# Patient Record
Sex: FEMALE | Race: WHITE | NOT HISPANIC OR LATINO
[De-identification: names, ages, dates, MRNs, and addresses within clinical notes are randomized per-mention and may not be internally consistent; named-entity substitution may affect disease eponyms.]

---

## 2017-03-12 ENCOUNTER — RX RENEWAL (OUTPATIENT)
Age: 72
End: 2017-03-12

## 2017-03-25 ENCOUNTER — RX RENEWAL (OUTPATIENT)
Age: 72
End: 2017-03-25

## 2017-03-28 ENCOUNTER — MEDICATION RENEWAL (OUTPATIENT)
Age: 72
End: 2017-03-28

## 2017-04-04 ENCOUNTER — NON-APPOINTMENT (OUTPATIENT)
Age: 72
End: 2017-04-04

## 2017-04-04 ENCOUNTER — APPOINTMENT (OUTPATIENT)
Dept: CARDIOLOGY | Facility: CLINIC | Age: 72
End: 2017-04-04

## 2017-04-04 VITALS
HEIGHT: 64 IN | SYSTOLIC BLOOD PRESSURE: 130 MMHG | DIASTOLIC BLOOD PRESSURE: 74 MMHG | OXYGEN SATURATION: 96 % | HEART RATE: 76 BPM

## 2017-04-05 ENCOUNTER — RESULT REVIEW (OUTPATIENT)
Age: 72
End: 2017-04-05

## 2017-04-05 LAB
ALBUMIN SERPL ELPH-MCNC: 4.7 G/DL
ALP BLD-CCNC: 91 U/L
ALT SERPL-CCNC: 28 U/L
ANION GAP SERPL CALC-SCNC: 22 MMOL/L
AST SERPL-CCNC: 23 U/L
BASOPHILS # BLD AUTO: 0.03 K/UL
BASOPHILS NFR BLD AUTO: 0.5 %
BILIRUB SERPL-MCNC: 0.3 MG/DL
BUN SERPL-MCNC: 19 MG/DL
CALCIUM SERPL-MCNC: 10.1 MG/DL
CHLORIDE SERPL-SCNC: 106 MMOL/L
CHOLEST SERPL-MCNC: 256 MG/DL
CHOLEST/HDLC SERPL: 5.2 RATIO
CO2 SERPL-SCNC: 20 MMOL/L
CREAT SERPL-MCNC: 0.99 MG/DL
EOSINOPHIL # BLD AUTO: 0.28 K/UL
EOSINOPHIL NFR BLD AUTO: 4.6 %
GLUCOSE SERPL-MCNC: 130 MG/DL
HBA1C MFR BLD HPLC: 6.6 %
HCT VFR BLD CALC: 41.1 %
HDLC SERPL-MCNC: 49 MG/DL
HGB BLD-MCNC: 13.3 G/DL
IMM GRANULOCYTES NFR BLD AUTO: 0.2 %
LDLC SERPL CALC-MCNC: 161 MG/DL
LYMPHOCYTES # BLD AUTO: 1.78 K/UL
LYMPHOCYTES NFR BLD AUTO: 28.9 %
MAN DIFF?: NORMAL
MCHC RBC-ENTMCNC: 30 PG
MCHC RBC-ENTMCNC: 32.4 GM/DL
MCV RBC AUTO: 92.8 FL
MONOCYTES # BLD AUTO: 0.28 K/UL
MONOCYTES NFR BLD AUTO: 4.6 %
NEUTROPHILS # BLD AUTO: 3.77 K/UL
NEUTROPHILS NFR BLD AUTO: 61.2 %
PLATELET # BLD AUTO: 307 K/UL
POTASSIUM SERPL-SCNC: 5.2 MMOL/L
PROT SERPL-MCNC: 7.4 G/DL
RBC # BLD: 4.43 M/UL
RBC # FLD: 13.2 %
SODIUM SERPL-SCNC: 148 MMOL/L
T4 SERPL-MCNC: 10.4 UG/DL
TRIGL SERPL-MCNC: 231 MG/DL
TSH SERPL-ACNC: 2.07 UIU/ML
WBC # FLD AUTO: 6.15 K/UL

## 2017-07-27 LAB
ALBUMIN SERPL ELPH-MCNC: 4.5 G/DL
ALP BLD-CCNC: 86 U/L
ALT SERPL-CCNC: 19 U/L
ANION GAP SERPL CALC-SCNC: 18 MMOL/L
AST SERPL-CCNC: 17 U/L
BASOPHILS # BLD AUTO: 0.04 K/UL
BASOPHILS NFR BLD AUTO: 0.6 %
BILIRUB SERPL-MCNC: 0.2 MG/DL
BUN SERPL-MCNC: 21 MG/DL
CALCIUM SERPL-MCNC: 9.7 MG/DL
CHLORIDE SERPL-SCNC: 107 MMOL/L
CHOLEST SERPL-MCNC: 226 MG/DL
CHOLEST/HDLC SERPL: 5 RATIO
CO2 SERPL-SCNC: 22 MMOL/L
CREAT SERPL-MCNC: 1.04 MG/DL
EOSINOPHIL # BLD AUTO: 0.28 K/UL
EOSINOPHIL NFR BLD AUTO: 4.4 %
GLUCOSE SERPL-MCNC: 127 MG/DL
HBA1C MFR BLD HPLC: 6.6 %
HCT VFR BLD CALC: 41.5 %
HDLC SERPL-MCNC: 45 MG/DL
HGB BLD-MCNC: 13.8 G/DL
IMM GRANULOCYTES NFR BLD AUTO: 0 %
LDLC SERPL CALC-MCNC: 141 MG/DL
LYMPHOCYTES # BLD AUTO: 2.58 K/UL
LYMPHOCYTES NFR BLD AUTO: 40.8 %
MAN DIFF?: NORMAL
MCHC RBC-ENTMCNC: 30.1 PG
MCHC RBC-ENTMCNC: 33.3 GM/DL
MCV RBC AUTO: 90.6 FL
MONOCYTES # BLD AUTO: 0.36 K/UL
MONOCYTES NFR BLD AUTO: 5.7 %
NEUTROPHILS # BLD AUTO: 3.06 K/UL
NEUTROPHILS NFR BLD AUTO: 48.5 %
PLATELET # BLD AUTO: 264 K/UL
POTASSIUM SERPL-SCNC: 4.8 MMOL/L
PROT SERPL-MCNC: 7.1 G/DL
RBC # BLD: 4.58 M/UL
RBC # FLD: 13.2 %
SODIUM SERPL-SCNC: 147 MMOL/L
T4 SERPL-MCNC: 8.4 UG/DL
TRIGL SERPL-MCNC: 198 MG/DL
TSH SERPL-ACNC: 4.18 UIU/ML
WBC # FLD AUTO: 6.32 K/UL

## 2017-08-02 ENCOUNTER — APPOINTMENT (OUTPATIENT)
Dept: CARDIOLOGY | Facility: CLINIC | Age: 72
End: 2017-08-02
Payer: MEDICARE

## 2017-08-02 ENCOUNTER — NON-APPOINTMENT (OUTPATIENT)
Age: 72
End: 2017-08-02

## 2017-08-02 VITALS
SYSTOLIC BLOOD PRESSURE: 147 MMHG | OXYGEN SATURATION: 98 % | DIASTOLIC BLOOD PRESSURE: 85 MMHG | HEART RATE: 74 BPM | HEIGHT: 64 IN

## 2017-08-02 PROCEDURE — 93000 ELECTROCARDIOGRAM COMPLETE: CPT

## 2017-08-02 PROCEDURE — 99214 OFFICE O/P EST MOD 30 MIN: CPT

## 2017-11-29 ENCOUNTER — LABORATORY RESULT (OUTPATIENT)
Age: 72
End: 2017-11-29

## 2017-11-30 LAB
25(OH)D3 SERPL-MCNC: 25.8 NG/ML
ALBUMIN SERPL ELPH-MCNC: 4.8 G/DL
ALP BLD-CCNC: 86 U/L
ALT SERPL-CCNC: 22 U/L
ANION GAP SERPL CALC-SCNC: 16 MMOL/L
AST SERPL-CCNC: 21 U/L
BASOPHILS # BLD AUTO: 0.05 K/UL
BASOPHILS NFR BLD AUTO: 0.7 %
BILIRUB SERPL-MCNC: 0.3 MG/DL
BUN SERPL-MCNC: 24 MG/DL
CALCIUM SERPL-MCNC: 10.2 MG/DL
CHLORIDE SERPL-SCNC: 103 MMOL/L
CHOLEST SERPL-MCNC: 264 MG/DL
CHOLEST/HDLC SERPL: 4.8 RATIO
CO2 SERPL-SCNC: 23 MMOL/L
CREAT SERPL-MCNC: 1.04 MG/DL
EOSINOPHIL # BLD AUTO: 0.2 K/UL
EOSINOPHIL NFR BLD AUTO: 2.8 %
GLUCOSE SERPL-MCNC: 134 MG/DL
HBA1C MFR BLD HPLC: 6.9 %
HCT VFR BLD CALC: 43.4 %
HDLC SERPL-MCNC: 55 MG/DL
HGB BLD-MCNC: 14.2 G/DL
IMM GRANULOCYTES NFR BLD AUTO: 0.1 %
LDLC SERPL CALC-MCNC: 180 MG/DL
LYMPHOCYTES # BLD AUTO: 2.04 K/UL
LYMPHOCYTES NFR BLD AUTO: 28.5 %
MAN DIFF?: NORMAL
MCHC RBC-ENTMCNC: 31.1 PG
MCHC RBC-ENTMCNC: 32.7 GM/DL
MCV RBC AUTO: 95.2 FL
MONOCYTES # BLD AUTO: 0.34 K/UL
MONOCYTES NFR BLD AUTO: 4.7 %
NEUTROPHILS # BLD AUTO: 4.52 K/UL
NEUTROPHILS NFR BLD AUTO: 63.2 %
PLATELET # BLD AUTO: 277 K/UL
POTASSIUM SERPL-SCNC: 5.1 MMOL/L
PROT SERPL-MCNC: 7.5 G/DL
RBC # BLD: 4.56 M/UL
RBC # FLD: 13.7 %
SODIUM SERPL-SCNC: 142 MMOL/L
T3RU NFR SERPL: 1.04 INDEX
TRIGL SERPL-MCNC: 145 MG/DL
WBC # FLD AUTO: 7.16 K/UL

## 2017-12-06 ENCOUNTER — NON-APPOINTMENT (OUTPATIENT)
Age: 72
End: 2017-12-06

## 2017-12-06 ENCOUNTER — APPOINTMENT (OUTPATIENT)
Dept: CARDIOLOGY | Facility: CLINIC | Age: 72
End: 2017-12-06
Payer: MEDICARE

## 2017-12-06 VITALS
SYSTOLIC BLOOD PRESSURE: 159 MMHG | HEART RATE: 78 BPM | DIASTOLIC BLOOD PRESSURE: 88 MMHG | OXYGEN SATURATION: 98 % | HEIGHT: 64 IN

## 2017-12-06 VITALS — DIASTOLIC BLOOD PRESSURE: 76 MMHG | SYSTOLIC BLOOD PRESSURE: 160 MMHG

## 2017-12-06 DIAGNOSIS — R73.09 OTHER ABNORMAL GLUCOSE: ICD-10-CM

## 2017-12-06 PROCEDURE — 99214 OFFICE O/P EST MOD 30 MIN: CPT

## 2017-12-06 PROCEDURE — 93000 ELECTROCARDIOGRAM COMPLETE: CPT

## 2017-12-22 ENCOUNTER — RX RENEWAL (OUTPATIENT)
Age: 72
End: 2017-12-22

## 2018-01-25 ENCOUNTER — RX RENEWAL (OUTPATIENT)
Age: 73
End: 2018-01-25

## 2018-04-04 ENCOUNTER — NON-APPOINTMENT (OUTPATIENT)
Age: 73
End: 2018-04-04

## 2018-04-04 ENCOUNTER — LABORATORY RESULT (OUTPATIENT)
Age: 73
End: 2018-04-04

## 2018-04-04 ENCOUNTER — APPOINTMENT (OUTPATIENT)
Dept: CARDIOLOGY | Facility: CLINIC | Age: 73
End: 2018-04-04
Payer: MEDICARE

## 2018-04-04 VITALS
OXYGEN SATURATION: 99 % | HEART RATE: 87 BPM | WEIGHT: 184 LBS | DIASTOLIC BLOOD PRESSURE: 75 MMHG | TEMPERATURE: 98.3 F | SYSTOLIC BLOOD PRESSURE: 137 MMHG | BODY MASS INDEX: 31.41 KG/M2 | HEIGHT: 64 IN

## 2018-04-04 PROCEDURE — 99214 OFFICE O/P EST MOD 30 MIN: CPT

## 2018-04-04 PROCEDURE — 93000 ELECTROCARDIOGRAM COMPLETE: CPT

## 2018-04-05 LAB
ALBUMIN SERPL ELPH-MCNC: 4.4 G/DL
ALP BLD-CCNC: 80 U/L
ALT SERPL-CCNC: 22 U/L
ANION GAP SERPL CALC-SCNC: 14 MMOL/L
AST SERPL-CCNC: 17 U/L
BASOPHILS # BLD AUTO: 0.04 K/UL
BASOPHILS NFR BLD AUTO: 0.6 %
BILIRUB SERPL-MCNC: 0.3 MG/DL
BUN SERPL-MCNC: 17 MG/DL
CALCIUM SERPL-MCNC: 9.8 MG/DL
CHLORIDE SERPL-SCNC: 103 MMOL/L
CHOLEST SERPL-MCNC: 223 MG/DL
CHOLEST/HDLC SERPL: 5.9 RATIO
CO2 SERPL-SCNC: 24 MMOL/L
CREAT SERPL-MCNC: 1.09 MG/DL
EOSINOPHIL # BLD AUTO: 0.29 K/UL
EOSINOPHIL NFR BLD AUTO: 4.4 %
GLUCOSE SERPL-MCNC: 142 MG/DL
HBA1C MFR BLD HPLC: 7.5 %
HCT VFR BLD CALC: 39.6 %
HDLC SERPL-MCNC: 38 MG/DL
HGB BLD-MCNC: 13.6 G/DL
IMM GRANULOCYTES NFR BLD AUTO: 0 %
LDLC SERPL CALC-MCNC: 131 MG/DL
LYMPHOCYTES # BLD AUTO: 2.59 K/UL
LYMPHOCYTES NFR BLD AUTO: 39 %
MAN DIFF?: NORMAL
MCHC RBC-ENTMCNC: 31.5 PG
MCHC RBC-ENTMCNC: 34.3 GM/DL
MCV RBC AUTO: 91.7 FL
MONOCYTES # BLD AUTO: 0.49 K/UL
MONOCYTES NFR BLD AUTO: 7.4 %
NEUTROPHILS # BLD AUTO: 3.22 K/UL
NEUTROPHILS NFR BLD AUTO: 48.4 %
PLATELET # BLD AUTO: 283 K/UL
POTASSIUM SERPL-SCNC: 4.5 MMOL/L
PROT SERPL-MCNC: 7 G/DL
RBC # BLD: 4.32 M/UL
RBC # FLD: 13 %
SODIUM SERPL-SCNC: 141 MMOL/L
T3 SERPL-MCNC: 102 NG/DL
T3RU NFR SERPL: 1.05 INDEX
T4 SERPL-MCNC: 9.7 UG/DL
TRIGL SERPL-MCNC: 269 MG/DL
TSH SERPL-ACNC: 4.47 UIU/ML
WBC # FLD AUTO: 6.64 K/UL

## 2018-05-31 ENCOUNTER — MEDICATION RENEWAL (OUTPATIENT)
Age: 73
End: 2018-05-31

## 2018-05-31 ENCOUNTER — APPOINTMENT (OUTPATIENT)
Dept: CARDIOLOGY | Facility: CLINIC | Age: 73
End: 2018-05-31

## 2018-05-31 RX ORDER — PANTOPRAZOLE 40 MG/1
40 TABLET, DELAYED RELEASE ORAL
Qty: 90 | Refills: 0 | Status: ACTIVE | COMMUNITY
Start: 2017-09-25

## 2018-06-13 ENCOUNTER — APPOINTMENT (OUTPATIENT)
Dept: CARDIOLOGY | Facility: CLINIC | Age: 73
End: 2018-06-13
Payer: MEDICARE

## 2018-06-13 VITALS
OXYGEN SATURATION: 97 % | SYSTOLIC BLOOD PRESSURE: 140 MMHG | HEIGHT: 64 IN | HEART RATE: 78 BPM | BODY MASS INDEX: 30.73 KG/M2 | DIASTOLIC BLOOD PRESSURE: 80 MMHG | WEIGHT: 180 LBS

## 2018-06-13 DIAGNOSIS — M48.062 SPINAL STENOSIS, LUMBAR REGION WITH NEUROGENIC CLAUDICATION: ICD-10-CM

## 2018-06-13 PROCEDURE — 99214 OFFICE O/P EST MOD 30 MIN: CPT

## 2018-06-24 ENCOUNTER — RX RENEWAL (OUTPATIENT)
Age: 73
End: 2018-06-24

## 2018-08-01 ENCOUNTER — NON-APPOINTMENT (OUTPATIENT)
Age: 73
End: 2018-08-01

## 2018-08-01 ENCOUNTER — APPOINTMENT (OUTPATIENT)
Dept: CARDIOLOGY | Facility: CLINIC | Age: 73
End: 2018-08-01
Payer: MEDICARE

## 2018-08-01 VITALS — SYSTOLIC BLOOD PRESSURE: 148 MMHG | DIASTOLIC BLOOD PRESSURE: 76 MMHG

## 2018-08-01 VITALS
WEIGHT: 178 LBS | OXYGEN SATURATION: 98 % | SYSTOLIC BLOOD PRESSURE: 157 MMHG | HEART RATE: 91 BPM | HEIGHT: 64 IN | BODY MASS INDEX: 30.39 KG/M2 | DIASTOLIC BLOOD PRESSURE: 74 MMHG

## 2018-08-01 DIAGNOSIS — M51.36 OTHER INTERVERTEBRAL DISC DEGENERATION, LUMBAR REGION: ICD-10-CM

## 2018-08-01 LAB
ALBUMIN SERPL ELPH-MCNC: 4.3 G/DL
ALP BLD-CCNC: 67 U/L
ALT SERPL-CCNC: 19 U/L
ANION GAP SERPL CALC-SCNC: 15 MMOL/L
AST SERPL-CCNC: 22 U/L
BASOPHILS # BLD AUTO: 0.02 K/UL
BASOPHILS NFR BLD AUTO: 0.4 %
BILIRUB SERPL-MCNC: 0.2 MG/DL
BUN SERPL-MCNC: 15 MG/DL
CALCIUM SERPL-MCNC: 9.3 MG/DL
CHLORIDE SERPL-SCNC: 105 MMOL/L
CHOLEST SERPL-MCNC: 215 MG/DL
CHOLEST/HDLC SERPL: 6.1 RATIO
CO2 SERPL-SCNC: 23 MMOL/L
CREAT SERPL-MCNC: 0.87 MG/DL
EOSINOPHIL # BLD AUTO: 0.2 K/UL
EOSINOPHIL NFR BLD AUTO: 4.1 %
ESTIMATED AVERAGE GLUCOSE: 148 MG/DL
GLUCOSE SERPL-MCNC: 144 MG/DL
HBA1C MFR BLD HPLC: 6.8 %
HCT VFR BLD CALC: 40.7 %
HDLC SERPL-MCNC: 35 MG/DL
HGB BLD-MCNC: 13.2 G/DL
IMM GRANULOCYTES NFR BLD AUTO: 0.2 %
LDLC SERPL CALC-MCNC: 150 MG/DL
LYMPHOCYTES # BLD AUTO: 1.7 K/UL
LYMPHOCYTES NFR BLD AUTO: 34.5 %
MAN DIFF?: NORMAL
MCHC RBC-ENTMCNC: 30.3 PG
MCHC RBC-ENTMCNC: 32.4 GM/DL
MCV RBC AUTO: 93.3 FL
MONOCYTES # BLD AUTO: 0.38 K/UL
MONOCYTES NFR BLD AUTO: 7.7 %
NEUTROPHILS # BLD AUTO: 2.62 K/UL
NEUTROPHILS NFR BLD AUTO: 53.1 %
PLATELET # BLD AUTO: 249 K/UL
POTASSIUM SERPL-SCNC: 4.3 MMOL/L
PROT SERPL-MCNC: 6.9 G/DL
RBC # BLD: 4.36 M/UL
RBC # FLD: 13.2 %
SODIUM SERPL-SCNC: 143 MMOL/L
T4 SERPL-MCNC: 8.9 UG/DL
TRIGL SERPL-MCNC: 150 MG/DL
TSH SERPL-ACNC: 2.8 UIU/ML
WBC # FLD AUTO: 4.93 K/UL

## 2018-08-01 PROCEDURE — 99214 OFFICE O/P EST MOD 30 MIN: CPT

## 2018-08-01 PROCEDURE — 93000 ELECTROCARDIOGRAM COMPLETE: CPT

## 2018-11-09 ENCOUNTER — MEDICATION RENEWAL (OUTPATIENT)
Age: 73
End: 2018-11-09

## 2018-11-22 LAB
ALBUMIN SERPL ELPH-MCNC: 4.9 G/DL
ALP BLD-CCNC: 83 U/L
ALT SERPL-CCNC: 29 U/L
ANION GAP SERPL CALC-SCNC: 12 MMOL/L
AST SERPL-CCNC: 21 U/L
BASOPHILS # BLD AUTO: 0.04 K/UL
BASOPHILS NFR BLD AUTO: 0.7 %
BILIRUB SERPL-MCNC: 0.4 MG/DL
BUN SERPL-MCNC: 14 MG/DL
CALCIUM SERPL-MCNC: 9.9 MG/DL
CHLORIDE SERPL-SCNC: 102 MMOL/L
CHOLEST SERPL-MCNC: 212 MG/DL
CHOLEST/HDLC SERPL: 5 RATIO
CO2 SERPL-SCNC: 26 MMOL/L
CREAT SERPL-MCNC: 1.03 MG/DL
EOSINOPHIL # BLD AUTO: 0.29 K/UL
EOSINOPHIL NFR BLD AUTO: 4.9 %
GLUCOSE SERPL-MCNC: 169 MG/DL
HBA1C MFR BLD HPLC: 7.2 %
HCT VFR BLD CALC: 43 %
HDLC SERPL-MCNC: 42 MG/DL
HGB BLD-MCNC: 13.9 G/DL
IMM GRANULOCYTES NFR BLD AUTO: 0.2 %
LDLC SERPL CALC-MCNC: 129 MG/DL
LYMPHOCYTES # BLD AUTO: 1.74 K/UL
LYMPHOCYTES NFR BLD AUTO: 29.4 %
MAN DIFF?: NORMAL
MCHC RBC-ENTMCNC: 30.1 PG
MCHC RBC-ENTMCNC: 32.3 GM/DL
MCV RBC AUTO: 93.1 FL
MONOCYTES # BLD AUTO: 0.43 K/UL
MONOCYTES NFR BLD AUTO: 7.3 %
NEUTROPHILS # BLD AUTO: 3.41 K/UL
NEUTROPHILS NFR BLD AUTO: 57.5 %
PLATELET # BLD AUTO: 301 K/UL
POTASSIUM SERPL-SCNC: 4.2 MMOL/L
PROT SERPL-MCNC: 7.3 G/DL
RBC # BLD: 4.62 M/UL
RBC # FLD: 13 %
SODIUM SERPL-SCNC: 140 MMOL/L
T3 SERPL-MCNC: 115 NG/DL
T4 FREE SERPL-MCNC: 1.6 NG/DL
T4 SERPL-MCNC: 10.3 UG/DL
TRIGL SERPL-MCNC: 206 MG/DL
TSH SERPL-ACNC: 2.66 UIU/ML
WBC # FLD AUTO: 5.92 K/UL

## 2018-11-26 ENCOUNTER — NON-APPOINTMENT (OUTPATIENT)
Age: 73
End: 2018-11-26

## 2018-11-26 ENCOUNTER — APPOINTMENT (OUTPATIENT)
Dept: CARDIOLOGY | Facility: CLINIC | Age: 73
End: 2018-11-26
Payer: MEDICARE

## 2018-11-26 VITALS
DIASTOLIC BLOOD PRESSURE: 62 MMHG | BODY MASS INDEX: 30.22 KG/M2 | SYSTOLIC BLOOD PRESSURE: 143 MMHG | HEART RATE: 82 BPM | HEIGHT: 64 IN | OXYGEN SATURATION: 99 % | WEIGHT: 177 LBS

## 2018-11-26 PROCEDURE — 99214 OFFICE O/P EST MOD 30 MIN: CPT

## 2018-11-26 PROCEDURE — 93000 ELECTROCARDIOGRAM COMPLETE: CPT

## 2018-11-26 RX ORDER — ACETAMINOPHEN AND CODEINE 300; 30 MG/1; MG/1
300-30 TABLET ORAL
Qty: 8 | Refills: 0 | Status: DISCONTINUED | COMMUNITY
Start: 2018-05-15 | End: 2018-11-26

## 2018-11-26 NOTE — REASON FOR VISIT
[FreeTextEntry1] : The patient comes in for followup of her hypertension, hyperlipidemia, hypothyroidism, and prediabetes. She denies any chest pains, shortness of breath, or palpitations. She is complaining of right-sided lower back pain and some right sided flank pain that seems to wrap around from the back. It is relieved by Advil which she takes sparingly.She went to a neurologist who did an MRI and found out that most of her pains are from sciatica. She has some pains in the foot which may be the sciatica or maybe because she had surgery done on that foot.

## 2018-11-26 NOTE — DISCUSSION/SUMMARY
[FreeTextEntry1] : The patient was examined. Her blood pressure was 143/62, and her pulse was 82..Her lungs were clear to auscultation. Cardiac exam was negative for murmurs rubs or gallops.  There was no cyanosis,clubbing or edema.  Her EKG showed normal sinus rhythm, poor R-wave progression, and nonspecific ST and T wave changes. No acute changes were seen.She was still continue to take Advil as needed for the pain. She will continue her other medication. She will return in 4 months, or earlier if needed.

## 2018-11-26 NOTE — PHYSICAL EXAM
[General Appearance - Well Developed] : well developed [Normal Appearance] : normal appearance [Well Groomed] : well groomed [General Appearance - Well Nourished] : well nourished [No Deformities] : no deformities [General Appearance - In No Acute Distress] : no acute distress [Normal Conjunctiva] : the conjunctiva exhibited no abnormalities [Eyelids - No Xanthelasma] : the eyelids demonstrated no xanthelasmas [Normal Oral Mucosa] : normal oral mucosa [No Oral Pallor] : no oral pallor [No Oral Cyanosis] : no oral cyanosis [Normal Jugular Venous A Waves Present] : normal jugular venous A waves present [Normal Jugular Venous V Waves Present] : normal jugular venous V waves present [No Jugular Venous Cabrera A Waves] : no jugular venous cabrera A waves [Respiration, Rhythm And Depth] : normal respiratory rhythm and effort [Exaggerated Use Of Accessory Muscles For Inspiration] : no accessory muscle use [Auscultation Breath Sounds / Voice Sounds] : lungs were clear to auscultation bilaterally [Heart Rate And Rhythm] : heart rate and rhythm were normal [Heart Sounds] : normal S1 and S2 [Murmurs] : no murmurs present [Abdomen Soft] : soft [Abdomen Tenderness] : non-tender [Abdomen Mass (___ Cm)] : no abdominal mass palpated [Abnormal Walk] : normal gait [Gait - Sufficient For Exercise Testing] : the gait was sufficient for exercise testing [Nail Clubbing] : no clubbing of the fingernails [Cyanosis, Localized] : no localized cyanosis [Petechial Hemorrhages (___cm)] : no petechial hemorrhages [Skin Color & Pigmentation] : normal skin color and pigmentation [] : no rash [No Venous Stasis] : no venous stasis [Skin Lesions] : no skin lesions [No Skin Ulcers] : no skin ulcer [No Xanthoma] : no  xanthoma was observed [Oriented To Time, Place, And Person] : oriented to person, place, and time [Affect] : the affect was normal [Mood] : the mood was normal [No Anxiety] : not feeling anxious

## 2018-11-26 NOTE — REVIEW OF SYSTEMS
[see HPI] : see HPI [Negative] : Heme/Lymph [Feeling Fatigued] : not feeling fatigued [Blurry Vision] : no blurred vision [Shortness Of Breath] : no shortness of breath [Chest Pain] : no chest pain [Palpitations] : no palpitations [Cough] : no cough

## 2019-01-01 ENCOUNTER — APPOINTMENT (OUTPATIENT)
Dept: CARDIOLOGY | Facility: CLINIC | Age: 74
End: 2019-01-01
Payer: MEDICARE

## 2019-01-01 ENCOUNTER — OTHER (OUTPATIENT)
Age: 74
End: 2019-01-01

## 2019-01-01 ENCOUNTER — NON-APPOINTMENT (OUTPATIENT)
Age: 74
End: 2019-01-01

## 2019-01-01 ENCOUNTER — RX RENEWAL (OUTPATIENT)
Age: 74
End: 2019-01-01

## 2019-01-01 VITALS
OXYGEN SATURATION: 99 % | HEART RATE: 72 BPM | DIASTOLIC BLOOD PRESSURE: 84 MMHG | SYSTOLIC BLOOD PRESSURE: 132 MMHG | HEIGHT: 64 IN

## 2019-01-01 VITALS
SYSTOLIC BLOOD PRESSURE: 150 MMHG | WEIGHT: 181 LBS | HEART RATE: 76 BPM | DIASTOLIC BLOOD PRESSURE: 80 MMHG | BODY MASS INDEX: 30.9 KG/M2 | OXYGEN SATURATION: 97 % | HEIGHT: 64 IN

## 2019-01-01 DIAGNOSIS — M54.5 LOW BACK PAIN: ICD-10-CM

## 2019-01-01 DIAGNOSIS — R21 RASH AND OTHER NONSPECIFIC SKIN ERUPTION: ICD-10-CM

## 2019-01-01 DIAGNOSIS — Z86.69 PERSONAL HISTORY OF OTHER DISEASES OF THE NERVOUS SYSTEM AND SENSE ORGANS: ICD-10-CM

## 2019-01-01 LAB
ALBUMIN SERPL ELPH-MCNC: 4.6 G/DL
ALBUMIN SERPL ELPH-MCNC: 4.6 G/DL
ALP BLD-CCNC: 80 U/L
ALP BLD-CCNC: 95 U/L
ALT SERPL-CCNC: 20 U/L
ALT SERPL-CCNC: 24 U/L
ANION GAP SERPL CALC-SCNC: 12 MMOL/L
ANION GAP SERPL CALC-SCNC: 13 MMOL/L
AST SERPL-CCNC: 13 U/L
AST SERPL-CCNC: 17 U/L
BASOPHILS # BLD AUTO: 0.04 K/UL
BASOPHILS # BLD AUTO: 0.06 K/UL
BASOPHILS NFR BLD AUTO: 0.8 %
BASOPHILS NFR BLD AUTO: 1.1 %
BILIRUB SERPL-MCNC: 0.2 MG/DL
BILIRUB SERPL-MCNC: 0.3 MG/DL
BUN SERPL-MCNC: 15 MG/DL
BUN SERPL-MCNC: 16 MG/DL
CALCIUM SERPL-MCNC: 9.6 MG/DL
CALCIUM SERPL-MCNC: 9.9 MG/DL
CHLORIDE SERPL-SCNC: 102 MMOL/L
CHLORIDE SERPL-SCNC: 105 MMOL/L
CHOLEST SERPL-MCNC: 225 MG/DL
CHOLEST SERPL-MCNC: 305 MG/DL
CHOLEST/HDLC SERPL: 5.8 RATIO
CHOLEST/HDLC SERPL: 8.2 RATIO
CO2 SERPL-SCNC: 25 MMOL/L
CO2 SERPL-SCNC: 27 MMOL/L
CREAT SERPL-MCNC: 0.86 MG/DL
CREAT SERPL-MCNC: 0.89 MG/DL
EOSINOPHIL # BLD AUTO: 0.21 K/UL
EOSINOPHIL # BLD AUTO: 0.25 K/UL
EOSINOPHIL NFR BLD AUTO: 4 %
EOSINOPHIL NFR BLD AUTO: 4.4 %
ESTIMATED AVERAGE GLUCOSE: 163 MG/DL
ESTIMATED AVERAGE GLUCOSE: 214 MG/DL
GLUCOSE SERPL-MCNC: 152 MG/DL
GLUCOSE SERPL-MCNC: 219 MG/DL
HBA1C MFR BLD HPLC: 7.3 %
HBA1C MFR BLD HPLC: 9.1 %
HCT VFR BLD CALC: 42 %
HCT VFR BLD CALC: 42.1 %
HDLC SERPL-MCNC: 37 MG/DL
HDLC SERPL-MCNC: 39 MG/DL
HGB BLD-MCNC: 13.4 G/DL
HGB BLD-MCNC: 13.6 G/DL
IMM GRANULOCYTES NFR BLD AUTO: 0.2 %
IMM GRANULOCYTES NFR BLD AUTO: 0.2 %
LDLC SERPL CALC-MCNC: 141 MG/DL
LDLC SERPL CALC-MCNC: 213 MG/DL
LYMPHOCYTES # BLD AUTO: 1.62 K/UL
LYMPHOCYTES # BLD AUTO: 1.65 K/UL
LYMPHOCYTES NFR BLD AUTO: 28.7 %
LYMPHOCYTES NFR BLD AUTO: 31.6 %
MAN DIFF?: NORMAL
MAN DIFF?: NORMAL
MCHC RBC-ENTMCNC: 30 PG
MCHC RBC-ENTMCNC: 30.2 PG
MCHC RBC-ENTMCNC: 31.8 GM/DL
MCHC RBC-ENTMCNC: 32.4 GM/DL
MCV RBC AUTO: 93.1 FL
MCV RBC AUTO: 94.2 FL
MONOCYTES # BLD AUTO: 0.31 K/UL
MONOCYTES # BLD AUTO: 0.43 K/UL
MONOCYTES NFR BLD AUTO: 5.9 %
MONOCYTES NFR BLD AUTO: 7.6 %
NEUTROPHILS # BLD AUTO: 3 K/UL
NEUTROPHILS # BLD AUTO: 3.27 K/UL
NEUTROPHILS NFR BLD AUTO: 57.5 %
NEUTROPHILS NFR BLD AUTO: 58 %
PLATELET # BLD AUTO: 251 K/UL
PLATELET # BLD AUTO: 254 K/UL
POTASSIUM SERPL-SCNC: 4.6 MMOL/L
POTASSIUM SERPL-SCNC: 5.1 MMOL/L
PROT SERPL-MCNC: 7 G/DL
PROT SERPL-MCNC: 7.1 G/DL
RBC # BLD: 4.47 M/UL
RBC # BLD: 4.51 M/UL
RBC # FLD: 12.7 %
RBC # FLD: 12.9 %
SODIUM SERPL-SCNC: 141 MMOL/L
SODIUM SERPL-SCNC: 143 MMOL/L
T4 SERPL-MCNC: 11.3 UG/DL
T4 SERPL-MCNC: 9.8 UG/DL
TRIGL SERPL-MCNC: 227 MG/DL
TRIGL SERPL-MCNC: 273 MG/DL
TSH SERPL-ACNC: 2.34 UIU/ML
TSH SERPL-ACNC: 3.97 UIU/ML
WBC # FLD AUTO: 5.22 K/UL
WBC # FLD AUTO: 5.64 K/UL

## 2019-01-01 PROCEDURE — 93000 ELECTROCARDIOGRAM COMPLETE: CPT

## 2019-01-01 PROCEDURE — 99214 OFFICE O/P EST MOD 30 MIN: CPT

## 2019-01-01 RX ORDER — AMOXICILLIN 250 MG/1
250 CAPSULE ORAL
Qty: 21 | Refills: 0 | Status: DISCONTINUED | COMMUNITY
Start: 2018-05-15 | End: 2019-01-01

## 2019-01-01 RX ORDER — METFORMIN HYDROCHLORIDE 500 MG/1
500 TABLET, COATED ORAL
Qty: 180 | Refills: 3 | Status: ACTIVE | COMMUNITY
Start: 2019-01-01 | End: 1900-01-01

## 2019-01-16 LAB — ABO + RH PNL BLD: NORMAL

## 2019-01-25 ENCOUNTER — RX RENEWAL (OUTPATIENT)
Age: 74
End: 2019-01-25

## 2019-02-13 ENCOUNTER — RX RENEWAL (OUTPATIENT)
Age: 74
End: 2019-02-13

## 2019-03-13 ENCOUNTER — OTHER (OUTPATIENT)
Age: 74
End: 2019-03-13

## 2019-03-22 LAB
ALBUMIN SERPL ELPH-MCNC: 4.8 G/DL
ALP BLD-CCNC: 89 U/L
ALT SERPL-CCNC: 24 U/L
ANION GAP SERPL CALC-SCNC: 14 MMOL/L
AST SERPL-CCNC: 17 U/L
BASOPHILS # BLD AUTO: 0.05 K/UL
BASOPHILS NFR BLD AUTO: 0.8 %
BILIRUB SERPL-MCNC: 0.3 MG/DL
BUN SERPL-MCNC: 15 MG/DL
CALCIUM SERPL-MCNC: 10 MG/DL
CHLORIDE SERPL-SCNC: 101 MMOL/L
CHOLEST SERPL-MCNC: 298 MG/DL
CHOLEST/HDLC SERPL: 7.8 RATIO
CO2 SERPL-SCNC: 27 MMOL/L
CREAT SERPL-MCNC: 0.95 MG/DL
EOSINOPHIL # BLD AUTO: 0.25 K/UL
EOSINOPHIL NFR BLD AUTO: 4.2 %
ESTIMATED AVERAGE GLUCOSE: 171 MG/DL
GLUCOSE SERPL-MCNC: 159 MG/DL
HBA1C MFR BLD HPLC: 7.6 %
HCT VFR BLD CALC: 43.7 %
HDLC SERPL-MCNC: 38 MG/DL
HGB BLD-MCNC: 14.2 G/DL
IMM GRANULOCYTES NFR BLD AUTO: 0.3 %
LDLC SERPL CALC-MCNC: 196 MG/DL
LYMPHOCYTES # BLD AUTO: 1.44 K/UL
LYMPHOCYTES NFR BLD AUTO: 24 %
MAN DIFF?: NORMAL
MCHC RBC-ENTMCNC: 30.7 PG
MCHC RBC-ENTMCNC: 32.5 GM/DL
MCV RBC AUTO: 94.6 FL
MONOCYTES # BLD AUTO: 0.39 K/UL
MONOCYTES NFR BLD AUTO: 6.5 %
NEUTROPHILS # BLD AUTO: 3.84 K/UL
NEUTROPHILS NFR BLD AUTO: 64.2 %
PLATELET # BLD AUTO: 298 K/UL
POTASSIUM SERPL-SCNC: 4.5 MMOL/L
PROT SERPL-MCNC: 7.4 G/DL
RBC # BLD: 4.62 M/UL
RBC # FLD: 13 %
SODIUM SERPL-SCNC: 142 MMOL/L
T4 SERPL-MCNC: 10.9 UG/DL
TRIGL SERPL-MCNC: 319 MG/DL
TSH SERPL-ACNC: 3.65 UIU/ML
WBC # FLD AUTO: 5.99 K/UL

## 2019-03-26 ENCOUNTER — NON-APPOINTMENT (OUTPATIENT)
Age: 74
End: 2019-03-26

## 2019-03-26 ENCOUNTER — APPOINTMENT (OUTPATIENT)
Dept: CARDIOLOGY | Facility: CLINIC | Age: 74
End: 2019-03-26
Payer: MEDICARE

## 2019-03-26 VITALS
HEIGHT: 64 IN | SYSTOLIC BLOOD PRESSURE: 155 MMHG | OXYGEN SATURATION: 97 % | TEMPERATURE: 97.8 F | HEART RATE: 77 BPM | BODY MASS INDEX: 30.39 KG/M2 | DIASTOLIC BLOOD PRESSURE: 79 MMHG | WEIGHT: 178 LBS

## 2019-03-26 PROCEDURE — 99214 OFFICE O/P EST MOD 30 MIN: CPT

## 2019-03-26 PROCEDURE — 93000 ELECTROCARDIOGRAM COMPLETE: CPT

## 2019-03-26 NOTE — DISCUSSION/SUMMARY
[FreeTextEntry1] : The patient was examined. Her blood pressure was 155/79, and her pulse was 77..Her lungs were clear to auscultation. Cardiac exam was negative for murmurs rubs or gallops.  There was no cyanosis,clubbing or edema.  Her EKG showed normal sinus rhythm, and Low voltage in the limb leads.. No acute changes were seen. She will continue her  medication. She will return in 4 months, or earlier if needed.

## 2019-03-26 NOTE — REASON FOR VISIT
[FreeTextEntry1] : The patient comes in for followup of her hypertension, hyperlipidemia, hypothyroidism, and prediabetes. She denies any chest pains, shortness of breath, or palpitations. She is complaining of right-sided lower back pain and some right sided flank pain that seems to wrap around from the back. It is relieved by Advil which she takes sparingly.She went to a neurologist who did an MRI and found out that most of her pains are from sciatica. She has some pains in the foot which may be the sciatica or maybe because she had surgery done on that foot.\par March 26, 2019: The patient is exposed to strep with her grandchildren and she is on 10 so and 5 mg twice a day. She went to a dermatologist because of a redness of her lower extremities. He has tried cortisone cream. She denies any chest pains, shortness breath, or palpitations.\par She did have some pretzels which had sulfa.

## 2019-07-22 NOTE — DISCUSSION/SUMMARY
[FreeTextEntry1] : The patient was examined. Her blood pressure was 132/84, and her pulse was 72..Her lungs were clear to auscultation. Cardiac exam was negative for murmurs rubs or gallops.  There was no cyanosis,clubbing or edema.  Her EKG showed normal sinus rhythm, and Low voltage in the limb leads.. No acute changes were seen. She will continue her  medication. She will return in 4 months, or earlier if needed.Because her lipids are so high, we will try her again on generic . Crestor but this time only the 5 mg, and we will give her coenzyme Q10 with it.

## 2019-07-22 NOTE — PHYSICAL EXAM
[General Appearance - Well Developed] : well developed [Normal Appearance] : normal appearance [Well Groomed] : well groomed [General Appearance - Well Nourished] : well nourished [No Deformities] : no deformities [General Appearance - In No Acute Distress] : no acute distress [Normal Conjunctiva] : the conjunctiva exhibited no abnormalities [Eyelids - No Xanthelasma] : the eyelids demonstrated no xanthelasmas [Normal Oral Mucosa] : normal oral mucosa [No Oral Pallor] : no oral pallor [No Oral Cyanosis] : no oral cyanosis [Normal Jugular Venous A Waves Present] : normal jugular venous A waves present [Normal Jugular Venous V Waves Present] : normal jugular venous V waves present [No Jugular Venous Cabrera A Waves] : no jugular venous cabrera A waves [Respiration, Rhythm And Depth] : normal respiratory rhythm and effort [Exaggerated Use Of Accessory Muscles For Inspiration] : no accessory muscle use [Auscultation Breath Sounds / Voice Sounds] : lungs were clear to auscultation bilaterally [Heart Rate And Rhythm] : heart rate and rhythm were normal [Heart Sounds] : normal S1 and S2 [Murmurs] : no murmurs present [Abdomen Soft] : soft [Abdomen Tenderness] : non-tender [Abdomen Mass (___ Cm)] : no abdominal mass palpated [Abnormal Walk] : normal gait [Gait - Sufficient For Exercise Testing] : the gait was sufficient for exercise testing [Nail Clubbing] : no clubbing of the fingernails [Cyanosis, Localized] : no localized cyanosis [Petechial Hemorrhages (___cm)] : no petechial hemorrhages [Skin Color & Pigmentation] : normal skin color and pigmentation [No Venous Stasis] : no venous stasis [] : no rash [No Skin Ulcers] : no skin ulcer [Skin Lesions] : no skin lesions [No Xanthoma] : no  xanthoma was observed [Affect] : the affect was normal [Oriented To Time, Place, And Person] : oriented to person, place, and time [Mood] : the mood was normal [No Anxiety] : not feeling anxious

## 2019-07-22 NOTE — REASON FOR VISIT
[FreeTextEntry1] : The patient comes in for followup of her hypertension, hyperlipidemia, hypothyroidism, and prediabetes. She denies any chest pains, shortness of breath, or palpitations. She is complaining of right-sided lower back pain and some right sided flank pain that seems to wrap around from the back. It is relieved by Advil which she takes sparingly.She went to a neurologist who did an MRI and found out that most of her pains are from sciatica. She has some pains in the foot which may be the sciatica or maybe because she had surgery done on that foot.\par March 26, 2019: The patient is exposed to strep with her grandchildren and she is on 10 so and 5 mg twice a day. She went to a dermatologist because of a redness of her lower extremities. He has tried cortisone cream. She denies any chest pains, shortness breath, or palpitations.\par She did have some pretzels which had salt.\par July 22, 2019: The patient has no new complaints. She denies any chest pains, shortness of breath, or palpitations. She is not taking a statin because it caused some leg cramps and pains. She is taking coenzyme Q 10. On her last blood test her total cholesterol was 305. Her triglycerides were also quite high. She had a complaint of a petechial-type of rash on her lower extremities. She did show to her dermatologist was not concerned..\par

## 2019-11-12 PROBLEM — R21 RASH: Status: ACTIVE | Noted: 2019-01-01

## 2019-11-12 NOTE — PHYSICAL EXAM
[General Appearance - Well Developed] : well developed [Normal Appearance] : normal appearance [Well Groomed] : well groomed [General Appearance - Well Nourished] : well nourished [No Deformities] : no deformities [General Appearance - In No Acute Distress] : no acute distress [Normal Conjunctiva] : the conjunctiva exhibited no abnormalities [Normal Oral Mucosa] : normal oral mucosa [Eyelids - No Xanthelasma] : the eyelids demonstrated no xanthelasmas [No Oral Cyanosis] : no oral cyanosis [No Oral Pallor] : no oral pallor [Normal Jugular Venous A Waves Present] : normal jugular venous A waves present [Normal Jugular Venous V Waves Present] : normal jugular venous V waves present [Respiration, Rhythm And Depth] : normal respiratory rhythm and effort [No Jugular Venous Cabrera A Waves] : no jugular venous cabrera A waves [Exaggerated Use Of Accessory Muscles For Inspiration] : no accessory muscle use [Heart Rate And Rhythm] : heart rate and rhythm were normal [Auscultation Breath Sounds / Voice Sounds] : lungs were clear to auscultation bilaterally [Heart Sounds] : normal S1 and S2 [Abdomen Soft] : soft [Murmurs] : no murmurs present [Abdomen Tenderness] : non-tender [Abdomen Mass (___ Cm)] : no abdominal mass palpated [Abnormal Walk] : normal gait [Gait - Sufficient For Exercise Testing] : the gait was sufficient for exercise testing [Petechial Hemorrhages (___cm)] : no petechial hemorrhages [Cyanosis, Localized] : no localized cyanosis [Nail Clubbing] : no clubbing of the fingernails [Skin Color & Pigmentation] : normal skin color and pigmentation [] : no ischemic changes [No Venous Stasis] : no venous stasis [Skin Lesions] : no skin lesions [No Xanthoma] : no  xanthoma was observed [No Skin Ulcers] : no skin ulcer [Affect] : the affect was normal [Mood] : the mood was normal [Oriented To Time, Place, And Person] : oriented to person, place, and time [No Anxiety] : not feeling anxious [FreeTextEntry1] : Red raised rash on both lower extremities

## 2019-11-12 NOTE — REVIEW OF SYSTEMS
[Negative] : Heme/Lymph [Skin: A Rash] : rash: [see HPI] : see HPI [Feeling Fatigued] : not feeling fatigued [Shortness Of Breath] : no shortness of breath [Blurry Vision] : no blurred vision [Chest Pain] : no chest pain [Palpitations] : no palpitations [Cough] : no cough

## 2019-11-12 NOTE — REASON FOR VISIT
[FreeTextEntry1] : The patient comes in for followup of her hypertension, hyperlipidemia, hypothyroidism, and prediabetes. She denies any chest pains, shortness of breath, or palpitations. She is complaining of right-sided lower back pain and some right sided flank pain that seems to wrap around from the back. It is relieved by Advil which she takes sparingly.She went to a neurologist who did an MRI and found out that most of her pains are from sciatica. She has some pains in the foot which may be the sciatica or maybe because she had surgery done on that foot.\par March 26, 2019: The patient is exposed to strep with her grandchildren and she is on 10 so and 5 mg twice a day. She went to a dermatologist because of a redness of her lower extremities. He has tried cortisone cream. She denies any chest pains, shortness breath, or palpitations.\par She did have some pretzels which had salt.\par  November 12, 2019: The patient's diabetes is out of control. We will start her on metformin 500 mg twice a day. We'll send her to an endocrinologist. She still has a rash. He was told to go back to the dermatologist. She denies any chest pains, shortness of breath, or palpitations.\par July 22, 2019: The patient has no new complaints. She denies any chest pains, shortness of breath, or palpitations. She is not taking a statin because it caused some leg cramps and pains. She is taking coenzyme Q 10. On her last blood test her total cholesterol was 305. Her triglycerides were also quite high. She had a complaint of a petechial-type of rash on her lower extremities. She did show to her dermatologist was not concerned..\par

## 2019-11-12 NOTE — DISCUSSION/SUMMARY
[FreeTextEntry1] : The patient was examined. Her blood pressure was 150/80, and her pulse was 76..Her lungs were clear to auscultation. Cardiac exam was negative for murmurs rubs or gallops.  There was no cyanosis,clubbing  , But there was trace edema both lower extremities with a red raised rash.Her EKG showed normal sinus rhythm, and Low voltage in the limb leads.. No acute changes were seen. She will continue her  medication. She will return in 4 months, or earlier if needed.Because her Diabetes not well controlled we will start her on metformin 500 mg twice a day and referred her to an endocrinologist. She was told to go back to her dermatologist about the rash. She will continue her on the medication.

## 2020-01-01 ENCOUNTER — LABORATORY RESULT (OUTPATIENT)
Age: 75
End: 2020-01-01

## 2020-01-01 ENCOUNTER — APPOINTMENT (OUTPATIENT)
Dept: CARDIOLOGY | Facility: CLINIC | Age: 75
End: 2020-01-01
Payer: MEDICARE

## 2020-01-01 ENCOUNTER — RX RENEWAL (OUTPATIENT)
Age: 75
End: 2020-01-01

## 2020-01-01 ENCOUNTER — INPATIENT (INPATIENT)
Facility: HOSPITAL | Age: 75
LOS: 8 days | DRG: 870 | End: 2020-04-05
Attending: TRANSPLANT SURGERY | Admitting: HOSPITALIST
Payer: MEDICARE

## 2020-01-01 ENCOUNTER — NON-APPOINTMENT (OUTPATIENT)
Age: 75
End: 2020-01-01

## 2020-01-01 VITALS
HEIGHT: 64 IN | DIASTOLIC BLOOD PRESSURE: 84 MMHG | WEIGHT: 173 LBS | SYSTOLIC BLOOD PRESSURE: 147 MMHG | OXYGEN SATURATION: 95 % | HEART RATE: 71 BPM | BODY MASS INDEX: 29.53 KG/M2

## 2020-01-01 VITALS
SYSTOLIC BLOOD PRESSURE: 123 MMHG | RESPIRATION RATE: 20 BRPM | HEART RATE: 79 BPM | OXYGEN SATURATION: 100 % | TEMPERATURE: 99 F | DIASTOLIC BLOOD PRESSURE: 70 MMHG

## 2020-01-01 DIAGNOSIS — E87.2 ACIDOSIS: ICD-10-CM

## 2020-01-01 DIAGNOSIS — N17.0 ACUTE KIDNEY FAILURE WITH TUBULAR NECROSIS: ICD-10-CM

## 2020-01-01 DIAGNOSIS — E11.9 TYPE 2 DIABETES MELLITUS W/OUT COMPLICATIONS: ICD-10-CM

## 2020-01-01 DIAGNOSIS — R74.0 NONSPECIFIC ELEVATION OF LEVELS OF TRANSAMINASE AND LACTIC ACID DEHYDROGENASE [LDH]: ICD-10-CM

## 2020-01-01 DIAGNOSIS — E03.9 HYPOTHYROIDISM, UNSPECIFIED: ICD-10-CM

## 2020-01-01 DIAGNOSIS — I10 ESSENTIAL (PRIMARY) HYPERTENSION: ICD-10-CM

## 2020-01-01 DIAGNOSIS — U07.1 COVID-19: ICD-10-CM

## 2020-01-01 DIAGNOSIS — E78.5 HYPERLIPIDEMIA, UNSPECIFIED: ICD-10-CM

## 2020-01-01 DIAGNOSIS — E87.5 HYPERKALEMIA: ICD-10-CM

## 2020-01-01 DIAGNOSIS — Z98.890 OTHER SPECIFIED POSTPROCEDURAL STATES: Chronic | ICD-10-CM

## 2020-01-01 DIAGNOSIS — E87.70 FLUID OVERLOAD, UNSPECIFIED: ICD-10-CM

## 2020-01-01 DIAGNOSIS — E11.9 TYPE 2 DIABETES MELLITUS WITHOUT COMPLICATIONS: ICD-10-CM

## 2020-01-01 DIAGNOSIS — A41.89 OTHER SPECIFIED SEPSIS: ICD-10-CM

## 2020-01-01 DIAGNOSIS — B34.2 CORONAVIRUS INFECTION, UNSPECIFIED: ICD-10-CM

## 2020-01-01 DIAGNOSIS — J12.9 VIRAL PNEUMONIA, UNSPECIFIED: ICD-10-CM

## 2020-01-01 DIAGNOSIS — Z66 DO NOT RESUSCITATE: ICD-10-CM

## 2020-01-01 DIAGNOSIS — Z00.00 ENCOUNTER FOR GENERAL ADULT MEDICAL EXAMINATION W/OUT ABNORMAL FINDINGS: ICD-10-CM

## 2020-01-01 DIAGNOSIS — Z98.49 CATARACT EXTRACTION STATUS, UNSPECIFIED EYE: Chronic | ICD-10-CM

## 2020-01-01 DIAGNOSIS — R65.21 SEVERE SEPSIS WITH SEPTIC SHOCK: ICD-10-CM

## 2020-01-01 DIAGNOSIS — N17.9 ACUTE KIDNEY FAILURE, UNSPECIFIED: ICD-10-CM

## 2020-01-01 DIAGNOSIS — Z02.9 ENCOUNTER FOR ADMINISTRATIVE EXAMINATIONS, UNSPECIFIED: ICD-10-CM

## 2020-01-01 DIAGNOSIS — J12.89 OTHER VIRAL PNEUMONIA: ICD-10-CM

## 2020-01-01 DIAGNOSIS — J80 ACUTE RESPIRATORY DISTRESS SYNDROME: ICD-10-CM

## 2020-01-01 DIAGNOSIS — J96.01 ACUTE RESPIRATORY FAILURE WITH HYPOXIA: ICD-10-CM

## 2020-01-01 DIAGNOSIS — R00.1 BRADYCARDIA, UNSPECIFIED: ICD-10-CM

## 2020-01-01 DIAGNOSIS — R09.02 HYPOXEMIA: ICD-10-CM

## 2020-01-01 DIAGNOSIS — Z79.84 LONG TERM (CURRENT) USE OF ORAL HYPOGLYCEMIC DRUGS: ICD-10-CM

## 2020-01-01 DIAGNOSIS — R57.8 OTHER SHOCK: ICD-10-CM

## 2020-01-01 DIAGNOSIS — Z51.5 ENCOUNTER FOR PALLIATIVE CARE: ICD-10-CM

## 2020-01-01 DIAGNOSIS — Z71.89 OTHER SPECIFIED COUNSELING: ICD-10-CM

## 2020-01-01 LAB
4/8 RATIO: 2.08 RATIO — SIGNIFICANT CHANGE UP (ref 0.86–4.14)
4/8 RATIO: 2.89 RATIO — SIGNIFICANT CHANGE UP (ref 0.86–4.14)
4/8 RATIO: 3.01 RATIO — SIGNIFICANT CHANGE UP (ref 0.86–4.14)
4/8 RATIO: 3.12 RATIO — SIGNIFICANT CHANGE UP (ref 0.86–4.14)
ABS CD8: 108 /UL — SIGNIFICANT CHANGE UP (ref 90–775)
ABS CD8: 115 /UL — SIGNIFICANT CHANGE UP (ref 90–775)
ABS CD8: 56 /UL — LOW (ref 90–775)
ABS CD8: 95 /UL — SIGNIFICANT CHANGE UP (ref 90–775)
ALBUMIN SERPL ELPH-MCNC: 1.9 G/DL — LOW (ref 3.3–5)
ALBUMIN SERPL ELPH-MCNC: 2 G/DL — LOW (ref 3.3–5)
ALBUMIN SERPL ELPH-MCNC: 2 G/DL — LOW (ref 3.3–5)
ALBUMIN SERPL ELPH-MCNC: 2.1 G/DL — LOW (ref 3.3–5)
ALBUMIN SERPL ELPH-MCNC: 2.2 G/DL — LOW (ref 3.3–5)
ALBUMIN SERPL ELPH-MCNC: 2.3 G/DL — LOW (ref 3.3–5)
ALBUMIN SERPL ELPH-MCNC: 2.4 G/DL — LOW (ref 3.3–5)
ALBUMIN SERPL ELPH-MCNC: 2.6 G/DL — LOW (ref 3.3–5)
ALBUMIN SERPL ELPH-MCNC: 2.6 G/DL — LOW (ref 3.3–5)
ALBUMIN SERPL ELPH-MCNC: 2.7 G/DL — LOW (ref 3.3–5)
ALBUMIN SERPL ELPH-MCNC: 3.1 G/DL — LOW (ref 3.3–5)
ALBUMIN SERPL ELPH-MCNC: 3.2 G/DL — LOW (ref 3.3–5)
ALBUMIN SERPL ELPH-MCNC: 3.6 G/DL — SIGNIFICANT CHANGE UP (ref 3.3–5)
ALBUMIN SERPL ELPH-MCNC: 3.7 G/DL — SIGNIFICANT CHANGE UP (ref 3.3–5)
ALBUMIN SERPL ELPH-MCNC: 4.2 G/DL — SIGNIFICANT CHANGE UP (ref 3.3–5)
ALBUMIN SERPL ELPH-MCNC: 4.7 G/DL
ALP BLD-CCNC: 81 U/L
ALP SERPL-CCNC: 107 U/L — SIGNIFICANT CHANGE UP (ref 40–120)
ALP SERPL-CCNC: 130 U/L — HIGH (ref 40–120)
ALP SERPL-CCNC: 201 U/L — HIGH (ref 40–120)
ALP SERPL-CCNC: 206 U/L — HIGH (ref 40–120)
ALP SERPL-CCNC: 206 U/L — HIGH (ref 40–120)
ALP SERPL-CCNC: 213 U/L — HIGH (ref 40–120)
ALP SERPL-CCNC: 215 U/L — HIGH (ref 40–120)
ALP SERPL-CCNC: 234 U/L — HIGH (ref 40–120)
ALP SERPL-CCNC: 246 U/L — HIGH (ref 40–120)
ALP SERPL-CCNC: 254 U/L — HIGH (ref 40–120)
ALP SERPL-CCNC: 49 U/L — SIGNIFICANT CHANGE UP (ref 40–120)
ALP SERPL-CCNC: 52 U/L — SIGNIFICANT CHANGE UP (ref 40–120)
ALP SERPL-CCNC: 55 U/L — SIGNIFICANT CHANGE UP (ref 40–120)
ALP SERPL-CCNC: 57 U/L — SIGNIFICANT CHANGE UP (ref 40–120)
ALP SERPL-CCNC: 61 U/L — SIGNIFICANT CHANGE UP (ref 40–120)
ALP SERPL-CCNC: 75 U/L — SIGNIFICANT CHANGE UP (ref 40–120)
ALP SERPL-CCNC: 87 U/L — SIGNIFICANT CHANGE UP (ref 40–120)
ALP SERPL-CCNC: 94 U/L — SIGNIFICANT CHANGE UP (ref 40–120)
ALT FLD-CCNC: 22 U/L — SIGNIFICANT CHANGE UP (ref 10–45)
ALT FLD-CCNC: 22 U/L — SIGNIFICANT CHANGE UP (ref 10–45)
ALT FLD-CCNC: 25 U/L — SIGNIFICANT CHANGE UP (ref 10–45)
ALT FLD-CCNC: 26 U/L — SIGNIFICANT CHANGE UP (ref 10–45)
ALT FLD-CCNC: 27 U/L — SIGNIFICANT CHANGE UP (ref 10–45)
ALT FLD-CCNC: 28 U/L — SIGNIFICANT CHANGE UP (ref 10–45)
ALT FLD-CCNC: 30 U/L — SIGNIFICANT CHANGE UP (ref 10–45)
ALT FLD-CCNC: 30 U/L — SIGNIFICANT CHANGE UP (ref 10–45)
ALT FLD-CCNC: 31 U/L — SIGNIFICANT CHANGE UP (ref 10–45)
ALT FLD-CCNC: 33 U/L — SIGNIFICANT CHANGE UP (ref 10–45)
ALT FLD-CCNC: 35 U/L — SIGNIFICANT CHANGE UP (ref 10–45)
ALT FLD-CCNC: 35 U/L — SIGNIFICANT CHANGE UP (ref 10–45)
ALT FLD-CCNC: 46 U/L — HIGH (ref 10–45)
ALT FLD-CCNC: 46 U/L — HIGH (ref 10–45)
ALT FLD-CCNC: 48 U/L — HIGH (ref 10–45)
ALT FLD-CCNC: 53 U/L — HIGH (ref 10–45)
ALT FLD-CCNC: 57 U/L — HIGH (ref 10–45)
ALT FLD-CCNC: 66 U/L — HIGH (ref 10–45)
ALT SERPL-CCNC: 14 U/L
ANION GAP SERPL CALC-SCNC: 12 MMOL/L — SIGNIFICANT CHANGE UP (ref 5–17)
ANION GAP SERPL CALC-SCNC: 14 MMOL/L — SIGNIFICANT CHANGE UP (ref 5–17)
ANION GAP SERPL CALC-SCNC: 15 MMOL/L
ANION GAP SERPL CALC-SCNC: 15 MMOL/L — SIGNIFICANT CHANGE UP (ref 5–17)
ANION GAP SERPL CALC-SCNC: 16 MMOL/L — SIGNIFICANT CHANGE UP (ref 5–17)
ANION GAP SERPL CALC-SCNC: 17 MMOL/L — SIGNIFICANT CHANGE UP (ref 5–17)
ANION GAP SERPL CALC-SCNC: 18 MMOL/L — HIGH (ref 5–17)
ANION GAP SERPL CALC-SCNC: 19 MMOL/L — HIGH (ref 5–17)
ANION GAP SERPL CALC-SCNC: 19 MMOL/L — HIGH (ref 5–17)
ANION GAP SERPL CALC-SCNC: 20 MMOL/L — HIGH (ref 5–17)
ANION GAP SERPL CALC-SCNC: 22 MMOL/L — HIGH (ref 5–17)
APPEARANCE UR: ABNORMAL
APTT BLD: 30.2 SEC — SIGNIFICANT CHANGE UP (ref 27.5–36.3)
APTT BLD: 32.6 SEC — SIGNIFICANT CHANGE UP (ref 27.5–36.3)
APTT BLD: 34.3 SEC — SIGNIFICANT CHANGE UP (ref 27.5–36.3)
APTT BLD: 35.3 SEC — SIGNIFICANT CHANGE UP (ref 27.5–36.3)
APTT BLD: 37 SEC — HIGH (ref 27.5–36.3)
AST SERPL-CCNC: 109 U/L — HIGH (ref 10–40)
AST SERPL-CCNC: 121 U/L — HIGH (ref 10–40)
AST SERPL-CCNC: 159 U/L — HIGH (ref 10–40)
AST SERPL-CCNC: 16 U/L
AST SERPL-CCNC: 31 U/L — SIGNIFICANT CHANGE UP (ref 10–40)
AST SERPL-CCNC: 36 U/L — SIGNIFICANT CHANGE UP (ref 10–40)
AST SERPL-CCNC: 38 U/L — SIGNIFICANT CHANGE UP (ref 10–40)
AST SERPL-CCNC: 40 U/L — SIGNIFICANT CHANGE UP (ref 10–40)
AST SERPL-CCNC: 41 U/L — HIGH (ref 10–40)
AST SERPL-CCNC: 43 U/L — HIGH (ref 10–40)
AST SERPL-CCNC: 50 U/L — HIGH (ref 10–40)
AST SERPL-CCNC: 58 U/L — HIGH (ref 10–40)
AST SERPL-CCNC: 59 U/L — HIGH (ref 10–40)
AST SERPL-CCNC: 64 U/L — HIGH (ref 10–40)
AST SERPL-CCNC: 68 U/L — HIGH (ref 10–40)
AST SERPL-CCNC: 68 U/L — HIGH (ref 10–40)
AST SERPL-CCNC: 92 U/L — HIGH (ref 10–40)
AST SERPL-CCNC: 95 U/L — HIGH (ref 10–40)
AST SERPL-CCNC: 97 U/L — HIGH (ref 10–40)
BACTERIA # UR AUTO: ABNORMAL
BASE EXCESS BLDA CALC-SCNC: -1.5 MMOL/L — SIGNIFICANT CHANGE UP (ref -2–2)
BASE EXCESS BLDA CALC-SCNC: -10.2 MMOL/L — LOW (ref -2–2)
BASE EXCESS BLDA CALC-SCNC: -12.9 MMOL/L — LOW (ref -2–2)
BASOPHILS # BLD AUTO: 0 K/UL — SIGNIFICANT CHANGE UP (ref 0–0.2)
BASOPHILS # BLD AUTO: 0 K/UL — SIGNIFICANT CHANGE UP (ref 0–0.2)
BASOPHILS # BLD AUTO: 0.02 K/UL — SIGNIFICANT CHANGE UP (ref 0–0.2)
BASOPHILS # BLD AUTO: 0.02 K/UL — SIGNIFICANT CHANGE UP (ref 0–0.2)
BASOPHILS # BLD AUTO: 0.05 K/UL
BASOPHILS NFR BLD AUTO: 0 % — SIGNIFICANT CHANGE UP (ref 0–2)
BASOPHILS NFR BLD AUTO: 0 % — SIGNIFICANT CHANGE UP (ref 0–2)
BASOPHILS NFR BLD AUTO: 0.2 % — SIGNIFICANT CHANGE UP (ref 0–2)
BASOPHILS NFR BLD AUTO: 0.5 % — SIGNIFICANT CHANGE UP (ref 0–2)
BASOPHILS NFR BLD AUTO: 0.9 %
BILIRUB DIRECT SERPL-MCNC: 0.3 MG/DL — HIGH (ref 0–0.2)
BILIRUB INDIRECT FLD-MCNC: 0.2 MG/DL — SIGNIFICANT CHANGE UP (ref 0.2–1)
BILIRUB SERPL-MCNC: 0.2 MG/DL
BILIRUB SERPL-MCNC: 0.3 MG/DL — SIGNIFICANT CHANGE UP (ref 0.2–1.2)
BILIRUB SERPL-MCNC: 0.4 MG/DL — SIGNIFICANT CHANGE UP (ref 0.2–1.2)
BILIRUB SERPL-MCNC: 0.5 MG/DL — SIGNIFICANT CHANGE UP (ref 0.2–1.2)
BILIRUB SERPL-MCNC: 0.5 MG/DL — SIGNIFICANT CHANGE UP (ref 0.2–1.2)
BILIRUB SERPL-MCNC: 0.6 MG/DL — SIGNIFICANT CHANGE UP (ref 0.2–1.2)
BILIRUB SERPL-MCNC: 0.8 MG/DL — SIGNIFICANT CHANGE UP (ref 0.2–1.2)
BILIRUB SERPL-MCNC: 0.8 MG/DL — SIGNIFICANT CHANGE UP (ref 0.2–1.2)
BILIRUB UR-MCNC: NEGATIVE — SIGNIFICANT CHANGE UP
BUN SERPL-MCNC: 14 MG/DL — SIGNIFICANT CHANGE UP (ref 7–23)
BUN SERPL-MCNC: 16 MG/DL — SIGNIFICANT CHANGE UP (ref 7–23)
BUN SERPL-MCNC: 18 MG/DL — SIGNIFICANT CHANGE UP (ref 7–23)
BUN SERPL-MCNC: 20 MG/DL — SIGNIFICANT CHANGE UP (ref 7–23)
BUN SERPL-MCNC: 21 MG/DL — SIGNIFICANT CHANGE UP (ref 7–23)
BUN SERPL-MCNC: 22 MG/DL — SIGNIFICANT CHANGE UP (ref 7–23)
BUN SERPL-MCNC: 23 MG/DL — SIGNIFICANT CHANGE UP (ref 7–23)
BUN SERPL-MCNC: 24 MG/DL — HIGH (ref 7–23)
BUN SERPL-MCNC: 29 MG/DL — HIGH (ref 7–23)
BUN SERPL-MCNC: 29 MG/DL — HIGH (ref 7–23)
BUN SERPL-MCNC: 32 MG/DL
BUN SERPL-MCNC: 37 MG/DL — HIGH (ref 7–23)
BUN SERPL-MCNC: 37 MG/DL — HIGH (ref 7–23)
BUN SERPL-MCNC: 44 MG/DL — HIGH (ref 7–23)
BUN SERPL-MCNC: 56 MG/DL — HIGH (ref 7–23)
BUN SERPL-MCNC: 56 MG/DL — HIGH (ref 7–23)
BUN SERPL-MCNC: 58 MG/DL — HIGH (ref 7–23)
BUN SERPL-MCNC: 64 MG/DL — HIGH (ref 7–23)
CALCIUM SERPL-MCNC: 10.1 MG/DL — SIGNIFICANT CHANGE UP (ref 8.4–10.5)
CALCIUM SERPL-MCNC: 8 MG/DL — LOW (ref 8.4–10.5)
CALCIUM SERPL-MCNC: 8.1 MG/DL — LOW (ref 8.4–10.5)
CALCIUM SERPL-MCNC: 8.1 MG/DL — LOW (ref 8.4–10.5)
CALCIUM SERPL-MCNC: 8.4 MG/DL — SIGNIFICANT CHANGE UP (ref 8.4–10.5)
CALCIUM SERPL-MCNC: 8.5 MG/DL — SIGNIFICANT CHANGE UP (ref 8.4–10.5)
CALCIUM SERPL-MCNC: 8.5 MG/DL — SIGNIFICANT CHANGE UP (ref 8.4–10.5)
CALCIUM SERPL-MCNC: 8.6 MG/DL — SIGNIFICANT CHANGE UP (ref 8.4–10.5)
CALCIUM SERPL-MCNC: 8.9 MG/DL — SIGNIFICANT CHANGE UP (ref 8.4–10.5)
CALCIUM SERPL-MCNC: 8.9 MG/DL — SIGNIFICANT CHANGE UP (ref 8.4–10.5)
CALCIUM SERPL-MCNC: 9.2 MG/DL — SIGNIFICANT CHANGE UP (ref 8.4–10.5)
CALCIUM SERPL-MCNC: 9.5 MG/DL
CALCIUM SERPL-MCNC: 9.6 MG/DL — SIGNIFICANT CHANGE UP (ref 8.4–10.5)
CALCIUM SERPL-MCNC: 9.8 MG/DL — SIGNIFICANT CHANGE UP (ref 8.4–10.5)
CD3 BLASTS SPEC-ACNC: 184 /UL — LOW (ref 396–2024)
CD3 BLASTS SPEC-ACNC: 35 % — LOW (ref 58–84)
CD3 BLASTS SPEC-ACNC: 399 /UL — SIGNIFICANT CHANGE UP (ref 396–2024)
CD3 BLASTS SPEC-ACNC: 440 /UL — SIGNIFICANT CHANGE UP (ref 396–2024)
CD3 BLASTS SPEC-ACNC: 463 /UL — SIGNIFICANT CHANGE UP (ref 396–2024)
CD3 BLASTS SPEC-ACNC: 54 % — LOW (ref 58–84)
CD3 BLASTS SPEC-ACNC: 67 % — SIGNIFICANT CHANGE UP (ref 58–84)
CD3 BLASTS SPEC-ACNC: 68 % — SIGNIFICANT CHANGE UP (ref 58–84)
CD4 %: 23 % — LOW (ref 30–56)
CD4 %: 40 % — SIGNIFICANT CHANGE UP (ref 30–56)
CD4 %: 48 % — SIGNIFICANT CHANGE UP (ref 30–56)
CD4 %: 50 % — SIGNIFICANT CHANGE UP (ref 30–56)
CD8 %: 11 % — SIGNIFICANT CHANGE UP (ref 11–43)
CD8 %: 13 % — SIGNIFICANT CHANGE UP (ref 11–43)
CD8 %: 17 % — SIGNIFICANT CHANGE UP (ref 11–43)
CD8 %: 17 % — SIGNIFICANT CHANGE UP (ref 11–43)
CHLORIDE SERPL-SCNC: 100 MMOL/L — SIGNIFICANT CHANGE UP (ref 96–108)
CHLORIDE SERPL-SCNC: 100 MMOL/L — SIGNIFICANT CHANGE UP (ref 96–108)
CHLORIDE SERPL-SCNC: 101 MMOL/L — SIGNIFICANT CHANGE UP (ref 96–108)
CHLORIDE SERPL-SCNC: 102 MMOL/L — SIGNIFICANT CHANGE UP (ref 96–108)
CHLORIDE SERPL-SCNC: 103 MMOL/L — SIGNIFICANT CHANGE UP (ref 96–108)
CHLORIDE SERPL-SCNC: 104 MMOL/L
CHLORIDE SERPL-SCNC: 104 MMOL/L — SIGNIFICANT CHANGE UP (ref 96–108)
CHLORIDE SERPL-SCNC: 105 MMOL/L — SIGNIFICANT CHANGE UP (ref 96–108)
CHLORIDE SERPL-SCNC: 106 MMOL/L — SIGNIFICANT CHANGE UP (ref 96–108)
CHLORIDE SERPL-SCNC: 94 MMOL/L — LOW (ref 96–108)
CHLORIDE SERPL-SCNC: 97 MMOL/L — SIGNIFICANT CHANGE UP (ref 96–108)
CHLORIDE SERPL-SCNC: 98 MMOL/L — SIGNIFICANT CHANGE UP (ref 96–108)
CHLORIDE SERPL-SCNC: 99 MMOL/L — SIGNIFICANT CHANGE UP (ref 96–108)
CHOLEST SERPL-MCNC: 169 MG/DL
CHOLEST/HDLC SERPL: 3.8 RATIO
CK MB BLD-MCNC: 9 % — HIGH (ref 0–3.5)
CK MB CFR SERPL CALC: 7.1 NG/ML — HIGH (ref 0–3.8)
CK SERPL-CCNC: 130 U/L — SIGNIFICANT CHANGE UP (ref 25–170)
CK SERPL-CCNC: 391 U/L — HIGH (ref 25–170)
CK SERPL-CCNC: 402 U/L — HIGH (ref 25–170)
CK SERPL-CCNC: 442 U/L — HIGH (ref 25–170)
CK SERPL-CCNC: 79 U/L — SIGNIFICANT CHANGE UP (ref 25–170)
CK SERPL-CCNC: 94 U/L — SIGNIFICANT CHANGE UP (ref 25–170)
CO2 BLDA-SCNC: 20 MMOL/L — LOW (ref 22–30)
CO2 BLDA-SCNC: 21 MMOL/L — LOW (ref 22–30)
CO2 BLDA-SCNC: 23 MMOL/L — SIGNIFICANT CHANGE UP (ref 22–30)
CO2 SERPL-SCNC: 15 MMOL/L — LOW (ref 22–31)
CO2 SERPL-SCNC: 16 MMOL/L — LOW (ref 22–31)
CO2 SERPL-SCNC: 16 MMOL/L — LOW (ref 22–31)
CO2 SERPL-SCNC: 17 MMOL/L — LOW (ref 22–31)
CO2 SERPL-SCNC: 18 MMOL/L — LOW (ref 22–31)
CO2 SERPL-SCNC: 19 MMOL/L — LOW (ref 22–31)
CO2 SERPL-SCNC: 20 MMOL/L — LOW (ref 22–31)
CO2 SERPL-SCNC: 21 MMOL/L — LOW (ref 22–31)
CO2 SERPL-SCNC: 22 MMOL/L — SIGNIFICANT CHANGE UP (ref 22–31)
CO2 SERPL-SCNC: 24 MMOL/L
COLOR SPEC: YELLOW — SIGNIFICANT CHANGE UP
CREAT SERPL-MCNC: 0.7 MG/DL — SIGNIFICANT CHANGE UP (ref 0.5–1.3)
CREAT SERPL-MCNC: 0.79 MG/DL — SIGNIFICANT CHANGE UP (ref 0.5–1.3)
CREAT SERPL-MCNC: 0.81 MG/DL — SIGNIFICANT CHANGE UP (ref 0.5–1.3)
CREAT SERPL-MCNC: 0.89 MG/DL — SIGNIFICANT CHANGE UP (ref 0.5–1.3)
CREAT SERPL-MCNC: 0.97 MG/DL — SIGNIFICANT CHANGE UP (ref 0.5–1.3)
CREAT SERPL-MCNC: 0.99 MG/DL — SIGNIFICANT CHANGE UP (ref 0.5–1.3)
CREAT SERPL-MCNC: 1.01 MG/DL — SIGNIFICANT CHANGE UP (ref 0.5–1.3)
CREAT SERPL-MCNC: 1.08 MG/DL
CREAT SERPL-MCNC: 1.13 MG/DL — SIGNIFICANT CHANGE UP (ref 0.5–1.3)
CREAT SERPL-MCNC: 1.24 MG/DL — SIGNIFICANT CHANGE UP (ref 0.5–1.3)
CREAT SERPL-MCNC: 1.27 MG/DL — SIGNIFICANT CHANGE UP (ref 0.5–1.3)
CREAT SERPL-MCNC: 1.5 MG/DL — HIGH (ref 0.5–1.3)
CREAT SERPL-MCNC: 1.6 MG/DL — HIGH (ref 0.5–1.3)
CREAT SERPL-MCNC: 1.97 MG/DL — HIGH (ref 0.5–1.3)
CREAT SERPL-MCNC: 2.12 MG/DL — HIGH (ref 0.5–1.3)
CREAT SERPL-MCNC: 2.32 MG/DL — HIGH (ref 0.5–1.3)
CREAT SERPL-MCNC: 3.14 MG/DL — HIGH (ref 0.5–1.3)
CREAT SERPL-MCNC: 3.25 MG/DL — HIGH (ref 0.5–1.3)
CREAT SERPL-MCNC: 3.62 MG/DL — HIGH (ref 0.5–1.3)
CREAT SERPL-MCNC: 3.87 MG/DL — HIGH (ref 0.5–1.3)
CRP SERPL-MCNC: 11.81 MG/DL — HIGH (ref 0–0.4)
CRP SERPL-MCNC: 28.39 MG/DL — HIGH (ref 0–0.4)
CRP SERPL-MCNC: 34.71 MG/DL — HIGH (ref 0–0.4)
CRP SERPL-MCNC: 55.98 MG/DL — HIGH (ref 0–0.4)
CRP SERPL-MCNC: 6.3 MG/DL — HIGH (ref 0–0.4)
CULTURE RESULTS: NO GROWTH — SIGNIFICANT CHANGE UP
CULTURE RESULTS: SIGNIFICANT CHANGE UP
CULTURE RESULTS: SIGNIFICANT CHANGE UP
D DIMER BLD IA.RAPID-MCNC: 1205 NG/ML DDU — HIGH
D DIMER BLD IA.RAPID-MCNC: 4405 NG/ML DDU — HIGH
D DIMER BLD IA.RAPID-MCNC: 492 NG/ML DDU — HIGH
D DIMER BLD IA.RAPID-MCNC: 586 NG/ML DDU — HIGH
D DIMER BLD IA.RAPID-MCNC: 691 NG/ML DDU — HIGH
DIFF PNL FLD: ABNORMAL
EOSINOPHIL # BLD AUTO: 0 K/UL — SIGNIFICANT CHANGE UP (ref 0–0.5)
EOSINOPHIL # BLD AUTO: 0.04 K/UL — SIGNIFICANT CHANGE UP (ref 0–0.5)
EOSINOPHIL # BLD AUTO: 0.21 K/UL
EOSINOPHIL NFR BLD AUTO: 0 % — SIGNIFICANT CHANGE UP (ref 0–6)
EOSINOPHIL NFR BLD AUTO: 0.5 % — SIGNIFICANT CHANGE UP (ref 0–6)
EOSINOPHIL NFR BLD AUTO: 3.8 %
EPI CELLS # UR: 2 /HPF — SIGNIFICANT CHANGE UP
ERYTHROCYTE [SEDIMENTATION RATE] IN BLOOD: 18 MM/HR — SIGNIFICANT CHANGE UP (ref 0–20)
ERYTHROCYTE [SEDIMENTATION RATE] IN BLOOD: 36 MM/HR — HIGH (ref 0–20)
ESTIMATED AVERAGE GLUCOSE: 134 MG/DL
FERRITIN SERPL-MCNC: 1588 NG/ML — HIGH (ref 15–150)
FERRITIN SERPL-MCNC: 2743 NG/ML — HIGH (ref 15–150)
FERRITIN SERPL-MCNC: 3083 NG/ML — HIGH (ref 15–150)
FERRITIN SERPL-MCNC: 4142 NG/ML — HIGH (ref 15–150)
FERRITIN SERPL-MCNC: 857 NG/ML — HIGH (ref 15–150)
FIBRINOGEN PPP-MCNC: 628 MG/DL — HIGH (ref 320–500)
G6PD RBC-CCNC: 11.4 U/G HGB — SIGNIFICANT CHANGE UP (ref 7–20.5)
GAS PNL BLDA: SIGNIFICANT CHANGE UP
GAS PNL BLDV: SIGNIFICANT CHANGE UP
GLUCOSE BLDC GLUCOMTR-MCNC: 104 MG/DL — HIGH (ref 70–99)
GLUCOSE BLDC GLUCOMTR-MCNC: 112 MG/DL — HIGH (ref 70–99)
GLUCOSE BLDC GLUCOMTR-MCNC: 120 MG/DL — HIGH (ref 70–99)
GLUCOSE BLDC GLUCOMTR-MCNC: 130 MG/DL — HIGH (ref 70–99)
GLUCOSE BLDC GLUCOMTR-MCNC: 156 MG/DL — HIGH (ref 70–99)
GLUCOSE BLDC GLUCOMTR-MCNC: 159 MG/DL — HIGH (ref 70–99)
GLUCOSE BLDC GLUCOMTR-MCNC: 166 MG/DL — HIGH (ref 70–99)
GLUCOSE BLDC GLUCOMTR-MCNC: 168 MG/DL — HIGH (ref 70–99)
GLUCOSE BLDC GLUCOMTR-MCNC: 177 MG/DL — HIGH (ref 70–99)
GLUCOSE BLDC GLUCOMTR-MCNC: 177 MG/DL — HIGH (ref 70–99)
GLUCOSE BLDC GLUCOMTR-MCNC: 179 MG/DL — HIGH (ref 70–99)
GLUCOSE BLDC GLUCOMTR-MCNC: 187 MG/DL — HIGH (ref 70–99)
GLUCOSE BLDC GLUCOMTR-MCNC: 191 MG/DL — HIGH (ref 70–99)
GLUCOSE BLDC GLUCOMTR-MCNC: 194 MG/DL — HIGH (ref 70–99)
GLUCOSE BLDC GLUCOMTR-MCNC: 198 MG/DL — HIGH (ref 70–99)
GLUCOSE BLDC GLUCOMTR-MCNC: 199 MG/DL — HIGH (ref 70–99)
GLUCOSE BLDC GLUCOMTR-MCNC: 202 MG/DL — HIGH (ref 70–99)
GLUCOSE SERPL-MCNC: 105 MG/DL — HIGH (ref 70–99)
GLUCOSE SERPL-MCNC: 119 MG/DL — HIGH (ref 70–99)
GLUCOSE SERPL-MCNC: 125 MG/DL — HIGH (ref 70–99)
GLUCOSE SERPL-MCNC: 128 MG/DL — HIGH (ref 70–99)
GLUCOSE SERPL-MCNC: 130 MG/DL — HIGH (ref 70–99)
GLUCOSE SERPL-MCNC: 134 MG/DL — HIGH (ref 70–99)
GLUCOSE SERPL-MCNC: 137 MG/DL — HIGH (ref 70–99)
GLUCOSE SERPL-MCNC: 142 MG/DL
GLUCOSE SERPL-MCNC: 142 MG/DL — HIGH (ref 70–99)
GLUCOSE SERPL-MCNC: 151 MG/DL — HIGH (ref 70–99)
GLUCOSE SERPL-MCNC: 152 MG/DL — HIGH (ref 70–99)
GLUCOSE SERPL-MCNC: 160 MG/DL — HIGH (ref 70–99)
GLUCOSE SERPL-MCNC: 164 MG/DL — HIGH (ref 70–99)
GLUCOSE SERPL-MCNC: 165 MG/DL — HIGH (ref 70–99)
GLUCOSE SERPL-MCNC: 179 MG/DL — HIGH (ref 70–99)
GLUCOSE SERPL-MCNC: 183 MG/DL — HIGH (ref 70–99)
GLUCOSE SERPL-MCNC: 187 MG/DL — HIGH (ref 70–99)
GLUCOSE SERPL-MCNC: 220 MG/DL — HIGH (ref 70–99)
GLUCOSE SERPL-MCNC: 228 MG/DL — HIGH (ref 70–99)
GLUCOSE SERPL-MCNC: 276 MG/DL — HIGH (ref 70–99)
GLUCOSE UR QL: NEGATIVE — SIGNIFICANT CHANGE UP
GRAN CASTS # UR COMP ASSIST: 2 /LPF — SIGNIFICANT CHANGE UP
HBA1C BLD-MCNC: 6.3 % — HIGH (ref 4–5.6)
HBA1C MFR BLD HPLC: 6.3 %
HCO3 BLDA-SCNC: 18 MMOL/L — LOW (ref 21–29)
HCO3 BLDA-SCNC: 20 MMOL/L — LOW (ref 21–29)
HCO3 BLDA-SCNC: 20 MMOL/L — LOW (ref 21–29)
HCT VFR BLD CALC: 29.7 % — LOW (ref 34.5–45)
HCT VFR BLD CALC: 31.4 % — LOW (ref 34.5–45)
HCT VFR BLD CALC: 31.7 % — LOW (ref 34.5–45)
HCT VFR BLD CALC: 32.1 % — LOW (ref 34.5–45)
HCT VFR BLD CALC: 33.5 % — LOW (ref 34.5–45)
HCT VFR BLD CALC: 33.6 % — LOW (ref 34.5–45)
HCT VFR BLD CALC: 34.7 % — SIGNIFICANT CHANGE UP (ref 34.5–45)
HCT VFR BLD CALC: 34.8 % — SIGNIFICANT CHANGE UP (ref 34.5–45)
HCT VFR BLD CALC: 34.8 % — SIGNIFICANT CHANGE UP (ref 34.5–45)
HCT VFR BLD CALC: 36 % — SIGNIFICANT CHANGE UP (ref 34.5–45)
HCT VFR BLD CALC: 38.3 % — SIGNIFICANT CHANGE UP (ref 34.5–45)
HCT VFR BLD CALC: 41.1 % — SIGNIFICANT CHANGE UP (ref 34.5–45)
HCT VFR BLD CALC: 41.9 % — SIGNIFICANT CHANGE UP (ref 34.5–45)
HCT VFR BLD CALC: 42.9 %
HDLC SERPL-MCNC: 44 MG/DL
HGB BLD-MCNC: 10.4 G/DL — LOW (ref 11.5–15.5)
HGB BLD-MCNC: 10.4 G/DL — LOW (ref 11.5–15.5)
HGB BLD-MCNC: 10.7 G/DL — LOW (ref 11.5–15.5)
HGB BLD-MCNC: 10.8 G/DL — LOW (ref 11.5–15.5)
HGB BLD-MCNC: 11.7 G/DL — SIGNIFICANT CHANGE UP (ref 11.5–15.5)
HGB BLD-MCNC: 11.7 G/DL — SIGNIFICANT CHANGE UP (ref 11.5–15.5)
HGB BLD-MCNC: 11.8 G/DL — SIGNIFICANT CHANGE UP (ref 11.5–15.5)
HGB BLD-MCNC: 12.2 G/DL — SIGNIFICANT CHANGE UP (ref 11.5–15.5)
HGB BLD-MCNC: 12.9 G/DL — SIGNIFICANT CHANGE UP (ref 11.5–15.5)
HGB BLD-MCNC: 13.6 G/DL
HGB BLD-MCNC: 14 G/DL — SIGNIFICANT CHANGE UP (ref 11.5–15.5)
HGB BLD-MCNC: 14.1 G/DL — SIGNIFICANT CHANGE UP (ref 11.5–15.5)
HOROWITZ INDEX BLDA+IHG-RTO: 100 — SIGNIFICANT CHANGE UP
HOROWITZ INDEX BLDA+IHG-RTO: 60 — SIGNIFICANT CHANGE UP
HYALINE CASTS # UR AUTO: 30 /LPF — HIGH (ref 0–2)
IL6 FLD-MCNC: 164 PG/ML — HIGH (ref 0–15.5)
IMM GRANULOCYTES NFR BLD AUTO: 0.2 %
IMM GRANULOCYTES NFR BLD AUTO: 0.2 % — SIGNIFICANT CHANGE UP (ref 0–1.5)
IMM GRANULOCYTES NFR BLD AUTO: 0.2 % — SIGNIFICANT CHANGE UP (ref 0–1.5)
IMM GRANULOCYTES NFR BLD AUTO: 1.8 % — HIGH (ref 0–1.5)
INR BLD: 1.09 RATIO — SIGNIFICANT CHANGE UP (ref 0.88–1.16)
INR BLD: 1.12 RATIO — SIGNIFICANT CHANGE UP (ref 0.88–1.16)
INR BLD: 1.15 RATIO — SIGNIFICANT CHANGE UP (ref 0.88–1.16)
INR BLD: 1.19 RATIO — HIGH (ref 0.88–1.16)
INR BLD: 1.25 RATIO — HIGH (ref 0.88–1.16)
KETONES UR-MCNC: NEGATIVE — SIGNIFICANT CHANGE UP
LACTATE SERPL-SCNC: 1 MMOL/L — SIGNIFICANT CHANGE UP (ref 0.7–2)
LDH SERPL L TO P-CCNC: 1023 U/L — HIGH (ref 50–242)
LDH SERPL L TO P-CCNC: 729 U/L — HIGH (ref 50–242)
LDH SERPL L TO P-CCNC: 798 U/L — HIGH (ref 50–242)
LDH SERPL L TO P-CCNC: 822 U/L — HIGH (ref 50–242)
LDLC SERPL CALC-MCNC: 96 MG/DL
LEUKOCYTE ESTERASE UR-ACNC: ABNORMAL
LYMPHOCYTES # BLD AUTO: 0.55 K/UL — LOW (ref 1–3.3)
LYMPHOCYTES # BLD AUTO: 0.71 K/UL — LOW (ref 1–3.3)
LYMPHOCYTES # BLD AUTO: 0.72 K/UL — LOW (ref 1–3.3)
LYMPHOCYTES # BLD AUTO: 0.74 K/UL — LOW (ref 1–3.3)
LYMPHOCYTES # BLD AUTO: 1.55 K/UL
LYMPHOCYTES # BLD AUTO: 12.9 % — LOW (ref 13–44)
LYMPHOCYTES # BLD AUTO: 16.7 % — SIGNIFICANT CHANGE UP (ref 13–44)
LYMPHOCYTES # BLD AUTO: 4.4 % — LOW (ref 13–44)
LYMPHOCYTES # BLD AUTO: 8.4 % — LOW (ref 13–44)
LYMPHOCYTES NFR BLD AUTO: 28 %
MAGNESIUM SERPL-MCNC: 1.7 MG/DL — SIGNIFICANT CHANGE UP (ref 1.6–2.6)
MAGNESIUM SERPL-MCNC: 1.9 MG/DL — SIGNIFICANT CHANGE UP (ref 1.6–2.6)
MAGNESIUM SERPL-MCNC: 2 MG/DL — SIGNIFICANT CHANGE UP (ref 1.6–2.6)
MAGNESIUM SERPL-MCNC: 2.1 MG/DL — SIGNIFICANT CHANGE UP (ref 1.6–2.6)
MAGNESIUM SERPL-MCNC: 2.4 MG/DL — SIGNIFICANT CHANGE UP (ref 1.6–2.6)
MAGNESIUM SERPL-MCNC: 2.5 MG/DL — SIGNIFICANT CHANGE UP (ref 1.6–2.6)
MAGNESIUM SERPL-MCNC: 2.7 MG/DL — HIGH (ref 1.6–2.6)
MAGNESIUM SERPL-MCNC: 2.9 MG/DL — HIGH (ref 1.6–2.6)
MAN DIFF?: NORMAL
MCHC RBC-ENTMCNC: 28.8 PG — SIGNIFICANT CHANGE UP (ref 27–34)
MCHC RBC-ENTMCNC: 29.3 PG — SIGNIFICANT CHANGE UP (ref 27–34)
MCHC RBC-ENTMCNC: 29.6 PG — SIGNIFICANT CHANGE UP (ref 27–34)
MCHC RBC-ENTMCNC: 29.7 PG — SIGNIFICANT CHANGE UP (ref 27–34)
MCHC RBC-ENTMCNC: 29.8 PG
MCHC RBC-ENTMCNC: 29.8 PG — SIGNIFICANT CHANGE UP (ref 27–34)
MCHC RBC-ENTMCNC: 29.8 PG — SIGNIFICANT CHANGE UP (ref 27–34)
MCHC RBC-ENTMCNC: 29.9 PG — SIGNIFICANT CHANGE UP (ref 27–34)
MCHC RBC-ENTMCNC: 29.9 PG — SIGNIFICANT CHANGE UP (ref 27–34)
MCHC RBC-ENTMCNC: 30.1 PG — SIGNIFICANT CHANGE UP (ref 27–34)
MCHC RBC-ENTMCNC: 30.1 PG — SIGNIFICANT CHANGE UP (ref 27–34)
MCHC RBC-ENTMCNC: 31.7 GM/DL
MCHC RBC-ENTMCNC: 31.8 GM/DL — LOW (ref 32–36)
MCHC RBC-ENTMCNC: 32.2 GM/DL — SIGNIFICANT CHANGE UP (ref 32–36)
MCHC RBC-ENTMCNC: 32.8 GM/DL — SIGNIFICANT CHANGE UP (ref 32–36)
MCHC RBC-ENTMCNC: 33.1 GM/DL — SIGNIFICANT CHANGE UP (ref 32–36)
MCHC RBC-ENTMCNC: 33.4 GM/DL — SIGNIFICANT CHANGE UP (ref 32–36)
MCHC RBC-ENTMCNC: 33.6 GM/DL — SIGNIFICANT CHANGE UP (ref 32–36)
MCHC RBC-ENTMCNC: 33.6 GM/DL — SIGNIFICANT CHANGE UP (ref 32–36)
MCHC RBC-ENTMCNC: 33.7 GM/DL — SIGNIFICANT CHANGE UP (ref 32–36)
MCHC RBC-ENTMCNC: 33.7 GM/DL — SIGNIFICANT CHANGE UP (ref 32–36)
MCHC RBC-ENTMCNC: 33.9 GM/DL — SIGNIFICANT CHANGE UP (ref 32–36)
MCHC RBC-ENTMCNC: 33.9 GM/DL — SIGNIFICANT CHANGE UP (ref 32–36)
MCHC RBC-ENTMCNC: 34.3 GM/DL — SIGNIFICANT CHANGE UP (ref 32–36)
MCHC RBC-ENTMCNC: 35.6 PG — HIGH (ref 27–34)
MCHC RBC-ENTMCNC: 36.4 GM/DL — HIGH (ref 32–36)
MCV RBC AUTO: 87.2 FL — SIGNIFICANT CHANGE UP (ref 80–100)
MCV RBC AUTO: 87.8 FL — SIGNIFICANT CHANGE UP (ref 80–100)
MCV RBC AUTO: 88.3 FL — SIGNIFICANT CHANGE UP (ref 80–100)
MCV RBC AUTO: 88.6 FL — SIGNIFICANT CHANGE UP (ref 80–100)
MCV RBC AUTO: 88.9 FL — SIGNIFICANT CHANGE UP (ref 80–100)
MCV RBC AUTO: 89.5 FL — SIGNIFICANT CHANGE UP (ref 80–100)
MCV RBC AUTO: 89.7 FL — SIGNIFICANT CHANGE UP (ref 80–100)
MCV RBC AUTO: 90.6 FL — SIGNIFICANT CHANGE UP (ref 80–100)
MCV RBC AUTO: 90.8 FL — SIGNIFICANT CHANGE UP (ref 80–100)
MCV RBC AUTO: 91.1 FL — SIGNIFICANT CHANGE UP (ref 80–100)
MCV RBC AUTO: 94.1 FL
MCV RBC AUTO: 98 FL — SIGNIFICANT CHANGE UP (ref 80–100)
MONOCYTES # BLD AUTO: 0.13 K/UL — SIGNIFICANT CHANGE UP (ref 0–0.9)
MONOCYTES # BLD AUTO: 0.21 K/UL — SIGNIFICANT CHANGE UP (ref 0–0.9)
MONOCYTES # BLD AUTO: 0.25 K/UL — SIGNIFICANT CHANGE UP (ref 0–0.9)
MONOCYTES # BLD AUTO: 0.39 K/UL
MONOCYTES # BLD AUTO: 0.55 K/UL — SIGNIFICANT CHANGE UP (ref 0–0.9)
MONOCYTES NFR BLD AUTO: 2.4 % — SIGNIFICANT CHANGE UP (ref 2–14)
MONOCYTES NFR BLD AUTO: 2.8 % — SIGNIFICANT CHANGE UP (ref 2–14)
MONOCYTES NFR BLD AUTO: 4.4 % — SIGNIFICANT CHANGE UP (ref 2–14)
MONOCYTES NFR BLD AUTO: 4.9 % — SIGNIFICANT CHANGE UP (ref 2–14)
MONOCYTES NFR BLD AUTO: 7 %
NEUTROPHILS # BLD AUTO: 10.72 K/UL — HIGH (ref 1.8–7.4)
NEUTROPHILS # BLD AUTO: 3.33 K/UL
NEUTROPHILS # BLD AUTO: 3.35 K/UL — SIGNIFICANT CHANGE UP (ref 1.8–7.4)
NEUTROPHILS # BLD AUTO: 4.66 K/UL — SIGNIFICANT CHANGE UP (ref 1.8–7.4)
NEUTROPHILS # BLD AUTO: 7.63 K/UL — HIGH (ref 1.8–7.4)
NEUTROPHILS NFR BLD AUTO: 60.1 %
NEUTROPHILS NFR BLD AUTO: 77.7 % — HIGH (ref 43–77)
NEUTROPHILS NFR BLD AUTO: 80.6 % — HIGH (ref 43–77)
NEUTROPHILS NFR BLD AUTO: 84.5 % — HIGH (ref 43–77)
NEUTROPHILS NFR BLD AUTO: 86.3 % — HIGH (ref 43–77)
NITRITE UR-MCNC: NEGATIVE — SIGNIFICANT CHANGE UP
NRBC # BLD: 0 /100 WBCS — SIGNIFICANT CHANGE UP (ref 0–0)
NT-PROBNP SERPL-SCNC: HIGH PG/ML (ref 0–300)
PCO2 BLDA: 26 MMHG — LOW (ref 32–46)
PCO2 BLDA: 65 MMHG — HIGH (ref 32–46)
PCO2 BLDA: 77 MMHG — CRITICAL HIGH (ref 32–46)
PH BLDA: 7.05 — CRITICAL LOW (ref 7.35–7.45)
PH BLDA: 7.06 — CRITICAL LOW (ref 7.35–7.45)
PH BLDA: 7.5 — HIGH (ref 7.35–7.45)
PH UR: 6 — SIGNIFICANT CHANGE UP (ref 5–8)
PHOSPHATE SERPL-MCNC: 2.5 MG/DL — SIGNIFICANT CHANGE UP (ref 2.5–4.5)
PHOSPHATE SERPL-MCNC: 3.2 MG/DL — SIGNIFICANT CHANGE UP (ref 2.5–4.5)
PHOSPHATE SERPL-MCNC: 3.3 MG/DL — SIGNIFICANT CHANGE UP (ref 2.5–4.5)
PHOSPHATE SERPL-MCNC: 3.5 MG/DL — SIGNIFICANT CHANGE UP (ref 2.5–4.5)
PHOSPHATE SERPL-MCNC: 3.5 MG/DL — SIGNIFICANT CHANGE UP (ref 2.5–4.5)
PHOSPHATE SERPL-MCNC: 3.6 MG/DL — SIGNIFICANT CHANGE UP (ref 2.5–4.5)
PHOSPHATE SERPL-MCNC: 4.6 MG/DL — HIGH (ref 2.5–4.5)
PHOSPHATE SERPL-MCNC: 5.1 MG/DL — HIGH (ref 2.5–4.5)
PHOSPHATE SERPL-MCNC: 5.7 MG/DL — HIGH (ref 2.5–4.5)
PHOSPHATE SERPL-MCNC: 8.1 MG/DL — HIGH (ref 2.5–4.5)
PHOSPHATE SERPL-MCNC: 8.5 MG/DL — HIGH (ref 2.5–4.5)
PLATELET # BLD AUTO: 109 K/UL — LOW (ref 150–400)
PLATELET # BLD AUTO: 132 K/UL — LOW (ref 150–400)
PLATELET # BLD AUTO: 139 K/UL — LOW (ref 150–400)
PLATELET # BLD AUTO: 142 K/UL — LOW (ref 150–400)
PLATELET # BLD AUTO: 146 K/UL — LOW (ref 150–400)
PLATELET # BLD AUTO: 147 K/UL — LOW (ref 150–400)
PLATELET # BLD AUTO: 154 K/UL — SIGNIFICANT CHANGE UP (ref 150–400)
PLATELET # BLD AUTO: 158 K/UL — SIGNIFICANT CHANGE UP (ref 150–400)
PLATELET # BLD AUTO: 165 K/UL — SIGNIFICANT CHANGE UP (ref 150–400)
PLATELET # BLD AUTO: 167 K/UL — SIGNIFICANT CHANGE UP (ref 150–400)
PLATELET # BLD AUTO: 177 K/UL — SIGNIFICANT CHANGE UP (ref 150–400)
PLATELET # BLD AUTO: 182 K/UL — SIGNIFICANT CHANGE UP (ref 150–400)
PLATELET # BLD AUTO: 207 K/UL — SIGNIFICANT CHANGE UP (ref 150–400)
PLATELET # BLD AUTO: 253 K/UL
PO2 BLDA: 186 MMHG — HIGH (ref 74–108)
PO2 BLDA: 302 MMHG — HIGH (ref 74–108)
PO2 BLDA: 74 MMHG — SIGNIFICANT CHANGE UP (ref 74–108)
POTASSIUM SERPL-MCNC: 3.5 MMOL/L — SIGNIFICANT CHANGE UP (ref 3.5–5.3)
POTASSIUM SERPL-MCNC: 3.6 MMOL/L — SIGNIFICANT CHANGE UP (ref 3.5–5.3)
POTASSIUM SERPL-MCNC: 3.7 MMOL/L — SIGNIFICANT CHANGE UP (ref 3.5–5.3)
POTASSIUM SERPL-MCNC: 3.8 MMOL/L — SIGNIFICANT CHANGE UP (ref 3.5–5.3)
POTASSIUM SERPL-MCNC: 3.8 MMOL/L — SIGNIFICANT CHANGE UP (ref 3.5–5.3)
POTASSIUM SERPL-MCNC: 3.9 MMOL/L — SIGNIFICANT CHANGE UP (ref 3.5–5.3)
POTASSIUM SERPL-MCNC: 4 MMOL/L — SIGNIFICANT CHANGE UP (ref 3.5–5.3)
POTASSIUM SERPL-MCNC: 4.2 MMOL/L — SIGNIFICANT CHANGE UP (ref 3.5–5.3)
POTASSIUM SERPL-MCNC: 4.2 MMOL/L — SIGNIFICANT CHANGE UP (ref 3.5–5.3)
POTASSIUM SERPL-MCNC: 4.3 MMOL/L — SIGNIFICANT CHANGE UP (ref 3.5–5.3)
POTASSIUM SERPL-MCNC: 4.4 MMOL/L — SIGNIFICANT CHANGE UP (ref 3.5–5.3)
POTASSIUM SERPL-MCNC: 4.8 MMOL/L — SIGNIFICANT CHANGE UP (ref 3.5–5.3)
POTASSIUM SERPL-MCNC: 5.2 MMOL/L — SIGNIFICANT CHANGE UP (ref 3.5–5.3)
POTASSIUM SERPL-MCNC: 5.7 MMOL/L — HIGH (ref 3.5–5.3)
POTASSIUM SERPL-MCNC: 5.9 MMOL/L — HIGH (ref 3.5–5.3)
POTASSIUM SERPL-MCNC: 6.2 MMOL/L — CRITICAL HIGH (ref 3.5–5.3)
POTASSIUM SERPL-SCNC: 3.5 MMOL/L — SIGNIFICANT CHANGE UP (ref 3.5–5.3)
POTASSIUM SERPL-SCNC: 3.6 MMOL/L — SIGNIFICANT CHANGE UP (ref 3.5–5.3)
POTASSIUM SERPL-SCNC: 3.7 MMOL/L — SIGNIFICANT CHANGE UP (ref 3.5–5.3)
POTASSIUM SERPL-SCNC: 3.8 MMOL/L — SIGNIFICANT CHANGE UP (ref 3.5–5.3)
POTASSIUM SERPL-SCNC: 3.8 MMOL/L — SIGNIFICANT CHANGE UP (ref 3.5–5.3)
POTASSIUM SERPL-SCNC: 3.9 MMOL/L — SIGNIFICANT CHANGE UP (ref 3.5–5.3)
POTASSIUM SERPL-SCNC: 4 MMOL/L — SIGNIFICANT CHANGE UP (ref 3.5–5.3)
POTASSIUM SERPL-SCNC: 4.2 MMOL/L — SIGNIFICANT CHANGE UP (ref 3.5–5.3)
POTASSIUM SERPL-SCNC: 4.2 MMOL/L — SIGNIFICANT CHANGE UP (ref 3.5–5.3)
POTASSIUM SERPL-SCNC: 4.3 MMOL/L — SIGNIFICANT CHANGE UP (ref 3.5–5.3)
POTASSIUM SERPL-SCNC: 4.4 MMOL/L — SIGNIFICANT CHANGE UP (ref 3.5–5.3)
POTASSIUM SERPL-SCNC: 4.5 MMOL/L
POTASSIUM SERPL-SCNC: 4.8 MMOL/L — SIGNIFICANT CHANGE UP (ref 3.5–5.3)
POTASSIUM SERPL-SCNC: 5.2 MMOL/L — SIGNIFICANT CHANGE UP (ref 3.5–5.3)
POTASSIUM SERPL-SCNC: 5.7 MMOL/L — HIGH (ref 3.5–5.3)
POTASSIUM SERPL-SCNC: 5.9 MMOL/L — HIGH (ref 3.5–5.3)
POTASSIUM SERPL-SCNC: 6.2 MMOL/L — CRITICAL HIGH (ref 3.5–5.3)
PROCALCITONIN SERPL-MCNC: 0.29 NG/ML — HIGH (ref 0.02–0.1)
PROCALCITONIN SERPL-MCNC: 0.54 NG/ML — HIGH (ref 0.02–0.1)
PROCALCITONIN SERPL-MCNC: 0.78 NG/ML — HIGH (ref 0.02–0.1)
PROCALCITONIN SERPL-MCNC: 15.57 NG/ML — HIGH (ref 0.02–0.1)
PROCALCITONIN SERPL-MCNC: 15.89 NG/ML — HIGH (ref 0.02–0.1)
PROCALCITONIN SERPL-MCNC: 19.62 NG/ML — HIGH (ref 0.02–0.1)
PROCALCITONIN SERPL-MCNC: 4.29 NG/ML — HIGH (ref 0.02–0.1)
PROCALCITONIN SERPL-MCNC: 5.07 NG/ML — HIGH (ref 0.02–0.1)
PROCALCITONIN SERPL-MCNC: 5.55 NG/ML — HIGH (ref 0.02–0.1)
PROCALCITONIN SERPL-MCNC: 8.56 NG/ML — HIGH (ref 0.02–0.1)
PROT SERPL-MCNC: 6.1 G/DL — SIGNIFICANT CHANGE UP (ref 6–8.3)
PROT SERPL-MCNC: 6.2 G/DL — SIGNIFICANT CHANGE UP (ref 6–8.3)
PROT SERPL-MCNC: 6.3 G/DL — SIGNIFICANT CHANGE UP (ref 6–8.3)
PROT SERPL-MCNC: 6.4 G/DL — SIGNIFICANT CHANGE UP (ref 6–8.3)
PROT SERPL-MCNC: 6.6 G/DL — SIGNIFICANT CHANGE UP (ref 6–8.3)
PROT SERPL-MCNC: 6.6 G/DL — SIGNIFICANT CHANGE UP (ref 6–8.3)
PROT SERPL-MCNC: 6.7 G/DL — SIGNIFICANT CHANGE UP (ref 6–8.3)
PROT SERPL-MCNC: 7 G/DL — SIGNIFICANT CHANGE UP (ref 6–8.3)
PROT SERPL-MCNC: 7 G/DL — SIGNIFICANT CHANGE UP (ref 6–8.3)
PROT SERPL-MCNC: 7.1 G/DL
PROT SERPL-MCNC: 7.5 G/DL — SIGNIFICANT CHANGE UP (ref 6–8.3)
PROT UR-MCNC: ABNORMAL
PROTHROM AB SERPL-ACNC: 12.5 SEC — SIGNIFICANT CHANGE UP (ref 10–12.9)
PROTHROM AB SERPL-ACNC: 12.9 SEC — SIGNIFICANT CHANGE UP (ref 10–12.9)
PROTHROM AB SERPL-ACNC: 13 SEC — SIGNIFICANT CHANGE UP (ref 10–13.1)
PROTHROM AB SERPL-ACNC: 13.7 SEC — HIGH (ref 10–12.9)
PROTHROM AB SERPL-ACNC: 14.3 SEC — HIGH (ref 10–12.9)
RBC # BLD: 3.03 M/UL — LOW (ref 3.8–5.2)
RBC # BLD: 3.48 M/UL — LOW (ref 3.8–5.2)
RBC # BLD: 3.5 M/UL — LOW (ref 3.8–5.2)
RBC # BLD: 3.61 M/UL — LOW (ref 3.8–5.2)
RBC # BLD: 3.69 M/UL — LOW (ref 3.8–5.2)
RBC # BLD: 3.71 M/UL — LOW (ref 3.8–5.2)
RBC # BLD: 3.89 M/UL — SIGNIFICANT CHANGE UP (ref 3.8–5.2)
RBC # BLD: 3.93 M/UL — SIGNIFICANT CHANGE UP (ref 3.8–5.2)
RBC # BLD: 3.99 M/UL — SIGNIFICANT CHANGE UP (ref 3.8–5.2)
RBC # BLD: 4.1 M/UL — SIGNIFICANT CHANGE UP (ref 3.8–5.2)
RBC # BLD: 4.36 M/UL — SIGNIFICANT CHANGE UP (ref 3.8–5.2)
RBC # BLD: 4.56 M/UL
RBC # BLD: 4.68 M/UL — SIGNIFICANT CHANGE UP (ref 3.8–5.2)
RBC # BLD: 4.73 M/UL — SIGNIFICANT CHANGE UP (ref 3.8–5.2)
RBC # FLD: 12.5 % — SIGNIFICANT CHANGE UP (ref 10.3–14.5)
RBC # FLD: 12.7 % — SIGNIFICANT CHANGE UP (ref 10.3–14.5)
RBC # FLD: 12.7 % — SIGNIFICANT CHANGE UP (ref 10.3–14.5)
RBC # FLD: 12.8 %
RBC # FLD: 12.9 % — SIGNIFICANT CHANGE UP (ref 10.3–14.5)
RBC # FLD: 13.4 % — SIGNIFICANT CHANGE UP (ref 10.3–14.5)
RBC # FLD: 14 % — SIGNIFICANT CHANGE UP (ref 10.3–14.5)
RBC # FLD: 14.1 % — SIGNIFICANT CHANGE UP (ref 10.3–14.5)
RBC # FLD: 14.2 % — SIGNIFICANT CHANGE UP (ref 10.3–14.5)
RBC # FLD: 14.2 % — SIGNIFICANT CHANGE UP (ref 10.3–14.5)
RBC # FLD: 14.5 % — SIGNIFICANT CHANGE UP (ref 10.3–14.5)
RBC # FLD: 17.3 % — HIGH (ref 10.3–14.5)
RBC CASTS # UR COMP ASSIST: 43 /HPF — HIGH (ref 0–4)
SAO2 % BLDA: 93 % — SIGNIFICANT CHANGE UP (ref 92–96)
SAO2 % BLDA: 98 % — HIGH (ref 92–96)
SAO2 % BLDA: 99 % — HIGH (ref 92–96)
SARS-COV-2 RNA SPEC QL NAA+PROBE: DETECTED
SODIUM SERPL-SCNC: 131 MMOL/L — LOW (ref 135–145)
SODIUM SERPL-SCNC: 132 MMOL/L — LOW (ref 135–145)
SODIUM SERPL-SCNC: 132 MMOL/L — LOW (ref 135–145)
SODIUM SERPL-SCNC: 133 MMOL/L — LOW (ref 135–145)
SODIUM SERPL-SCNC: 134 MMOL/L — LOW (ref 135–145)
SODIUM SERPL-SCNC: 135 MMOL/L — SIGNIFICANT CHANGE UP (ref 135–145)
SODIUM SERPL-SCNC: 136 MMOL/L — SIGNIFICANT CHANGE UP (ref 135–145)
SODIUM SERPL-SCNC: 136 MMOL/L — SIGNIFICANT CHANGE UP (ref 135–145)
SODIUM SERPL-SCNC: 137 MMOL/L — SIGNIFICANT CHANGE UP (ref 135–145)
SODIUM SERPL-SCNC: 138 MMOL/L — SIGNIFICANT CHANGE UP (ref 135–145)
SODIUM SERPL-SCNC: 138 MMOL/L — SIGNIFICANT CHANGE UP (ref 135–145)
SODIUM SERPL-SCNC: 143 MMOL/L
SP GR SPEC: 1.03 — HIGH (ref 1.01–1.02)
SPECIMEN SOURCE: SIGNIFICANT CHANGE UP
T-CELL CD4 SUBSET PNL BLD: 117 /UL — LOW (ref 325–1251)
T-CELL CD4 SUBSET PNL BLD: 297 /UL — LOW (ref 325–1251)
T-CELL CD4 SUBSET PNL BLD: 325 /UL — SIGNIFICANT CHANGE UP (ref 325–1251)
T-CELL CD4 SUBSET PNL BLD: 334 /UL — SIGNIFICANT CHANGE UP (ref 325–1251)
T3RU NFR SERPL: 1 TBI
T4 SERPL-MCNC: 9.8 UG/DL
TRIGL SERPL-MCNC: 145 MG/DL
TRIGL SERPL-MCNC: 323 MG/DL — HIGH (ref 10–149)
TRIGL SERPL-MCNC: 361 MG/DL — HIGH (ref 10–149)
TRIGL SERPL-MCNC: 652 MG/DL — HIGH (ref 10–149)
TROPONIN T, HIGH SENSITIVITY RESULT: 44 NG/L — SIGNIFICANT CHANGE UP (ref 0–51)
TROPONIN T, HIGH SENSITIVITY RESULT: 56 NG/L — HIGH (ref 0–51)
TROPONIN T, HIGH SENSITIVITY RESULT: 59 NG/L — HIGH (ref 0–51)
TROPONIN T, HIGH SENSITIVITY RESULT: 86 NG/L — HIGH (ref 0–51)
TSH SERPL-ACNC: 3.54 UIU/ML
TSH SERPL-MCNC: 1.65 UIU/ML — SIGNIFICANT CHANGE UP (ref 0.27–4.2)
UROBILINOGEN FLD QL: NEGATIVE — SIGNIFICANT CHANGE UP
WBC # BLD: 12.26 K/UL — HIGH (ref 3.8–10.5)
WBC # BLD: 12.48 K/UL — HIGH (ref 3.8–10.5)
WBC # BLD: 19.54 K/UL — HIGH (ref 3.8–10.5)
WBC # BLD: 3.53 K/UL — LOW (ref 3.8–10.5)
WBC # BLD: 3.64 K/UL — LOW (ref 3.8–10.5)
WBC # BLD: 4.31 K/UL — SIGNIFICANT CHANGE UP (ref 3.8–10.5)
WBC # BLD: 4.33 K/UL — SIGNIFICANT CHANGE UP (ref 3.8–10.5)
WBC # BLD: 5.51 K/UL — SIGNIFICANT CHANGE UP (ref 3.8–10.5)
WBC # BLD: 7.02 K/UL — SIGNIFICANT CHANGE UP (ref 3.8–10.5)
WBC # BLD: 7.13 K/UL — SIGNIFICANT CHANGE UP (ref 3.8–10.5)
WBC # BLD: 8.12 K/UL — SIGNIFICANT CHANGE UP (ref 3.8–10.5)
WBC # BLD: 8.68 K/UL — SIGNIFICANT CHANGE UP (ref 3.8–10.5)
WBC # BLD: 8.84 K/UL — SIGNIFICANT CHANGE UP (ref 3.8–10.5)
WBC # FLD AUTO: 12.26 K/UL — HIGH (ref 3.8–10.5)
WBC # FLD AUTO: 12.48 K/UL — HIGH (ref 3.8–10.5)
WBC # FLD AUTO: 19.54 K/UL — HIGH (ref 3.8–10.5)
WBC # FLD AUTO: 3.53 K/UL — LOW (ref 3.8–10.5)
WBC # FLD AUTO: 3.64 K/UL — LOW (ref 3.8–10.5)
WBC # FLD AUTO: 4.31 K/UL — SIGNIFICANT CHANGE UP (ref 3.8–10.5)
WBC # FLD AUTO: 4.33 K/UL — SIGNIFICANT CHANGE UP (ref 3.8–10.5)
WBC # FLD AUTO: 5.51 K/UL — SIGNIFICANT CHANGE UP (ref 3.8–10.5)
WBC # FLD AUTO: 5.54 K/UL
WBC # FLD AUTO: 7.02 K/UL — SIGNIFICANT CHANGE UP (ref 3.8–10.5)
WBC # FLD AUTO: 7.13 K/UL — SIGNIFICANT CHANGE UP (ref 3.8–10.5)
WBC # FLD AUTO: 8.12 K/UL — SIGNIFICANT CHANGE UP (ref 3.8–10.5)
WBC # FLD AUTO: 8.68 K/UL — SIGNIFICANT CHANGE UP (ref 3.8–10.5)
WBC # FLD AUTO: 8.84 K/UL — SIGNIFICANT CHANGE UP (ref 3.8–10.5)
WBC UR QL: 74 /HPF — HIGH (ref 0–5)

## 2020-01-01 PROCEDURE — 93000 ELECTROCARDIOGRAM COMPLETE: CPT

## 2020-01-01 PROCEDURE — 99223 1ST HOSP IP/OBS HIGH 75: CPT | Mod: GC

## 2020-01-01 PROCEDURE — 99291 CRITICAL CARE FIRST HOUR: CPT

## 2020-01-01 PROCEDURE — 71045 X-RAY EXAM CHEST 1 VIEW: CPT | Mod: 26

## 2020-01-01 PROCEDURE — 99285 EMERGENCY DEPT VISIT HI MDM: CPT | Mod: GC

## 2020-01-01 PROCEDURE — 99291 CRITICAL CARE FIRST HOUR: CPT | Mod: 25

## 2020-01-01 PROCEDURE — 93010 ELECTROCARDIOGRAM REPORT: CPT

## 2020-01-01 PROCEDURE — 99233 SBSQ HOSP IP/OBS HIGH 50: CPT

## 2020-01-01 PROCEDURE — 36620 INSERTION CATHETER ARTERY: CPT

## 2020-01-01 PROCEDURE — 90945 DIALYSIS ONE EVALUATION: CPT | Mod: CS,GC

## 2020-01-01 PROCEDURE — 99214 OFFICE O/P EST MOD 30 MIN: CPT

## 2020-01-01 PROCEDURE — 99223 1ST HOSP IP/OBS HIGH 75: CPT

## 2020-01-01 PROCEDURE — 36556 INSERT NON-TUNNEL CV CATH: CPT

## 2020-01-01 RX ORDER — HYDROXYCHLOROQUINE SULFATE 200 MG
200 TABLET ORAL EVERY 12 HOURS
Refills: 0 | Status: DISCONTINUED | OUTPATIENT
Start: 2020-01-01 | End: 2020-01-01

## 2020-01-01 RX ORDER — HEPARIN SODIUM 5000 [USP'U]/ML
500 INJECTION INTRAVENOUS; SUBCUTANEOUS
Qty: 10000 | Refills: 0 | Status: DISCONTINUED | OUTPATIENT
Start: 2020-01-01 | End: 2020-01-01

## 2020-01-01 RX ORDER — DEXTROSE 50 % IN WATER 50 %
15 SYRINGE (ML) INTRAVENOUS ONCE
Refills: 0 | Status: DISCONTINUED | OUTPATIENT
Start: 2020-01-01 | End: 2020-01-01

## 2020-01-01 RX ORDER — HYDROXYCHLOROQUINE SULFATE 200 MG
400 TABLET ORAL EVERY 12 HOURS
Refills: 0 | Status: COMPLETED | OUTPATIENT
Start: 2020-01-01 | End: 2020-01-01

## 2020-01-01 RX ORDER — OXYCODONE HYDROCHLORIDE 5 MG/1
5 TABLET ORAL EVERY 4 HOURS
Refills: 0 | Status: DISCONTINUED | OUTPATIENT
Start: 2020-01-01 | End: 2020-01-01

## 2020-01-01 RX ORDER — SODIUM BICARBONATE 1 MEQ/ML
0.1 SYRINGE (ML) INTRAVENOUS
Qty: 150 | Refills: 0 | Status: DISCONTINUED | OUTPATIENT
Start: 2020-01-01 | End: 2020-01-01

## 2020-01-01 RX ORDER — INFLUENZA VIRUS VACCINE 15; 15; 15; 15 UG/.5ML; UG/.5ML; UG/.5ML; UG/.5ML
0.5 SUSPENSION INTRAMUSCULAR ONCE
Refills: 0 | Status: DISCONTINUED | OUTPATIENT
Start: 2020-01-01 | End: 2020-01-01

## 2020-01-01 RX ORDER — FAMOTIDINE 10 MG/ML
20 INJECTION INTRAVENOUS
Refills: 0 | Status: DISCONTINUED | OUTPATIENT
Start: 2020-01-01 | End: 2020-01-01

## 2020-01-01 RX ORDER — AMLODIPINE BESYLATE 2.5 MG/1
1 TABLET ORAL
Qty: 0 | Refills: 0 | DISCHARGE

## 2020-01-01 RX ORDER — POTASSIUM PHOSPHATE, MONOBASIC POTASSIUM PHOSPHATE, DIBASIC 236; 224 MG/ML; MG/ML
30 INJECTION, SOLUTION INTRAVENOUS ONCE
Refills: 0 | Status: COMPLETED | OUTPATIENT
Start: 2020-01-01 | End: 2020-01-01

## 2020-01-01 RX ORDER — ACETAMINOPHEN 500 MG
650 TABLET ORAL EVERY 6 HOURS
Refills: 0 | Status: DISCONTINUED | OUTPATIENT
Start: 2020-01-01 | End: 2020-01-01

## 2020-01-01 RX ORDER — SODIUM CHLORIDE 9 MG/ML
1000 INJECTION, SOLUTION INTRAVENOUS
Refills: 0 | Status: DISCONTINUED | OUTPATIENT
Start: 2020-01-01 | End: 2020-01-01

## 2020-01-01 RX ORDER — FUROSEMIDE 40 MG
40 TABLET ORAL ONCE
Refills: 0 | Status: COMPLETED | OUTPATIENT
Start: 2020-01-01 | End: 2020-01-01

## 2020-01-01 RX ORDER — BUMETANIDE 0.25 MG/ML
4 INJECTION INTRAMUSCULAR; INTRAVENOUS
Qty: 20 | Refills: 0 | Status: DISCONTINUED | OUTPATIENT
Start: 2020-01-01 | End: 2020-01-01

## 2020-01-01 RX ORDER — INSULIN LISPRO 100/ML
VIAL (ML) SUBCUTANEOUS
Refills: 0 | Status: DISCONTINUED | OUTPATIENT
Start: 2020-01-01 | End: 2020-01-01

## 2020-01-01 RX ORDER — INSULIN HUMAN 100 [IU]/ML
10 INJECTION, SOLUTION SUBCUTANEOUS ONCE
Refills: 0 | Status: COMPLETED | OUTPATIENT
Start: 2020-01-01 | End: 2020-01-01

## 2020-01-01 RX ORDER — LEVOTHYROXINE SODIUM 125 MCG
100 TABLET ORAL DAILY
Refills: 0 | Status: DISCONTINUED | OUTPATIENT
Start: 2020-01-01 | End: 2020-01-01

## 2020-01-01 RX ORDER — BUMETANIDE 0.25 MG/ML
2 INJECTION INTRAMUSCULAR; INTRAVENOUS ONCE
Refills: 0 | Status: COMPLETED | OUTPATIENT
Start: 2020-01-01 | End: 2020-01-01

## 2020-01-01 RX ORDER — SODIUM ZIRCONIUM CYCLOSILICATE 10 G/10G
10 POWDER, FOR SUSPENSION ORAL ONCE
Refills: 0 | Status: COMPLETED | OUTPATIENT
Start: 2020-01-01 | End: 2020-01-01

## 2020-01-01 RX ORDER — DOPAMINE HYDROCHLORIDE 40 MG/ML
2.5 INJECTION, SOLUTION, CONCENTRATE INTRAVENOUS
Qty: 400 | Refills: 0 | Status: DISCONTINUED | OUTPATIENT
Start: 2020-01-01 | End: 2020-01-01

## 2020-01-01 RX ORDER — DEXTROSE 50 % IN WATER 50 %
50 SYRINGE (ML) INTRAVENOUS ONCE
Refills: 0 | Status: COMPLETED | OUTPATIENT
Start: 2020-01-01 | End: 2020-01-01

## 2020-01-01 RX ORDER — POTASSIUM CHLORIDE 20 MEQ
10 PACKET (EA) ORAL
Refills: 0 | Status: COMPLETED | OUTPATIENT
Start: 2020-01-01 | End: 2020-01-01

## 2020-01-01 RX ORDER — SITAGLIPTIN 50 MG/1
1 TABLET, FILM COATED ORAL
Qty: 0 | Refills: 0 | DISCHARGE

## 2020-01-01 RX ORDER — VASOPRESSIN 20 [USP'U]/ML
0.04 INJECTION INTRAVENOUS
Qty: 50 | Refills: 0 | Status: DISCONTINUED | OUTPATIENT
Start: 2020-01-01 | End: 2020-01-01

## 2020-01-01 RX ORDER — LOSARTAN POTASSIUM 100 MG/1
1 TABLET, FILM COATED ORAL
Qty: 0 | Refills: 0 | DISCHARGE

## 2020-01-01 RX ORDER — MIDAZOLAM HYDROCHLORIDE 1 MG/ML
2 INJECTION, SOLUTION INTRAMUSCULAR; INTRAVENOUS ONCE
Refills: 0 | Status: DISCONTINUED | OUTPATIENT
Start: 2020-01-01 | End: 2020-01-01

## 2020-01-01 RX ORDER — CISATRACURIUM BESYLATE 2 MG/ML
10 INJECTION INTRAVENOUS ONCE
Refills: 0 | Status: COMPLETED | OUTPATIENT
Start: 2020-01-01 | End: 2020-01-01

## 2020-01-01 RX ORDER — DEXTROSE 50 % IN WATER 50 %
25 SYRINGE (ML) INTRAVENOUS ONCE
Refills: 0 | Status: DISCONTINUED | OUTPATIENT
Start: 2020-01-01 | End: 2020-01-01

## 2020-01-01 RX ORDER — CALCIUM CHLORIDE
1000 POWDER (GRAM) MISCELLANEOUS ONCE
Refills: 0 | Status: COMPLETED | OUTPATIENT
Start: 2020-01-01 | End: 2020-01-01

## 2020-01-01 RX ORDER — HYDROMORPHONE HYDROCHLORIDE 2 MG/ML
1 INJECTION INTRAMUSCULAR; INTRAVENOUS; SUBCUTANEOUS ONCE
Refills: 0 | Status: DISCONTINUED | OUTPATIENT
Start: 2020-01-01 | End: 2020-01-01

## 2020-01-01 RX ORDER — CALCIUM GLUCONATE 100 MG/ML
2 VIAL (ML) INTRAVENOUS ONCE
Refills: 0 | Status: COMPLETED | OUTPATIENT
Start: 2020-01-01 | End: 2020-01-01

## 2020-01-01 RX ORDER — HYDROMORPHONE HYDROCHLORIDE 2 MG/ML
2 INJECTION INTRAMUSCULAR; INTRAVENOUS; SUBCUTANEOUS
Qty: 100 | Refills: 0 | Status: DISCONTINUED | OUTPATIENT
Start: 2020-01-01 | End: 2020-01-01

## 2020-01-01 RX ORDER — FUROSEMIDE 40 MG
20 TABLET ORAL ONCE
Refills: 0 | Status: COMPLETED | OUTPATIENT
Start: 2020-01-01 | End: 2020-01-01

## 2020-01-01 RX ORDER — HYDROXYCHLOROQUINE SULFATE 200 MG
200 TABLET ORAL
Refills: 0 | Status: DISCONTINUED | OUTPATIENT
Start: 2020-01-01 | End: 2020-01-01

## 2020-01-01 RX ORDER — ENOXAPARIN SODIUM 100 MG/ML
40 INJECTION SUBCUTANEOUS DAILY
Refills: 0 | Status: DISCONTINUED | OUTPATIENT
Start: 2020-01-01 | End: 2020-01-01

## 2020-01-01 RX ORDER — ROCURONIUM BROMIDE 10 MG/ML
50 VIAL (ML) INTRAVENOUS ONCE
Refills: 0 | Status: COMPLETED | OUTPATIENT
Start: 2020-01-01 | End: 2020-01-01

## 2020-01-01 RX ORDER — PROPOFOL 10 MG/ML
20 INJECTION, EMULSION INTRAVENOUS
Qty: 1000 | Refills: 0 | Status: DISCONTINUED | OUTPATIENT
Start: 2020-01-01 | End: 2020-01-01

## 2020-01-01 RX ORDER — HEPARIN SODIUM 5000 [USP'U]/ML
5000 INJECTION INTRAVENOUS; SUBCUTANEOUS EVERY 8 HOURS
Refills: 0 | Status: DISCONTINUED | OUTPATIENT
Start: 2020-01-01 | End: 2020-01-01

## 2020-01-01 RX ORDER — FAMOTIDINE 10 MG/ML
20 INJECTION INTRAVENOUS DAILY
Refills: 0 | Status: DISCONTINUED | OUTPATIENT
Start: 2020-01-01 | End: 2020-01-01

## 2020-01-01 RX ORDER — INSULIN HUMAN 100 [IU]/ML
10 INJECTION, SOLUTION SUBCUTANEOUS ONCE
Refills: 0 | Status: DISCONTINUED | OUTPATIENT
Start: 2020-01-01 | End: 2020-01-01

## 2020-01-01 RX ORDER — LEVOTHYROXINE SODIUM 125 MCG
50 TABLET ORAL AT BEDTIME
Refills: 0 | Status: DISCONTINUED | OUTPATIENT
Start: 2020-01-01 | End: 2020-01-01

## 2020-01-01 RX ORDER — MIDAZOLAM HYDROCHLORIDE 1 MG/ML
4 INJECTION, SOLUTION INTRAMUSCULAR; INTRAVENOUS ONCE
Refills: 0 | Status: DISCONTINUED | OUTPATIENT
Start: 2020-01-01 | End: 2020-01-01

## 2020-01-01 RX ORDER — CHLORHEXIDINE GLUCONATE 213 G/1000ML
15 SOLUTION TOPICAL EVERY 12 HOURS
Refills: 0 | Status: DISCONTINUED | OUTPATIENT
Start: 2020-01-01 | End: 2020-01-01

## 2020-01-01 RX ORDER — ROSUVASTATIN CALCIUM 5 MG/1
1 TABLET ORAL
Qty: 0 | Refills: 0 | DISCHARGE

## 2020-01-01 RX ORDER — ASCORBIC ACID 60 MG
500 TABLET,CHEWABLE ORAL DAILY
Refills: 0 | Status: DISCONTINUED | OUTPATIENT
Start: 2020-01-01 | End: 2020-01-01

## 2020-01-01 RX ORDER — FENTANYL CITRATE 50 UG/ML
0.5 INJECTION INTRAVENOUS
Qty: 5000 | Refills: 0 | Status: DISCONTINUED | OUTPATIENT
Start: 2020-01-01 | End: 2020-01-01

## 2020-01-01 RX ORDER — CHLORHEXIDINE GLUCONATE 213 G/1000ML
1 SOLUTION TOPICAL
Refills: 0 | Status: DISCONTINUED | OUTPATIENT
Start: 2020-01-01 | End: 2020-01-01

## 2020-01-01 RX ORDER — HYDROMORPHONE HYDROCHLORIDE 2 MG/ML
1 INJECTION INTRAMUSCULAR; INTRAVENOUS; SUBCUTANEOUS
Refills: 0 | Status: DISCONTINUED | OUTPATIENT
Start: 2020-01-01 | End: 2020-01-01

## 2020-01-01 RX ORDER — HYDROXYCHLOROQUINE SULFATE 200 MG
200 TABLET ORAL
Refills: 0 | Status: COMPLETED | OUTPATIENT
Start: 2020-01-01 | End: 2020-01-01

## 2020-01-01 RX ORDER — SODIUM CHLORIDE 9 MG/ML
1000 INJECTION INTRAMUSCULAR; INTRAVENOUS; SUBCUTANEOUS
Refills: 0 | Status: DISCONTINUED | OUTPATIENT
Start: 2020-01-01 | End: 2020-01-01

## 2020-01-01 RX ORDER — PROPOFOL 10 MG/ML
10 INJECTION, EMULSION INTRAVENOUS
Qty: 1000 | Refills: 0 | Status: DISCONTINUED | OUTPATIENT
Start: 2020-01-01 | End: 2020-01-01

## 2020-01-01 RX ORDER — INSULIN LISPRO 100/ML
VIAL (ML) SUBCUTANEOUS EVERY 6 HOURS
Refills: 0 | Status: DISCONTINUED | OUTPATIENT
Start: 2020-01-01 | End: 2020-01-01

## 2020-01-01 RX ORDER — HYDROXYCHLOROQUINE SULFATE 200 MG
TABLET ORAL
Refills: 0 | Status: DISCONTINUED | OUTPATIENT
Start: 2020-01-01 | End: 2020-01-01

## 2020-01-01 RX ORDER — PROPOFOL 10 MG/ML
35 INJECTION, EMULSION INTRAVENOUS
Qty: 1000 | Refills: 0 | Status: DISCONTINUED | OUTPATIENT
Start: 2020-01-01 | End: 2020-01-01

## 2020-01-01 RX ORDER — VANCOMYCIN HCL 1 G
1000 VIAL (EA) INTRAVENOUS EVERY 12 HOURS
Refills: 0 | Status: DISCONTINUED | OUTPATIENT
Start: 2020-01-01 | End: 2020-01-01

## 2020-01-01 RX ORDER — INSULIN LISPRO 100/ML
VIAL (ML) SUBCUTANEOUS EVERY 4 HOURS
Refills: 0 | Status: DISCONTINUED | OUTPATIENT
Start: 2020-01-01 | End: 2020-01-01

## 2020-01-01 RX ORDER — MAGNESIUM SULFATE 500 MG/ML
2 VIAL (ML) INJECTION ONCE
Refills: 0 | Status: COMPLETED | OUTPATIENT
Start: 2020-01-01 | End: 2020-01-01

## 2020-01-01 RX ORDER — NOREPINEPHRINE BITARTRATE/D5W 8 MG/250ML
0.08 PLASTIC BAG, INJECTION (ML) INTRAVENOUS
Qty: 16 | Refills: 0 | Status: DISCONTINUED | OUTPATIENT
Start: 2020-01-01 | End: 2020-01-01

## 2020-01-01 RX ORDER — AMLODIPINE BESYLATE 2.5 MG/1
10 TABLET ORAL DAILY
Refills: 0 | Status: DISCONTINUED | OUTPATIENT
Start: 2020-01-01 | End: 2020-01-01

## 2020-01-01 RX ORDER — CEFEPIME 1 G/1
2000 INJECTION, POWDER, FOR SOLUTION INTRAMUSCULAR; INTRAVENOUS EVERY 8 HOURS
Refills: 0 | Status: DISCONTINUED | OUTPATIENT
Start: 2020-01-01 | End: 2020-01-01

## 2020-01-01 RX ORDER — KETAMINE HYDROCHLORIDE 100 MG/ML
0.13 INJECTION INTRAMUSCULAR; INTRAVENOUS
Qty: 1000 | Refills: 0 | Status: DISCONTINUED | OUTPATIENT
Start: 2020-01-01 | End: 2020-01-01

## 2020-01-01 RX ORDER — CEFEPIME 1 G/1
2000 INJECTION, POWDER, FOR SOLUTION INTRAMUSCULAR; INTRAVENOUS EVERY 12 HOURS
Refills: 0 | Status: DISCONTINUED | OUTPATIENT
Start: 2020-01-01 | End: 2020-01-01

## 2020-01-01 RX ORDER — ALBUTEROL 90 UG/1
1 AEROSOL, METERED ORAL EVERY 6 HOURS
Refills: 0 | Status: DISCONTINUED | OUTPATIENT
Start: 2020-01-01 | End: 2020-01-01

## 2020-01-01 RX ORDER — LEVOTHYROXINE SODIUM 125 MCG
1 TABLET ORAL
Qty: 0 | Refills: 0 | DISCHARGE

## 2020-01-01 RX ORDER — DEXTROSE 50 % IN WATER 50 %
12.5 SYRINGE (ML) INTRAVENOUS ONCE
Refills: 0 | Status: DISCONTINUED | OUTPATIENT
Start: 2020-01-01 | End: 2020-01-01

## 2020-01-01 RX ORDER — SODIUM BICARBONATE 1 MEQ/ML
50 SYRINGE (ML) INTRAVENOUS ONCE
Refills: 0 | Status: COMPLETED | OUTPATIENT
Start: 2020-01-01 | End: 2020-01-01

## 2020-01-01 RX ORDER — GLUCAGON INJECTION, SOLUTION 0.5 MG/.1ML
1 INJECTION, SOLUTION SUBCUTANEOUS ONCE
Refills: 0 | Status: DISCONTINUED | OUTPATIENT
Start: 2020-01-01 | End: 2020-01-01

## 2020-01-01 RX ORDER — NOREPINEPHRINE BITARTRATE/D5W 8 MG/250ML
0.1 PLASTIC BAG, INJECTION (ML) INTRAVENOUS
Qty: 8 | Refills: 0 | Status: DISCONTINUED | OUTPATIENT
Start: 2020-01-01 | End: 2020-01-01

## 2020-01-01 RX ORDER — CALCIUM GLUCONATE 100 MG/ML
1 VIAL (ML) INTRAVENOUS ONCE
Refills: 0 | Status: COMPLETED | OUTPATIENT
Start: 2020-01-01 | End: 2020-01-01

## 2020-01-01 RX ORDER — ATORVASTATIN CALCIUM 80 MG/1
20 TABLET, FILM COATED ORAL AT BEDTIME
Refills: 0 | Status: DISCONTINUED | OUTPATIENT
Start: 2020-01-01 | End: 2020-01-01

## 2020-01-01 RX ORDER — METFORMIN HYDROCHLORIDE 850 MG/1
2 TABLET ORAL
Qty: 0 | Refills: 0 | DISCHARGE

## 2020-01-01 RX ORDER — ENOXAPARIN SODIUM 100 MG/ML
30 INJECTION SUBCUTANEOUS DAILY
Refills: 0 | Status: DISCONTINUED | OUTPATIENT
Start: 2020-01-01 | End: 2020-01-01

## 2020-01-01 RX ORDER — CISATRACURIUM BESYLATE 2 MG/ML
1 INJECTION INTRAVENOUS
Qty: 200 | Refills: 0 | Status: DISCONTINUED | OUTPATIENT
Start: 2020-01-01 | End: 2020-01-01

## 2020-01-01 RX ORDER — MIDAZOLAM HYDROCHLORIDE 1 MG/ML
4 INJECTION, SOLUTION INTRAMUSCULAR; INTRAVENOUS ONCE
Refills: 0 | Status: COMPLETED | OUTPATIENT
Start: 2020-01-01 | End: 2020-01-01

## 2020-01-01 RX ORDER — ROSUVASTATIN CALCIUM 5 MG/1
5 TABLET, FILM COATED ORAL
Qty: 90 | Refills: 3 | Status: ACTIVE | COMMUNITY
Start: 2018-05-31 | End: 1900-01-01

## 2020-01-01 RX ORDER — DEXTROSE 50 % IN WATER 50 %
50 SYRINGE (ML) INTRAVENOUS ONCE
Refills: 0 | Status: DISCONTINUED | OUTPATIENT
Start: 2020-01-01 | End: 2020-01-01

## 2020-01-01 RX ORDER — DEXMEDETOMIDINE HYDROCHLORIDE IN 0.9% SODIUM CHLORIDE 4 UG/ML
0.8 INJECTION INTRAVENOUS
Qty: 400 | Refills: 0 | Status: DISCONTINUED | OUTPATIENT
Start: 2020-01-01 | End: 2020-01-01

## 2020-01-01 RX ORDER — KETAMINE HYDROCHLORIDE 100 MG/ML
0.5 INJECTION INTRAMUSCULAR; INTRAVENOUS
Qty: 1000 | Refills: 0 | Status: DISCONTINUED | OUTPATIENT
Start: 2020-01-01 | End: 2020-01-01

## 2020-01-01 RX ADMIN — HYDROMORPHONE HYDROCHLORIDE 1 MILLIGRAM(S): 2 INJECTION INTRAMUSCULAR; INTRAVENOUS; SUBCUTANEOUS at 11:46

## 2020-01-01 RX ADMIN — Medication 1 APPLICATION(S): at 16:32

## 2020-01-01 RX ADMIN — Medication 50 MILLILITER(S): at 16:22

## 2020-01-01 RX ADMIN — Medication 14.9 MICROGRAM(S)/KG/MIN: at 11:29

## 2020-01-01 RX ADMIN — BUMETANIDE 2 MILLIGRAM(S): 0.25 INJECTION INTRAMUSCULAR; INTRAVENOUS at 12:25

## 2020-01-01 RX ADMIN — Medication 1000 MILLIGRAM(S): at 21:51

## 2020-01-01 RX ADMIN — Medication 100 MILLIEQUIVALENT(S): at 05:20

## 2020-01-01 RX ADMIN — ENOXAPARIN SODIUM 40 MILLIGRAM(S): 100 INJECTION SUBCUTANEOUS at 19:41

## 2020-01-01 RX ADMIN — Medication 1 APPLICATION(S): at 17:12

## 2020-01-01 RX ADMIN — INSULIN HUMAN 10 UNIT(S): 100 INJECTION, SOLUTION SUBCUTANEOUS at 21:50

## 2020-01-01 RX ADMIN — MIDAZOLAM HYDROCHLORIDE 2 MILLIGRAM(S): 1 INJECTION, SOLUTION INTRAMUSCULAR; INTRAVENOUS at 22:24

## 2020-01-01 RX ADMIN — PROPOFOL 16.7 MICROGRAM(S)/KG/MIN: 10 INJECTION, EMULSION INTRAVENOUS at 05:20

## 2020-01-01 RX ADMIN — Medication 200 MILLIGRAM(S): at 06:05

## 2020-01-01 RX ADMIN — CHLORHEXIDINE GLUCONATE 15 MILLILITER(S): 213 SOLUTION TOPICAL at 05:03

## 2020-01-01 RX ADMIN — FAMOTIDINE 20 MILLIGRAM(S): 10 INJECTION INTRAVENOUS at 06:29

## 2020-01-01 RX ADMIN — MIDAZOLAM HYDROCHLORIDE 4 MILLIGRAM(S): 1 INJECTION, SOLUTION INTRAMUSCULAR; INTRAVENOUS at 00:31

## 2020-01-01 RX ADMIN — PROPOFOL 14.3 MICROGRAM(S)/KG/MIN: 10 INJECTION, EMULSION INTRAVENOUS at 22:58

## 2020-01-01 RX ADMIN — Medication 2: at 04:26

## 2020-01-01 RX ADMIN — OXYCODONE HYDROCHLORIDE 5 MILLIGRAM(S): 5 TABLET ORAL at 04:25

## 2020-01-01 RX ADMIN — Medication 650 MILLIGRAM(S): at 04:47

## 2020-01-01 RX ADMIN — Medication 500 MILLIGRAM(S): at 11:47

## 2020-01-01 RX ADMIN — Medication 650 MILLIGRAM(S): at 23:26

## 2020-01-01 RX ADMIN — Medication 650 MILLIGRAM(S): at 04:45

## 2020-01-01 RX ADMIN — Medication 200 GRAM(S): at 14:30

## 2020-01-01 RX ADMIN — FAMOTIDINE 20 MILLIGRAM(S): 10 INJECTION INTRAVENOUS at 17:11

## 2020-01-01 RX ADMIN — CHLORHEXIDINE GLUCONATE 15 MILLILITER(S): 213 SOLUTION TOPICAL at 18:10

## 2020-01-01 RX ADMIN — Medication 50 MICROGRAM(S): at 21:10

## 2020-01-01 RX ADMIN — CEFEPIME 100 MILLIGRAM(S): 1 INJECTION, POWDER, FOR SOLUTION INTRAMUSCULAR; INTRAVENOUS at 17:26

## 2020-01-01 RX ADMIN — PROPOFOL 9.52 MICROGRAM(S)/KG/MIN: 10 INJECTION, EMULSION INTRAVENOUS at 16:32

## 2020-01-01 RX ADMIN — Medication 14.9 MICROGRAM(S)/KG/MIN: at 00:36

## 2020-01-01 RX ADMIN — ATORVASTATIN CALCIUM 20 MILLIGRAM(S): 80 TABLET, FILM COATED ORAL at 21:46

## 2020-01-01 RX ADMIN — HEPARIN SODIUM 5000 UNIT(S): 5000 INJECTION INTRAVENOUS; SUBCUTANEOUS at 21:46

## 2020-01-01 RX ADMIN — Medication 1 APPLICATION(S): at 04:26

## 2020-01-01 RX ADMIN — HEPARIN SODIUM 5000 UNIT(S): 5000 INJECTION INTRAVENOUS; SUBCUTANEOUS at 05:03

## 2020-01-01 RX ADMIN — Medication 40 MILLIGRAM(S): at 17:25

## 2020-01-01 RX ADMIN — Medication 2: at 17:12

## 2020-01-01 RX ADMIN — Medication 4: at 17:16

## 2020-01-01 RX ADMIN — Medication 1 APPLICATION(S): at 17:20

## 2020-01-01 RX ADMIN — ATORVASTATIN CALCIUM 20 MILLIGRAM(S): 80 TABLET, FILM COATED ORAL at 22:57

## 2020-01-01 RX ADMIN — Medication 50 MICROGRAM(S): at 22:57

## 2020-01-01 RX ADMIN — CHLORHEXIDINE GLUCONATE 15 MILLILITER(S): 213 SOLUTION TOPICAL at 18:03

## 2020-01-01 RX ADMIN — HEPARIN SODIUM 5000 UNIT(S): 5000 INJECTION INTRAVENOUS; SUBCUTANEOUS at 05:09

## 2020-01-01 RX ADMIN — CEFEPIME 100 MILLIGRAM(S): 1 INJECTION, POWDER, FOR SOLUTION INTRAMUSCULAR; INTRAVENOUS at 21:43

## 2020-01-01 RX ADMIN — Medication 2 MILLIGRAM(S): at 17:25

## 2020-01-01 RX ADMIN — CEFEPIME 100 MILLIGRAM(S): 1 INJECTION, POWDER, FOR SOLUTION INTRAMUSCULAR; INTRAVENOUS at 21:18

## 2020-01-01 RX ADMIN — FAMOTIDINE 20 MILLIGRAM(S): 10 INJECTION INTRAVENOUS at 05:21

## 2020-01-01 RX ADMIN — CEFEPIME 100 MILLIGRAM(S): 1 INJECTION, POWDER, FOR SOLUTION INTRAMUSCULAR; INTRAVENOUS at 04:47

## 2020-01-01 RX ADMIN — Medication 50 MILLILITER(S): at 21:51

## 2020-01-01 RX ADMIN — Medication 2: at 05:51

## 2020-01-01 RX ADMIN — Medication 50 GRAM(S): at 03:11

## 2020-01-01 RX ADMIN — Medication 200 MILLIGRAM(S): at 06:29

## 2020-01-01 RX ADMIN — Medication 650 MILLIGRAM(S): at 20:00

## 2020-01-01 RX ADMIN — CEFEPIME 100 MILLIGRAM(S): 1 INJECTION, POWDER, FOR SOLUTION INTRAMUSCULAR; INTRAVENOUS at 14:01

## 2020-01-01 RX ADMIN — Medication 100 MILLIEQUIVALENT(S): at 04:30

## 2020-01-01 RX ADMIN — CHLORHEXIDINE GLUCONATE 15 MILLILITER(S): 213 SOLUTION TOPICAL at 04:25

## 2020-01-01 RX ADMIN — OXYCODONE HYDROCHLORIDE 5 MILLIGRAM(S): 5 TABLET ORAL at 21:46

## 2020-01-01 RX ADMIN — Medication 2: at 09:22

## 2020-01-01 RX ADMIN — CHLORHEXIDINE GLUCONATE 15 MILLILITER(S): 213 SOLUTION TOPICAL at 17:01

## 2020-01-01 RX ADMIN — Medication 1 APPLICATION(S): at 05:08

## 2020-01-01 RX ADMIN — OXYCODONE HYDROCHLORIDE 5 MILLIGRAM(S): 5 TABLET ORAL at 17:28

## 2020-01-01 RX ADMIN — Medication 50 MICROGRAM(S): at 22:40

## 2020-01-01 RX ADMIN — Medication 100 MICROGRAM(S): at 05:54

## 2020-01-01 RX ADMIN — FAMOTIDINE 20 MILLIGRAM(S): 10 INJECTION INTRAVENOUS at 16:59

## 2020-01-01 RX ADMIN — Medication 2: at 16:31

## 2020-01-01 RX ADMIN — HEPARIN SODIUM 5000 UNIT(S): 5000 INJECTION INTRAVENOUS; SUBCUTANEOUS at 23:33

## 2020-01-01 RX ADMIN — Medication 200 MILLIGRAM(S): at 04:47

## 2020-01-01 RX ADMIN — Medication 650 MILLIGRAM(S): at 10:46

## 2020-01-01 RX ADMIN — OXYCODONE HYDROCHLORIDE 5 MILLIGRAM(S): 5 TABLET ORAL at 04:47

## 2020-01-01 RX ADMIN — HEPARIN SODIUM 5000 UNIT(S): 5000 INJECTION INTRAVENOUS; SUBCUTANEOUS at 13:44

## 2020-01-01 RX ADMIN — CHLORHEXIDINE GLUCONATE 15 MILLILITER(S): 213 SOLUTION TOPICAL at 05:21

## 2020-01-01 RX ADMIN — BUMETANIDE 20 MG/HR: 0.25 INJECTION INTRAMUSCULAR; INTRAVENOUS at 15:25

## 2020-01-01 RX ADMIN — OXYCODONE HYDROCHLORIDE 5 MILLIGRAM(S): 5 TABLET ORAL at 04:31

## 2020-01-01 RX ADMIN — Medication 650 MILLIGRAM(S): at 09:49

## 2020-01-01 RX ADMIN — HYDROMORPHONE HYDROCHLORIDE 1 MILLIGRAM(S): 2 INJECTION INTRAMUSCULAR; INTRAVENOUS; SUBCUTANEOUS at 16:36

## 2020-01-01 RX ADMIN — Medication 4: at 18:23

## 2020-01-01 RX ADMIN — Medication 50 MICROGRAM(S): at 21:54

## 2020-01-01 RX ADMIN — FAMOTIDINE 20 MILLIGRAM(S): 10 INJECTION INTRAVENOUS at 17:15

## 2020-01-01 RX ADMIN — MIDAZOLAM HYDROCHLORIDE 2 MILLIGRAM(S): 1 INJECTION, SOLUTION INTRAMUSCULAR; INTRAVENOUS at 16:25

## 2020-01-01 RX ADMIN — INSULIN HUMAN 10 UNIT(S): 100 INJECTION, SOLUTION SUBCUTANEOUS at 16:22

## 2020-01-01 RX ADMIN — Medication 250 MILLIGRAM(S): at 05:21

## 2020-01-01 RX ADMIN — ATORVASTATIN CALCIUM 20 MILLIGRAM(S): 80 TABLET, FILM COATED ORAL at 23:52

## 2020-01-01 RX ADMIN — Medication 500 MILLIGRAM(S): at 11:04

## 2020-01-01 RX ADMIN — FAMOTIDINE 20 MILLIGRAM(S): 10 INJECTION INTRAVENOUS at 04:47

## 2020-01-01 RX ADMIN — Medication 2: at 06:35

## 2020-01-01 RX ADMIN — SODIUM ZIRCONIUM CYCLOSILICATE 10 GRAM(S): 10 POWDER, FOR SUSPENSION ORAL at 16:22

## 2020-01-01 RX ADMIN — HYDROMORPHONE HYDROCHLORIDE 1 MILLIGRAM(S): 2 INJECTION INTRAMUSCULAR; INTRAVENOUS; SUBCUTANEOUS at 01:23

## 2020-01-01 RX ADMIN — Medication 400 MILLIGRAM(S): at 10:30

## 2020-01-01 RX ADMIN — Medication 14.9 MICROGRAM(S)/KG/MIN: at 09:07

## 2020-01-01 RX ADMIN — OXYCODONE HYDROCHLORIDE 5 MILLIGRAM(S): 5 TABLET ORAL at 21:18

## 2020-01-01 RX ADMIN — CHLORHEXIDINE GLUCONATE 15 MILLILITER(S): 213 SOLUTION TOPICAL at 06:29

## 2020-01-01 RX ADMIN — FAMOTIDINE 20 MILLIGRAM(S): 10 INJECTION INTRAVENOUS at 17:13

## 2020-01-01 RX ADMIN — ATORVASTATIN CALCIUM 20 MILLIGRAM(S): 80 TABLET, FILM COATED ORAL at 21:44

## 2020-01-01 RX ADMIN — Medication 2: at 14:09

## 2020-01-01 RX ADMIN — HYDROMORPHONE HYDROCHLORIDE 1 MILLIGRAM(S): 2 INJECTION INTRAMUSCULAR; INTRAVENOUS; SUBCUTANEOUS at 16:05

## 2020-01-01 RX ADMIN — PROPOFOL 16.7 MICROGRAM(S)/KG/MIN: 10 INJECTION, EMULSION INTRAVENOUS at 08:41

## 2020-01-01 RX ADMIN — CHLORHEXIDINE GLUCONATE 15 MILLILITER(S): 213 SOLUTION TOPICAL at 18:21

## 2020-01-01 RX ADMIN — OXYCODONE HYDROCHLORIDE 5 MILLIGRAM(S): 5 TABLET ORAL at 17:12

## 2020-01-01 RX ADMIN — Medication 20 MILLIGRAM(S): at 18:33

## 2020-01-01 RX ADMIN — FAMOTIDINE 20 MILLIGRAM(S): 10 INJECTION INTRAVENOUS at 10:11

## 2020-01-01 RX ADMIN — Medication 2: at 11:26

## 2020-01-01 RX ADMIN — KETAMINE HYDROCHLORIDE 1 MG/KG/HR: 100 INJECTION INTRAMUSCULAR; INTRAVENOUS at 11:31

## 2020-01-01 RX ADMIN — OXYCODONE HYDROCHLORIDE 5 MILLIGRAM(S): 5 TABLET ORAL at 14:37

## 2020-01-01 RX ADMIN — PROPOFOL 16.7 MICROGRAM(S)/KG/MIN: 10 INJECTION, EMULSION INTRAVENOUS at 19:48

## 2020-01-01 RX ADMIN — CEFEPIME 100 MILLIGRAM(S): 1 INJECTION, POWDER, FOR SOLUTION INTRAMUSCULAR; INTRAVENOUS at 04:26

## 2020-01-01 RX ADMIN — Medication 4: at 23:17

## 2020-01-01 RX ADMIN — PROPOFOL 16.7 MICROGRAM(S)/KG/MIN: 10 INJECTION, EMULSION INTRAVENOUS at 20:03

## 2020-01-01 RX ADMIN — VASOPRESSIN 2.4 UNIT(S)/MIN: 20 INJECTION INTRAVENOUS at 05:20

## 2020-01-01 RX ADMIN — POTASSIUM PHOSPHATE, MONOBASIC POTASSIUM PHOSPHATE, DIBASIC 83.33 MILLIMOLE(S): 236; 224 INJECTION, SOLUTION INTRAVENOUS at 04:44

## 2020-01-01 RX ADMIN — Medication 500 MILLIGRAM(S): at 06:05

## 2020-01-01 RX ADMIN — Medication 1 APPLICATION(S): at 05:22

## 2020-01-01 RX ADMIN — PROPOFOL 16.7 MICROGRAM(S)/KG/MIN: 10 INJECTION, EMULSION INTRAVENOUS at 09:07

## 2020-01-01 RX ADMIN — Medication 200 MILLIGRAM(S): at 18:33

## 2020-01-01 RX ADMIN — FAMOTIDINE 20 MILLIGRAM(S): 10 INJECTION INTRAVENOUS at 11:35

## 2020-01-01 RX ADMIN — OXYCODONE HYDROCHLORIDE 5 MILLIGRAM(S): 5 TABLET ORAL at 00:18

## 2020-01-01 RX ADMIN — PROPOFOL 16.7 MICROGRAM(S)/KG/MIN: 10 INJECTION, EMULSION INTRAVENOUS at 04:19

## 2020-01-01 RX ADMIN — Medication 50 MILLIEQUIVALENT(S): at 15:30

## 2020-01-01 RX ADMIN — Medication 20 MILLIGRAM(S): at 11:25

## 2020-01-01 RX ADMIN — CHLORHEXIDINE GLUCONATE 15 MILLILITER(S): 213 SOLUTION TOPICAL at 04:47

## 2020-01-01 RX ADMIN — AMLODIPINE BESYLATE 10 MILLIGRAM(S): 2.5 TABLET ORAL at 05:54

## 2020-01-01 RX ADMIN — Medication 400 MILLIGRAM(S): at 20:31

## 2020-01-01 RX ADMIN — ATORVASTATIN CALCIUM 20 MILLIGRAM(S): 80 TABLET, FILM COATED ORAL at 21:18

## 2020-01-01 RX ADMIN — Medication 100 MILLIEQUIVALENT(S): at 05:52

## 2020-01-01 RX ADMIN — FAMOTIDINE 20 MILLIGRAM(S): 10 INJECTION INTRAVENOUS at 06:05

## 2020-01-01 RX ADMIN — Medication 650 MILLIGRAM(S): at 05:54

## 2020-01-01 RX ADMIN — CHLORHEXIDINE GLUCONATE 15 MILLILITER(S): 213 SOLUTION TOPICAL at 17:26

## 2020-01-01 RX ADMIN — Medication 14.9 MICROGRAM(S)/KG/MIN: at 19:48

## 2020-01-01 RX ADMIN — OXYCODONE HYDROCHLORIDE 5 MILLIGRAM(S): 5 TABLET ORAL at 21:51

## 2020-01-01 RX ADMIN — Medication 200 MILLIGRAM(S): at 17:12

## 2020-01-01 RX ADMIN — Medication 250 MILLIGRAM(S): at 14:01

## 2020-01-01 RX ADMIN — SODIUM CHLORIDE 50 MILLILITER(S): 9 INJECTION INTRAMUSCULAR; INTRAVENOUS; SUBCUTANEOUS at 22:03

## 2020-01-01 RX ADMIN — Medication 650 MILLIGRAM(S): at 07:14

## 2020-01-01 RX ADMIN — CEFEPIME 100 MILLIGRAM(S): 1 INJECTION, POWDER, FOR SOLUTION INTRAMUSCULAR; INTRAVENOUS at 13:38

## 2020-01-01 RX ADMIN — ENOXAPARIN SODIUM 40 MILLIGRAM(S): 100 INJECTION SUBCUTANEOUS at 11:47

## 2020-01-01 RX ADMIN — Medication 14.9 MICROGRAM(S)/KG/MIN: at 17:53

## 2020-01-01 RX ADMIN — ATORVASTATIN CALCIUM 20 MILLIGRAM(S): 80 TABLET, FILM COATED ORAL at 21:10

## 2020-01-01 RX ADMIN — CHLORHEXIDINE GLUCONATE 15 MILLILITER(S): 213 SOLUTION TOPICAL at 16:32

## 2020-01-01 RX ADMIN — Medication 1 APPLICATION(S): at 17:14

## 2020-01-01 RX ADMIN — PROPOFOL 16.7 MICROGRAM(S)/KG/MIN: 10 INJECTION, EMULSION INTRAVENOUS at 08:59

## 2020-01-01 RX ADMIN — Medication 2: at 11:53

## 2020-01-01 RX ADMIN — ENOXAPARIN SODIUM 40 MILLIGRAM(S): 100 INJECTION SUBCUTANEOUS at 09:39

## 2020-01-01 RX ADMIN — BUMETANIDE 20 MG/HR: 0.25 INJECTION INTRAMUSCULAR; INTRAVENOUS at 00:35

## 2020-01-01 RX ADMIN — Medication 200 MILLIGRAM(S): at 17:15

## 2020-01-01 RX ADMIN — CEFEPIME 100 MILLIGRAM(S): 1 INJECTION, POWDER, FOR SOLUTION INTRAMUSCULAR; INTRAVENOUS at 06:28

## 2020-01-01 RX ADMIN — HEPARIN SODIUM 5000 UNIT(S): 5000 INJECTION INTRAVENOUS; SUBCUTANEOUS at 12:31

## 2020-01-01 RX ADMIN — Medication 1 APPLICATION(S): at 06:30

## 2020-01-01 RX ADMIN — HEPARIN SODIUM 1 UNIT(S)/HR: 5000 INJECTION INTRAVENOUS; SUBCUTANEOUS at 02:00

## 2020-01-01 RX ADMIN — Medication 1 APPLICATION(S): at 18:03

## 2020-01-01 RX ADMIN — ALBUTEROL 1 PUFF(S): 90 AEROSOL, METERED ORAL at 06:20

## 2020-01-01 RX ADMIN — CHLORHEXIDINE GLUCONATE 15 MILLILITER(S): 213 SOLUTION TOPICAL at 05:09

## 2020-01-01 RX ADMIN — CHLORHEXIDINE GLUCONATE 15 MILLILITER(S): 213 SOLUTION TOPICAL at 05:20

## 2020-01-01 RX ADMIN — Medication 40 MILLIGRAM(S): at 11:46

## 2020-01-01 RX ADMIN — Medication 2: at 05:00

## 2020-01-01 RX ADMIN — HEPARIN SODIUM 5000 UNIT(S): 5000 INJECTION INTRAVENOUS; SUBCUTANEOUS at 14:02

## 2020-01-01 RX ADMIN — Medication 200 MILLIGRAM(S): at 05:22

## 2020-01-01 RX ADMIN — Medication 100 GRAM(S): at 04:13

## 2020-01-01 RX ADMIN — CEFEPIME 100 MILLIGRAM(S): 1 INJECTION, POWDER, FOR SOLUTION INTRAMUSCULAR; INTRAVENOUS at 21:52

## 2020-01-01 RX ADMIN — CISATRACURIUM BESYLATE 10 MILLIGRAM(S): 2 INJECTION INTRAVENOUS at 12:23

## 2020-01-01 RX ADMIN — Medication 14.9 MICROGRAM(S)/KG/MIN: at 08:59

## 2020-01-01 RX ADMIN — Medication 50 MICROGRAM(S): at 23:52

## 2020-01-01 RX ADMIN — Medication 650 MILLIGRAM(S): at 18:33

## 2020-01-01 RX ADMIN — Medication 1 APPLICATION(S): at 04:47

## 2020-01-01 RX ADMIN — PROPOFOL 9.52 MICROGRAM(S)/KG/MIN: 10 INJECTION, EMULSION INTRAVENOUS at 21:09

## 2020-01-01 RX ADMIN — Medication 3.72 MICROGRAM(S)/KG/MIN: at 05:20

## 2020-01-01 RX ADMIN — ATORVASTATIN CALCIUM 20 MILLIGRAM(S): 80 TABLET, FILM COATED ORAL at 19:41

## 2020-01-01 RX ADMIN — CHLORHEXIDINE GLUCONATE 15 MILLILITER(S): 213 SOLUTION TOPICAL at 17:11

## 2020-01-01 RX ADMIN — Medication 2: at 01:53

## 2020-01-01 RX ADMIN — Medication 1 APPLICATION(S): at 05:03

## 2020-01-01 RX ADMIN — Medication 500 MILLIGRAM(S): at 11:26

## 2020-01-01 RX ADMIN — Medication 2: at 02:23

## 2020-01-01 RX ADMIN — Medication 2: at 01:00

## 2020-01-01 RX ADMIN — CHLORHEXIDINE GLUCONATE 15 MILLILITER(S): 213 SOLUTION TOPICAL at 17:14

## 2020-01-01 RX ADMIN — ENOXAPARIN SODIUM 40 MILLIGRAM(S): 100 INJECTION SUBCUTANEOUS at 16:32

## 2020-01-01 RX ADMIN — CEFEPIME 100 MILLIGRAM(S): 1 INJECTION, POWDER, FOR SOLUTION INTRAMUSCULAR; INTRAVENOUS at 05:21

## 2020-01-01 RX ADMIN — OXYCODONE HYDROCHLORIDE 5 MILLIGRAM(S): 5 TABLET ORAL at 13:44

## 2020-01-01 RX ADMIN — CEFEPIME 100 MILLIGRAM(S): 1 INJECTION, POWDER, FOR SOLUTION INTRAMUSCULAR; INTRAVENOUS at 11:04

## 2020-01-01 RX ADMIN — ATORVASTATIN CALCIUM 20 MILLIGRAM(S): 80 TABLET, FILM COATED ORAL at 21:51

## 2020-01-01 RX ADMIN — Medication 2: at 00:18

## 2020-01-01 RX ADMIN — CHLORHEXIDINE GLUCONATE 15 MILLILITER(S): 213 SOLUTION TOPICAL at 06:05

## 2020-01-01 RX ADMIN — Medication 14.9 MICROGRAM(S)/KG/MIN: at 04:30

## 2020-01-01 RX ADMIN — OXYCODONE HYDROCHLORIDE 5 MILLIGRAM(S): 5 TABLET ORAL at 01:17

## 2020-01-01 RX ADMIN — Medication 50 MICROGRAM(S): at 21:51

## 2020-01-01 RX ADMIN — Medication 2: at 17:27

## 2020-01-01 RX ADMIN — Medication 1 APPLICATION(S): at 18:10

## 2020-01-01 RX ADMIN — OXYCODONE HYDROCHLORIDE 5 MILLIGRAM(S): 5 TABLET ORAL at 17:14

## 2020-01-01 RX ADMIN — HEPARIN SODIUM 5000 UNIT(S): 5000 INJECTION INTRAVENOUS; SUBCUTANEOUS at 22:57

## 2020-01-01 RX ADMIN — FAMOTIDINE 20 MILLIGRAM(S): 10 INJECTION INTRAVENOUS at 11:27

## 2020-01-01 RX ADMIN — ATORVASTATIN CALCIUM 20 MILLIGRAM(S): 80 TABLET, FILM COATED ORAL at 23:33

## 2020-01-01 RX ADMIN — FENTANYL CITRATE 1.98 MICROGRAM(S)/KG/HR: 50 INJECTION INTRAVENOUS at 22:44

## 2020-01-01 RX ADMIN — FAMOTIDINE 20 MILLIGRAM(S): 10 INJECTION INTRAVENOUS at 10:59

## 2020-01-01 RX ADMIN — ENOXAPARIN SODIUM 30 MILLIGRAM(S): 100 INJECTION SUBCUTANEOUS at 11:35

## 2020-01-01 RX ADMIN — PROPOFOL 16.7 MICROGRAM(S)/KG/MIN: 10 INJECTION, EMULSION INTRAVENOUS at 17:53

## 2020-01-01 RX ADMIN — OXYCODONE HYDROCHLORIDE 5 MILLIGRAM(S): 5 TABLET ORAL at 10:21

## 2020-01-01 RX ADMIN — Medication 2: at 17:56

## 2020-01-01 RX ADMIN — Medication 650 MILLIGRAM(S): at 22:13

## 2020-01-01 RX ADMIN — PROPOFOL 16.7 MICROGRAM(S)/KG/MIN: 10 INJECTION, EMULSION INTRAVENOUS at 04:31

## 2020-01-01 RX ADMIN — Medication 20 MILLIGRAM(S): at 02:23

## 2020-01-01 RX ADMIN — Medication 50 MICROGRAM(S): at 23:33

## 2020-01-01 RX ADMIN — Medication 1 APPLICATION(S): at 17:27

## 2020-01-01 RX ADMIN — HYDROMORPHONE HYDROCHLORIDE 2 MG/HR: 2 INJECTION INTRAMUSCULAR; INTRAVENOUS; SUBCUTANEOUS at 12:53

## 2020-01-01 RX ADMIN — Medication 650 MILLIGRAM(S): at 09:06

## 2020-01-01 RX ADMIN — Medication 50 MICROGRAM(S): at 21:18

## 2020-01-01 RX ADMIN — Medication 50 MEQ/KG/HR: at 15:30

## 2020-01-01 RX ADMIN — OXYCODONE HYDROCHLORIDE 5 MILLIGRAM(S): 5 TABLET ORAL at 09:41

## 2020-01-01 RX ADMIN — ENOXAPARIN SODIUM 40 MILLIGRAM(S): 100 INJECTION SUBCUTANEOUS at 11:05

## 2020-01-01 RX ADMIN — CHLORHEXIDINE GLUCONATE 15 MILLILITER(S): 213 SOLUTION TOPICAL at 17:20

## 2020-01-01 RX ADMIN — HYDROMORPHONE HYDROCHLORIDE 1 MILLIGRAM(S): 2 INJECTION INTRAMUSCULAR; INTRAVENOUS; SUBCUTANEOUS at 15:50

## 2020-01-01 RX ADMIN — PROPOFOL 16.7 MICROGRAM(S)/KG/MIN: 10 INJECTION, EMULSION INTRAVENOUS at 11:27

## 2020-01-01 RX ADMIN — FAMOTIDINE 20 MILLIGRAM(S): 10 INJECTION INTRAVENOUS at 12:31

## 2020-01-01 RX ADMIN — Medication 200 MILLIGRAM(S): at 18:10

## 2020-01-01 RX ADMIN — Medication 50 MILLIGRAM(S): at 18:23

## 2020-03-10 PROBLEM — E11.9 DIABETES MELLITUS: Status: ACTIVE | Noted: 2017-04-05

## 2020-03-10 NOTE — REASON FOR VISIT
[FreeTextEntry1] : The patient comes in for followup of her hypertension, hyperlipidemia, hypothyroidism, and prediabetes. She denies any chest pains, shortness of breath, or palpitations. She is complaining of right-sided lower back pain and some right sided flank pain that seems to wrap around from the back. It is relieved by Advil which she takes sparingly.She went to a neurologist who did an MRI and found out that most of her pains are from sciatica. She has some pains in the foot which may be the sciatica or maybe because she had surgery done on that foot.\par March 26, 2019: The patient is exposed to strep with her grandchildren and she is on 10 so and 5 mg twice a day. She went to a dermatologist because of a redness of her lower extremities. He has tried cortisone cream. She denies any chest pains, shortness breath, or palpitations.\par She did have some pretzels which had salt.\par  November 12, 2019: The patient's diabetes is out of control. We will start her on metformin 500 mg twice a day. We'll send her to an endocrinologist. She still has a rash. He was told to go back to the dermatologist. She denies any chest pains, shortness of breath, or palpitations.\par July 22, 2019: The patient has no new complaints. She denies any chest pains, shortness of breath, or palpitations. She is not taking a statin because it caused some leg cramps and pains. She is taking coenzyme Q 10. On her last blood test her total cholesterol was 305. Her triglycerides were also quite high. She had a complaint of a petechial-type of rash on her lower extremities. She did show to her dermatologist was not concerned..\par March 10, 2020: The patient had a bone density which showed borderline osteoporosis in her hip joint.  The patient drinks 3 cups of caffeinated coffee a day.  She denies any new symptoms.  We discussed her blood test which were good.  She is going to an endocrinologist.  Her diabetes is under good control.  Her cholesterol is good.

## 2020-03-10 NOTE — PHYSICAL EXAM
[General Appearance - Well Developed] : well developed [Normal Appearance] : normal appearance [Well Groomed] : well groomed [General Appearance - Well Nourished] : well nourished [No Deformities] : no deformities [General Appearance - In No Acute Distress] : no acute distress [Normal Conjunctiva] : the conjunctiva exhibited no abnormalities [Eyelids - No Xanthelasma] : the eyelids demonstrated no xanthelasmas [Normal Oral Mucosa] : normal oral mucosa [No Oral Pallor] : no oral pallor [No Oral Cyanosis] : no oral cyanosis [Normal Jugular Venous A Waves Present] : normal jugular venous A waves present [Normal Jugular Venous V Waves Present] : normal jugular venous V waves present [No Jugular Venous Cabrera A Waves] : no jugular venous cabrera A waves [Respiration, Rhythm And Depth] : normal respiratory rhythm and effort [Exaggerated Use Of Accessory Muscles For Inspiration] : no accessory muscle use [Auscultation Breath Sounds / Voice Sounds] : lungs were clear to auscultation bilaterally [Heart Rate And Rhythm] : heart rate and rhythm were normal [Heart Sounds] : normal S1 and S2 [Murmurs] : no murmurs present [Abdomen Soft] : soft [Abdomen Tenderness] : non-tender [Abdomen Mass (___ Cm)] : no abdominal mass palpated [Abnormal Walk] : normal gait [Gait - Sufficient For Exercise Testing] : the gait was sufficient for exercise testing [Nail Clubbing] : no clubbing of the fingernails [Cyanosis, Localized] : no localized cyanosis [Petechial Hemorrhages (___cm)] : no petechial hemorrhages [Skin Color & Pigmentation] : normal skin color and pigmentation [No Venous Stasis] : no venous stasis [Skin Lesions] : no skin lesions [No Skin Ulcers] : no skin ulcer [No Xanthoma] : no  xanthoma was observed [Oriented To Time, Place, And Person] : oriented to person, place, and time [Affect] : the affect was normal [Mood] : the mood was normal [No Anxiety] : not feeling anxious [] : no rash

## 2020-03-10 NOTE — DISCUSSION/SUMMARY
[FreeTextEntry1] : The patient was examined. Her blood pressure was 147/84, and her pulse was 71..Her lungs were clear to auscultation. Cardiac exam was negative for murmurs rubs or gallops.  There was no cyanosis,clubbing  , but there was trace edema both lower extremities Her EKG showed normal sinus rhythm, and .no acute changes were seen. She will continue her  medication. She will return in 4 months, or earlier if needed.

## 2020-03-10 NOTE — REVIEW OF SYSTEMS
[see HPI] : see HPI [Skin: A Rash] : rash: [Negative] : Heme/Lymph [Feeling Fatigued] : not feeling fatigued [Blurry Vision] : no blurred vision [Shortness Of Breath] : no shortness of breath [Chest Pain] : no chest pain [Palpitations] : no palpitations [Cough] : no cough

## 2020-03-27 NOTE — ED PROVIDER NOTE - ATTENDING CONTRIBUTION TO CARE
75F, pmh htn, hld, DM, biba with sob, hypoxic to 86% on scene. Pt with fever, myalgias, headache, non-producive cough x 4 days. Denies cp, abd pain, n/v/d.    PE: NAD, NCAT, MMM, Trachea midline, Normal conjunctiva, lungs CTAB, S1/S2 RRR, Normal perfusion, 2+ radial pulses bilat, Abdomen Soft, NTND, No rebound/guarding, No LE edema, No deformity of extremities, No rashes,  No focal motor or sensory deficits.     Sx most likely 2/2 covid-19 infection. Check labs. CXR.

## 2020-03-27 NOTE — ED ADULT NURSE NOTE - OBJECTIVE STATEMENT
Pt presents to ED via EMSwith complaint of shortness of breath, AXOX3, hx of htn, dm, hypothyroid, pt reports 4 days of fevers at home, per EMS pt was 86% on RA on scene. Lung sounds diminished bilaterally, breathing labored, tachypneic, Pt reports HA, fatigue and malaise, no chest pain, no abd pain, no nausea vomiting or diarrhea, denies urinary sx

## 2020-03-27 NOTE — ED PROVIDER NOTE - CLINICAL SUMMARY MEDICAL DECISION MAKING FREE TEXT BOX
75F pmhx htn, hld, dm, hypothyroid pw ems for shortness of breath. per EMS hypoxic 86% on scene but >98% in triage and at time of eval 99% on ra with mild tachypnea. will get cxr, labs, monitor for hypoxia, tylenol prn analgesia, po fluids for dehydration dispo pending reassessment

## 2020-03-27 NOTE — PROVIDER CONTACT NOTE (OTHER) - ACTION/TREATMENT ORDERED:
Notified Nisha. Ordered to give 650mg oral Tylenol and to place ice packs on pt. Tylenol administered & ice packs placed on pt. Will re-assess temperature. Will continue to monitor pt. Notified NP Nisha. Ordered to give 650mg oral Tylenol and to place ice packs on pt. Tylenol administered & ice packs placed on pt. Will re-assess temperature. Will continue to monitor pt.

## 2020-03-27 NOTE — ED PROVIDER NOTE - INTERNATIONAL TRAVEL
No
37 y/o M w/ PMH of HTN, HLD, CAD x3 stents and Asthma presents for cardiac catheretization  On 2/24 CATH: drug eluting stent mRCA, for stage II PCI of LAD  on monday 2/27 LHC: failed stagged PCI to LCx  Patient developed chest pain s/p cath . STAT echo performed at that time and was negative for pericardial effusion .   On 2/28 patient stable for discharge home as per Dr. Hernandez . Follow up with Dr. Hoyos this week . Please call office for appointment

## 2020-03-27 NOTE — H&P ADULT - HISTORY OF PRESENT ILLNESS
ED  Physician NOTE     75F pmhx htn, hld, dm, hypothyroid pw ems for shortness of breath. per EMS hypoxic 86% on scene but >98% in triage and at time of eval 99% on ra with mild tachypnea. reports 4 days of fever, headache, myalgias, malaise, non-productive cough, sore throat, rhinorrhea, shortness of breath. Denies chest pain, abdominal pain, nausea, vomiting, diarrhea, constipation, urinary symptoms.    ED   : ED  Physician NOTE     75F pmhx htn, hld, dm, hypothyroid pw ems for shortness of breath. per EMS hypoxic 86% on scene.  As per  patient started not feeling well about 5 days ago with general weakness, fatigue , general headache, body ache, poor appetite and 2 days ago , she developped fever up to 102.9F at home and she took 2tylenol with temporary abbattement of the fever which then recurred this AM with temp of 102F as per  and again she took tylenol.  As per  , no cough, no GI complaints, no chest pain .  NO actual SOB but this Am ,  states that patient was not breathing right and they call an ambulance service who checked her SPO2 and was found to have SPO2 of 86% at home.   Denies chest pain, abdominal pain, nausea, vomiting, diarrhea, constipation, urinary symptoms.  As per  , there is no history of travel within or outside of the , no known sick contact .  Patient went to a party about 2 weeks ago in [facility] with the family only and goes to [employer] as need and also drops her children to their school .  Otherwise no major events.    ED   :    Tm= 100.7F   HR = 87   SPO2= was 86% on RA and was placed on supplemental O2 with  NC but patient was still saturating at 84% on 5L NC and was then placed on NR Mask with improvement in the SPO2

## 2020-03-27 NOTE — ED PROVIDER NOTE - OBJECTIVE STATEMENT
75F w/ pmh *** p/w 75F pmhx htn, hld, dm, hypothyroid pw ems for shortness of breath. per EMS hypoxic 86% on scene but >98% in triage and at time of eval 99% on ra with mild tachypnea. reports 4 days of fever, headache, myalgias, malaise, non-productive cough, sore throat, rhinorrhea, shortness of breath. Denies chest pain, abdominal pain, nausea, vomiting, diarrhea, constipation, urinary symptoms.

## 2020-03-27 NOTE — H&P ADULT - ASSESSMENT
75F pmhx htn, hld, dm, hypothyroid pw ems for shortness of breath. per EMS hypoxic 86% on scene.  As per  patient started not feeling well about 5 days ago with general weakness, fatigue , general headache, body ache, poor appetite and 2 days ago , she developped fever up to 102.9F at home and she took 2tylenol with temporary abbattement of the fever which then recurred this AM with temp of 102F as per  and again she took tylenol.  Patient was found to have positive COVID -19 positive

## 2020-03-27 NOTE — H&P ADULT - PROBLEM SELECTOR PLAN 1
SPO2 : 86% in the ED ( RA) ---> 84% on 5 L NC --> NR   Albuterol HFA   ABG in Am   If patient decompensates and not maitaining O2 saturation , please call [facility] for evaluation and intubation if needed

## 2020-03-27 NOTE — H&P ADULT - NSHPLABSRESULTS_GEN_ALL_CORE
Lab results are reviewed by me with  : WBC = 4.31  H/H  14.5/41.1   PLT = 158  Lact = 2.1   PT = 7.33   Na 132   K= 3.9  HCO3= 21  Cr = 1.24   AST= 43  ALT = 31  COVID-19 : positive  CXR  : COarsening of interstitial markings at the left lung base

## 2020-03-27 NOTE — ED ADULT NURSE NOTE - NSIMPLEMENTINTERV_GEN_ALL_ED
Implemented All Fall Risk Interventions:  [place] to call system. Call bell, personal items and telephone within reach. Instruct patient to call for assistance. Room bathroom lighting operational. Non-slip footwear when patient is off stretcher. Physically safe environment: no spills, clutter or unnecessary equipment. Stretcher in lowest position, wheels locked, appropriate side rails in place. Provide visual cue, wrist band, yellow gown, etc. Monitor gait and stability. Monitor for mental status changes and reorient to person, place, and time. Review medications for side effects contributing to fall risk. Reinforce activity limits and safety measures with patient and family.

## 2020-03-27 NOTE — ED PROVIDER NOTE - PHYSICAL EXAMINATION
Physical Exam:  Gen: NAD, AOx3, non-toxic appearing  Head: normal appearing  HEENT: normal conjunctiva, oral mucosa moist  Lung:  no respiratory distress, mild tachypnea, speaking in full sentences, clear to ascultation bilaterally     CV: regular rate and rhythm   Abd: soft, ND, NT  MSK: no visible deformities  Neuro: No focal deficits  Skin: Warm  Psych: normal affect  ~Chris Cruz D.O. -Resident

## 2020-03-27 NOTE — H&P ADULT - NSICDXPASTMEDICALHX_GEN_ALL_CORE_FT
PAST MEDICAL HISTORY:  Dyslipidemia     Essential hypertension     Hypothyroidism     Type 2 diabetes mellitus

## 2020-03-27 NOTE — ED PROVIDER NOTE - NS ED ROS FT
ROS:  GENERAL: + fever, +chills  EYES: no change in vision  HEENT: no trouble swallowing, no trouble speaking, +sore throat  CARDIAC: no chest pain  PULMONARY: + cough, + shortness of breath  GI: no abdominal pain, no nausea, no vomiting, no diarrhea, no constipation  : No dysuria, no frequency, no change in appearance, or odor of urine  SKIN: no rashes  NEURO: + headache, + weakness  MSK: +myalgias   ~Chris Cruz D.O. -Resident

## 2020-03-27 NOTE — H&P ADULT - REASON FOR ADMISSION
fever, fatigue x 5 days  per EMS pt spo2 was 86%on RA fatigue x 5 days, and fever X 2 days .  per EMS pt spo2 was 86%on RA

## 2020-03-27 NOTE — H&P ADULT - PROBLEM SELECTOR PLAN 3
COVID-19 positive with SPO2 <94 %   With coarsening of left lung marking   will start patient on Hydroxychloroquine / I have explained to patient's  ( Mr Lupe, Mukul about the side effects of the medication and that the medication  is not FDA approved for COVID-19 yet.  Case was discussed with ID Fellow who recommends the Hydroxychloroquine  Monitor SPO2

## 2020-03-27 NOTE — H&P ADULT - PROBLEM SELECTOR PLAN 9
Dr Vinay Rose's office was called and the office is closed   Please call again during the Weekdays when the office reopens

## 2020-03-27 NOTE — ED ADULT NURSE NOTE - NS ED NURSE LEVEL OF CONSCIOUSNESS MENTAL STATUS
2017  EMPLOYEE INFORMATION: EMPLOYER INFORMATION:   NAME: Yovani ALLEN [place]   : 1958    DATE OF INJURY/EVENT: 3/28/2017           Location: [facility]   Treating Provider: Bon Portillo MD  Time In:  2:41 PM Time Out:  2:54 PM      DIAGNOSIS:   1. Left shoulder strain, subsequent encounter      STATUS: This injury is determined to be WORK RELATED.    RETURN TO WORK:  Employee may return to work without restrictions.  Employee is discharged from care.      RESTRICTIONS:   Restrictions are to be followed at work and at home.  Restrictions are in effect until next follow-up visit.  No Restrictions        Thank you for the privilege of providing medical care for this injury/condition.  If there are any questions, please call the occupational health clinic at Dept: [phone].  Electronically signed on 2017 at 2:54 PM by:   Jazmyne Peralta LPN   [facility]  On 2017, IJazmyne LPN scribed the services personally performed by Bon Portillo MD    
Alert/Awake

## 2020-03-28 NOTE — CONSULT NOTE ADULT - ATTENDING COMMENTS
75F pmhx htn, hld, dm, hypothyroid pw ems for shortness of breath  Fever, no leukocytosis  COVID positive  CXR generally clear with possible infiltrate on LLL  RRT this morning for hypoxia with readjustment of NRB and positioning  No signs bacterial process, although PCT slightly trending up  Overall,  1) COVID+  - Continue Hydroxychloroquine, 5 day course total  - O2 supplementation per primary team  - Monitor EKGs for QTC  - Low threshold for MICU eval  - Check PCT, ferritin, crp q 48 hours  2) Fever  - Monitor for other potential sources infection  - Trend for further fevers    Will defer remaining ID care to 8mon/geographic COVID attending, however please call me if I can be of further assistance    Todd Angel MD  Pager [phone]  After 5pm and on weekends call [phone] 75F pmhx htn, hld, dm, hypothyroid pw ems for shortness of breath  Fever, no leukocytosis  COVID positive  CXR generally clear with possible infiltrate on LLL  RRT this morning for hypoxia with readjustment of NRB and positioning  No signs bacterial process, although PCT slightly trending up  Overall,  1) COVID+  - Continue Hydroxychloroquine, 5 day course total  - O2 supplementation per primary team  - Monitor EKGs for QTC  - Low threshold for MICU eval  - Check PCT, ferritin, crp q 48 hours  2) Fever  - Monitor for other potential sources infection  - Trend for further fevers    Will defer remaining ID care to 8mon/geographic COVID attending, however please call me if I can be of further assistance    Todd Angel MD  Pager [phone]  After 5pm and on weekends call [phone]    I was physically present for the key portions of the evaluation and management service provided. I saw and examined the patient. I agree with the above history, physical, and plan except for any discrepancies which I have documented in “Attending Attestation.” Please refer to “Attending Attestation” for final plan.

## 2020-03-28 NOTE — CONSULT NOTE ADULT - ATTENDING COMMENTS
74 yo female, admitted for respiratory failure on mechanical ventilation, for critical care management    N On propofol gtt and hydromorphone IV push PRN  P Per ARDS protocol high PEEP  C No pressors, CVC and arterial line placed  H/ID Covid19 admission labs, on hydroxychloroquine, no abx

## 2020-03-28 NOTE — PROVIDER CONTACT NOTE (OTHER) - ASSESSMENT
Pt A&Ox4. /77, HR 75, Temp 100.9, O2 84-86% on 15L non-rebreather. Pt has no c/o of discomfort. Pt A&Ox4. /77, HR 75, Temp 100.9, O2 84% on 15L non-rebreather. Pt has no c/o of discomfort.

## 2020-03-28 NOTE — CONSULT NOTE ADULT - SUBJECTIVE AND OBJECTIVE BOX
Patient is a 75y old  Female who presents with a chief complaint of fatigue x 5 days, and fever X 2 days .  per EMS pt spo2 was 86%on RA (27 Mar 2020 17:09)    HPI:  ED  Physician NOTE     75F pmhx htn, hld, dm, hypothyroid pw ems for shortness of breath. per EMS hypoxic 86% on scene.  As per  patient started not feeling well about 5 days ago with general weakness, fatigue , general headache, body ache, poor appetite and 2 days ago , she developped fever up to 102.9F at home and she took 2tylenol with temporary abbattement of the fever which then recurred this AM with temp of 102F as per  and again she took tylenol.  As per  , no cough, no GI complaints, no chest pain .  NO actual SOB but this Am ,  states that patient was not breathing right and they call an ambulance service who checked her SPO2 and was found to have SPO2 of 86% at home.   Denies chest pain, abdominal pain, nausea, vomiting, diarrhea, constipation, urinary symptoms.  As per  , there is no history of travel within or outside of the , no known sick contact .  [place] [facility] [place] [facility] [place] and goes to [employer] as need and also drops her children to their school .  Otherwise no major events.    ED   :    Tm= 100.7F   HR = 87   SPO2= was 86% on RA and was placed on supplemental O2 with  NC but patient was still saturating at 84% on 5L NC and was then placed on NR Mask with improvement in the SPO2 (27 Mar 2020 17:09)  ===========  -  RRT called this AM for hypoxia to the 86 % on NRB. Pulse ox switched to forehead with improvement in oxygen level to 91-92%.  Patient turned to left lateral decub position to the 94-95%.     prior hospital charts reviewed [  ]  primary team notes reviewed [  ]  other consultant notes reviewed [  ]    PAST MEDICAL & SURGICAL HISTORY:  Hypothyroidism  Dyslipidemia  Type 2 diabetes mellitus  Essential hypertension  H/O foot surgery  History of cataract surgery    Allergies  lidocaine (Other)    ANTIMICROBIALS (past 90 days)  MEDICATIONS  (STANDING):    hydroxychloroquine   400 milliGRAM(s) Oral (03-27-20 @ 20:31)      ANTIMICROBIALS:    hydroxychloroquine    hydroxychloroquine 400 every 12 hours  hydroxychloroquine 200 every 12 hours    OTHER MEDS: MEDICATIONS  (STANDING):  acetaminophen   Tablet .. 650 every 6 hours PRN  ALBUTerol    90 MICROgram(s) HFA Inhaler 1 every 6 hours PRN  amLODIPine   Tablet 10 daily  atorvastatin 20 at bedtime  dextrose 40% Gel 15 once PRN  dextrose 50% Injectable 12.5 once  dextrose 50% Injectable 25 once  enoxaparin Injectable 40 daily  glucagon  Injectable 1 once PRN  influenza   Vaccine 0.5 once  insulin lispro (HumaLOG) corrective regimen sliding scale  three times a day before meals  levothyroxine 100 daily    SOCIAL HISTORY:   hx smoking  non-smoker    FAMILY HISTORY:    REVIEW OF SYSTEMS  [  ] ROS unobtainable because:    [  ] All other systems negative except as noted below:	    Constitutional:  [ ] fever [ ] chills  [ ] weight loss  [ ] weakness  Skin:  [ ] rash [ ] phlebitis	  Eyes: [ ] icterus [ ] pain  [ ] discharge	  ENMT: [ ] sore throat  [ ] thrush [ ] ulcers [ ] exudates  Respiratory: [ ] dyspnea [ ] hemoptysis [ ] cough [ ] sputum	  Cardiovascular:  [ ] chest pain [ ] palpitations [ ] edema	  Gastrointestinal:  [ ] nausea [ ] vomiting [ ] diarrhea [ ] constipation [ ] pain	  Genitourinary:  [ ] dysuria [ ] frequency [ ] hematuria [ ] discharge [ ] flank pain  [ ] incontinence  Musculoskeletal:  [ ] myalgias [ ] arthralgias [ ] arthritis  [ ] back pain  Neurological:  [ ] headache [ ] seizures  [ ] confusion/altered mental status  Psychiatric:  [ ] anxiety [ ] depression	  Hematology/Lymphatics:  [ ] lymphadenopathy  Endocrine:  [ ] adrenal [ ] thyroid  Allergic/Immunologic:	 [ ] transplant [ ] seasonal    Vital Signs Last 24 Hrs  T(F): 100.9 (03-28-20 @ 05:58), Max: 102.3 (03-27-20 @ 22:00)  Vital Signs Last 24 Hrs  HR: 75 (03-28-20 @ 05:58) (75 - 87)  BP: 131/77 (03-28-20 @ 05:58) (114/70 - 157/74)  RR: 26 (03-28-20 @ 06:50)  SpO2: 96% (03-28-20 @ 06:50) (84% - 100%)  Wt(kg): --    EXAM:  Constitutional: Not in acute distress  Eyes: pupils bilaterally reactive to light. No icterus.  Oral cavity: Clear, no lesions  Neck: No neck vein distension noted  RS: Chest clear to auscultation bilaterally. No wheeze/rhonchi/crepitations.  CVS: S1, S2 heard. Regular rate and rhythm. No murmurs/rubs/gallops.  Abdomen: Soft. No guarding/rigidity/tenderness.  : No acute abnormalities  Extremities: Warm. No pedal edema  Skin: No lesions noted  Vascular: No evidence of phlebitis  Neuro: Alert, oriented to time/place/person                          14.1   4.31  )-----------( 158      ( 27 Mar 2020 14:19 )             41.1     03-27    132<L>  |  94<L>  |  21  ----------------------------<  137<H>  3.9   |  21<L>  |  1.24    Ca    8.9      27 Mar 2020 14:19    TPro  7.5  /  Alb  4.2  /  TBili  0.3  /  DBili  x   /  AST  43<H>  /  ALT  31  /  AlkPhos  57  03-27    MICROBIOLOGY:                RADIOLOGY:  imaging below personally reviewed  < from: Xray Chest 1 View- PORTABLE-Urgent (03.27.20 @ 14:20) >  Coarsening of interstitial markings at the left lung base. See above.    < end of copied text >        OTHER TESTS: Patient is a 75y old  Female who presents with a chief complaint of fatigue x 5 days, and fever X 2 days .  per EMS pt spo2 was 86%on RA (27 Mar 2020 17:09)    HPI:    75F pmhx htn, hld, dm, hypothyroid pw ems for shortness of breath. per EMS hypoxic 86% on scene.  As per  patient started not feeling well about 5 days ago with general weakness, fatigue , general headache, body ache, poor appetite and 2 days ago , she developped fever up to 102.9F at home and she took 2tylenol with temporary abbattement of the fever which then recurred this AM with temp of 102F as per  and again she took tylenol.  As per  , no cough, no GI complaints, no chest pain .  NO actual SOB but this Am ,  states that patient was not breathing right and they call an ambulance service who checked her SPO2 and was found to have SPO2 of 86% at home.   Denies chest pain, abdominal pain, nausea, vomiting, diarrhea, constipation, urinary symptoms.  As per  , there is no history of travel within or outside of the , no known sick contact .  Patient went to a party about 2 weeks ago in [facility] with the family only and goes to [employer] as need and also drops her children to their school .  Otherwise no major events.    ED   :    Tm= 100.7F   HR = 87   SPO2= was 86% on RA and was placed on supplemental O2 with  NC but patient was still saturating at 84% on 5L NC and was then placed on NR Mask with improvement in the SPO2 (27 Mar 2020 17:09)  ===========  -  RRT called this AM for hypoxia to the 86 % on NRB. Pulse ox switched to forehead with improvement in oxygen level to 91-92%.  Patient turned to left lateral decub position to the 94-95%.   - ID consulted for recs  - pt assessed at bedside. Appears comfortable on NRB  - Denied dysuria, loose stools, recent travel, sick contacts    prior hospital charts reviewed [x]  primary team notes reviewed [x]    PAST MEDICAL & SURGICAL HISTORY:  Hypothyroidism  Dyslipidemia  Type 2 diabetes mellitus  Essential hypertension  H/O foot surgery  History of cataract surgery    Allergies  lidocaine (Other)    ANTIMICROBIALS (past 90 days)  MEDICATIONS  (STANDING):    hydroxychloroquine   400 milliGRAM(s) Oral (03-27-20 @ 20:31)      ANTIMICROBIALS:    hydroxychloroquine    hydroxychloroquine 400 every 12 hours  hydroxychloroquine 200 every 12 hours    OTHER MEDS: MEDICATIONS  (STANDING):  acetaminophen   Tablet .. 650 every 6 hours PRN  ALBUTerol    90 MICROgram(s) HFA Inhaler 1 every 6 hours PRN  amLODIPine   Tablet 10 daily  atorvastatin 20 at bedtime  dextrose 40% Gel 15 once PRN  dextrose 50% Injectable 12.5 once  dextrose 50% Injectable 25 once  enoxaparin Injectable 40 daily  glucagon  Injectable 1 once PRN  influenza   Vaccine 0.5 once  insulin lispro (HumaLOG) corrective regimen sliding scale  three times a day before meals  levothyroxine 100 daily    SOCIAL HISTORY:  - lives with  at home.     FAMILY HISTORY: No sick contacts known in family    REVIEW OF SYSTEMS  [  ] ROS unobtainable because:    [x] All other systems negative except as noted below:	  Constitutional:  [x] fever [x] chills  [ ] weight loss  [ ] weakness  Skin:  [ ] rash [ ] phlebitis	  Eyes: [ ] icterus [ ] pain  [ ] discharge	  ENMT: [ ] sore throat  [ ] thrush [ ] ulcers [ ] exudates  Respiratory: [x] dyspnea [ ] hemoptysis [x] cough [ ] sputum	  Cardiovascular:  [ ] chest pain [ ] palpitations [ ] edema	  Gastrointestinal:  [ ] nausea [ ] vomiting [ ] diarrhea [ ] constipation [ ] pain	  Genitourinary:  [ ] dysuria [ ] frequency [ ] hematuria [ ] discharge [ ] flank pain  [ ] incontinence  Musculoskeletal:  [ ] myalgias [ ] arthralgias [ ] arthritis  [ ] back pain  Neurological:  [ ] headache [ ] seizures  [ ] confusion/altered mental status  Psychiatric:  [ ] anxiety [ ] depression	  Hematology/Lymphatics:  [ ] lymphadenopathy  Endocrine:  [ ] adrenal [ ] thyroid  Allergic/Immunologic:	 [ ] transplant [ ] seasonal    Vital Signs Last 24 Hrs  T(F): 100.9 (03-28-20 @ 05:58), Max: 102.3 (03-27-20 @ 22:00)  Vital Signs Last 24 Hrs  HR: 75 (03-28-20 @ 05:58) (75 - 87)  BP: 131/77 (03-28-20 @ 05:58) (114/70 - 157/74)  RR: 26 (03-28-20 @ 06:50)  SpO2: 96% (03-28-20 @ 06:50) (84% - 100%)  Wt(kg): --    EXAM:  Constitutional: Not in acute distress. Appears comfortable on NRB  Eyes: pupils bilaterally reactive to light. No icterus.  Oral cavity: Clear, no lesions  Neck: No neck vein distension noted  RS: Coarse breath sounds at bases b/l. No wheeze/rhonchi  CVS: S1, S2 heard. Regular rate and rhythm. No murmurs/rubs/gallops.  Abdomen: Soft. No guarding/rigidity/tenderness.  : No acute abnormalities  Extremities: Warm. B/l pitting pedal edema  Skin: No lesions noted  Vascular: No evidence of phlebitis  Neuro: Alert, oriented to time/place/person                          14.1   4.31  )-----------( 158      ( 27 Mar 2020 14:19 )             41.1     03-27    132<L>  |  94<L>  |  21  ----------------------------<  137<H>  3.9   |  21<L>  |  1.24    Ca    8.9      27 Mar 2020 14:19    TPro  7.5  /  Alb  4.2  /  TBili  0.3  /  DBili  x   /  AST  43<H>  /  ALT  31  /  AlkPhos  57  03-27    MICROBIOLOGY:  COVID-19 PCR . (03.27.20 @ 14:13)    COVID-19 PCR: Detected: All “detected” results on this new test are considered presumptively  positive results, are clinically actionable, and specimens will be  forwarded to [facility] for confirmation testing.  Another report (corrected report) will only be issued if discordant  results occur.  This test has been validated by [employer] to be accurate;  though it has not been FDA cleared/approved by the usual pathway.  As with all laboratory tests, results should be correlated with clinical  findings.        RADIOLOGY:  imaging below personally reviewed  < from: Xray Chest 1 View- PORTABLE-Urgent (03.27.20 @ 14:20) >  Coarsening of interstitial markings at the left lung base. See above.    < end of copied text >        OTHER TESTS: Patient is a 75y old  Female who presents with a chief complaint of fatigue x 5 days, and fever X 2 days .  per EMS pt spo2 was 86%on RA (27 Mar 2020 17:09)    HPI:    75F pmhx htn, hld, dm, hypothyroid pw ems for shortness of breath. per EMS hypoxic 86% on scene.  As per  patient started not feeling well about 5 days ago with general weakness, fatigue , general headache, body ache, poor appetite and 2 days ago , she developped fever up to 102.9F at home and she took 2tylenol with temporary abbattement of the fever which then recurred this AM with temp of 102F as per  and again she took tylenol.  As per  , no cough, no GI complaints, no chest pain .  NO actual SOB but this Am ,  states that patient was not breathing right and they call an ambulance service who checked her SPO2 and was found to have SPO2 of 86% at home.   Denies chest pain, abdominal pain, nausea, vomiting, diarrhea, constipation, urinary symptoms.  As per  , there is no history of travel within or outside of the , no known sick contact .  Patient went to a party about 2 weeks ago in [facility] with the family only and goes to [employer] as need and also drops her children to their school .  Otherwise no major events.    ED   :    Tm= 100.7F   HR = 87   SPO2= was 86% on RA and was placed on supplemental O2 with  NC but patient was still saturating at 84% on 5L NC and was then placed on NR Mask with improvement in the SPO2 (27 Mar 2020 17:09)  ===========  -  RRT called this AM for hypoxia to the 86 % on NRB. Pulse ox switched to forehead with improvement in oxygen level to 91-92%.  Patient turned to left lateral decub position to the 94-95%.   - ID consulted for recs  - pt assessed at bedside. Appears comfortable on NRB  - Denied dysuria, loose stools, recent travel, sick contacts    prior hospital charts reviewed [x]  primary team notes reviewed [x]    PAST MEDICAL & SURGICAL HISTORY:  Hypothyroidism  Dyslipidemia  Type 2 diabetes mellitus  Essential hypertension  H/O foot surgery  History of cataract surgery    Allergies  lidocaine (Other)    ANTIMICROBIALS (past 90 days)  MEDICATIONS  (STANDING):    hydroxychloroquine   400 milliGRAM(s) Oral (03-27-20 @ 20:31)      ANTIMICROBIALS:    hydroxychloroquine    hydroxychloroquine 400 every 12 hours  hydroxychloroquine 200 every 12 hours    OTHER MEDS: MEDICATIONS  (STANDING):  acetaminophen   Tablet .. 650 every 6 hours PRN  ALBUTerol    90 MICROgram(s) HFA Inhaler 1 every 6 hours PRN  amLODIPine   Tablet 10 daily  atorvastatin 20 at bedtime  dextrose 40% Gel 15 once PRN  dextrose 50% Injectable 12.5 once  dextrose 50% Injectable 25 once  enoxaparin Injectable 40 daily  glucagon  Injectable 1 once PRN  influenza   Vaccine 0.5 once  insulin lispro (HumaLOG) corrective regimen sliding scale  three times a day before meals  levothyroxine 100 daily    SOCIAL HISTORY:  - lives with  at home.     FAMILY HISTORY: No sick contacts known in family    REVIEW OF SYSTEMS  [  ] ROS unobtainable because:    [x] All other systems negative except as noted below:	  Constitutional:  [x] fever [x] chills  [ ] weight loss  [ ] weakness  Skin:  [ ] rash [ ] phlebitis	  Eyes: [ ] icterus [ ] pain  [ ] discharge	  ENMT: [ ] sore throat  [ ] thrush [ ] ulcers [ ] exudates  Respiratory: [x] dyspnea [ ] hemoptysis [x] cough [ ] sputum	  Cardiovascular:  [ ] chest pain [ ] palpitations [ ] edema	  Gastrointestinal:  [ ] nausea [ ] vomiting [ ] diarrhea [ ] constipation [ ] pain	  Genitourinary:  [ ] dysuria [ ] frequency [ ] hematuria [ ] discharge [ ] flank pain  [ ] incontinence  Musculoskeletal:  [ ] myalgias [ ] arthralgias [ ] arthritis  [ ] back pain  Neurological:  [ ] headache [ ] seizures  [ ] confusion/altered mental status  Psychiatric:  [ ] anxiety [ ] depression	  Hematology/Lymphatics:  [ ] lymphadenopathy  Endocrine:  [ ] adrenal [ ] thyroid  Allergic/Immunologic:	 [ ] transplant [ ] seasonal    Vital Signs Last 24 Hrs  T(F): 100.9 (03-28-20 @ 05:58), Max: 102.3 (03-27-20 @ 22:00)  Vital Signs Last 24 Hrs  HR: 75 (03-28-20 @ 05:58) (75 - 87)  BP: 131/77 (03-28-20 @ 05:58) (114/70 - 157/74)  RR: 26 (03-28-20 @ 06:50)  SpO2: 96% (03-28-20 @ 06:50) (84% - 100%)  Wt(kg): --    EXAM:  Constitutional: Not in acute distress. Appears comfortable on NRB  Eyes: pupils bilaterally reactive to light. No icterus.  Oral cavity: Clear, no lesions  Neck: No neck vein distension noted  RS: Coarse breath sounds at bases b/l. No wheeze/rhonchi  CVS: S1, S2 heard. Regular rate and rhythm. No murmurs/rubs/gallops.  Abdomen: Soft. No guarding/rigidity/tenderness.  : No acute abnormalities  Extremities: Warm. B/l pitting pedal edema  Skin: No lesions noted  Vascular: No evidence of phlebitis  Neuro: Alert, oriented to time/place/person                          14.1   4.31  )-----------( 158      ( 27 Mar 2020 14:19 )             41.1     03-27    132<L>  |  94<L>  |  21  ----------------------------<  137<H>  3.9   |  21<L>  |  1.24    Ca    8.9      27 Mar 2020 14:19    TPro  7.5  /  Alb  4.2  /  TBili  0.3  /  DBili  x   /  AST  43<H>  /  ALT  31  /  AlkPhos  57  03-27    MICROBIOLOGY:  COVID-19 PCR . (03.27.20 @ 14:13)    COVID-19 PCR: Detected: All “detected” results on this new test are considered presumptively  positive results, are clinically actionable, and specimens will be  forwarded to [facility] for confirmation testing.  Another report (corrected report) will only be issued if discordant  results occur.  This test has been validated by [employer] to be accurate;  though it has not been FDA cleared/approved by the usual pathway.  As with all laboratory tests, results should be correlated with clinical  findings.    RADIOLOGY:  imaging below personally reviewed  < from: Xray Chest 1 View- PORTABLE-Urgent (03.27.20 @ 14:20) >  Coarsening of interstitial markings at the left lung base. See above.

## 2020-03-28 NOTE — PROVIDER CONTACT NOTE (OTHER) - ACTION/TREATMENT ORDERED:
Notified NP Nisha. Ordered to administer PRN tylenol and keep pt on 15L non-rebreather. Pt given PRN Tylenol. Awaiting for NP to come to assess pt, awaiting further orders. Will monitor.

## 2020-03-28 NOTE — CONSULT NOTE ADULT - ASSESSMENT
ASSESSMENT:    RECOMMENDATIONS:    LORNA Root, MD  Fellow, Infectious Diseases  Pager: [phone]  After 5pm and on Weekends: Call [phone] ASSESSMENT:  75/F with PMH HTN, HLD. DM, Hypothyroid.  H/o fever (Tmax 102.9), weakness, fatigue, headache, myalgias, malaise for 2 days.  On DOA, c/o dyspnea, EMS called, found to have SPO2 of 86% at home.  In ED, febrile (Tmax 100.7), SpO2 86% on RA (84% on 5L NC, then NRB started). Labs revealed lymphopenia and mild AST elevation. CXR revealed left sided infiltrates. Covid-19 PCR +ve.  RRT called this AM for hypoxia to the 86 % on NRB. Pulse ox switched to forehead with improvement in oxygen level to 91-92%.  Patient turned to left lateral decub position to the 94-95%.   ID consulted for recs  ==========  Covid-19 infection  - currently appears stable on NRB  - started on HCQ last night. Outpt EKG done 3/10 showed QTc ~413  - , Ferritin 857, CRP 3.62, Procalcitonin 0.29    RECOMMENDATIONS:  - continue Hydroxychloroquine - 400mg PO q12h X 2 doses (Day 1), followed by 200mg PO q12h x 4 days [Total 5 days of therapy]  - check repeat LFTs  - check EKG for QTc  - f/u G6PD level  - continue Contact and Airborne isolation    LORNA Root, MD  Fellow, Infectious Diseases  Pager: [phone]  After 5pm and on Weekends: Call [phone]

## 2020-03-28 NOTE — CHART NOTE - NSCHARTNOTEFT_GEN_A_CORE
RRT and anesthesia was called as patient's pulse ox was in 75 % .  Earlier today RRT was called and placed patient on 100% NRBM  and 10 L NC. Patient was intubated and transferred to ICU.  Patient was admitted with Covid  Patient's daughter was notified by RRT/anesthesia team. Paged   primary attending and awaiting for call back.  Fabienne Alexander NP  [phone]

## 2020-03-28 NOTE — RAPID RESPONSE TEAM SUMMARY - NSSITUATIONBACKGROUNDRRT_GEN_ALL_CORE
75F pmhx htn, hld, dm, hypothyroid pw ems for shortness of breath. per EMS hypoxic 86% on scene.    Patient was found to have positive COVID -19 positive . RRT called for hypoxia to the 86 % on NRB. Patient A&Ox3 expressed that she would like to remain full code. Pulse ox switched to forehead with improvement in oxygen level to 91-92%.  Patient turned to left lateral decub position to the 94-95%. 75F pmhx htn, hld, dm, hypothyroid pw ems for shortness of breath. per EMS hypoxic 86% on scene.    Patient was found to have positive COVID -19 positive . RRT called for hypoxia to the 86 % on NRB.

## 2020-03-28 NOTE — PROVIDER CONTACT NOTE (OTHER) - REASON
Pt temp 100.9, O2 sat between 84-86% on 15L non-rebreather Pt temp 100.9, O2 sat between 84% on 15L non-rebreather

## 2020-03-28 NOTE — PROVIDER CONTACT NOTE (OTHER) - SITUATION
Made NP Denehy aware of pt VS including temp 100.9 orally, O2 sat 84% on 15L non-rebreather, HR 79, RR 24, /55.

## 2020-03-28 NOTE — CONSULT NOTE ADULT - SUBJECTIVE AND OBJECTIVE BOX
ICU Consult Note    Age/Gender: 74y/o Female  Comorbidities: HTN, HLD. DM2, Hypothyroidism.    Admitted: 03-27  Intubated: 03-28    HPI:  75F w/ DM2, HTN, HLD, hypothyroidism admitted 3/27 w/ shortness of breath, was hypoxic 86%. 5 days prior had general weakness, fatigue , general headache, body ache, poor appetite. 2 days PTA, developed fever 102.9F at home. On admission pt denied chest pain, abdominal pain, nausea, vomiting, diarrhea, constipation, urinary symptoms.  As per  , there is no history of travel within or outside of the [place], no known sick contact .  Patient went to a party about 2 weeks ago in [facility] with the family only and goes to [employer] as need and also drops her children to their school .  Otherwise no major events.    ED :    Tm= 100.7F   HR = 87   SPO2= was 86% on RA and was placed on supplemental O2 with  NC but patient was still saturating at 84% on 5L NC and was then placed on NR Mask with improvement in the SPO2 (27 Mar 2020 17:09)  -  RRT called this AM for hypoxia to the 86 % on NRB. Pulse ox switched to forehead with improvement in oxygen level to 91-92%.  Patient turned to left lateral decub position to the 94-95%.     Interval Events:  Rapid response called around 15:30 3/28 for hypoxia to 75% on 10L non-rebreather. Pt intubated by anesthesia and transferred to ICU.      COVID-PCR (03-27): Detected    RESPIRATORY  [ X ] Acute respiratory failure with: [ X ] hypoxemia   /   [  ] hypercapnia  [ X ] ARDS    Daily vent settings / pressures:  --_16_ / _450_:  80% / +14  /   (peak) /   (plateau)  --__ / __:   % / +  /   (peak) /   (plateau)    RR: _16_ / min  IBW: _50_ kg (actual body weight 79.3)  TV: __ mL (__ mL/kg)  (03-28-20 @ 15:48): 7.25 / 278 (PaO2) / 51 / 22  (03-28-20 @ 07:27): 7.50 / 186 (PaO2) / 26 / 20    [  ] inhaled tPA for plastic bronchitis      CARDIOVASCULAR - currently hemodynamically stable without pressors  -- Hx HTN, HLD. Continue home atorvastatin but hold amlodipine and losartan  Septic shock  --[  ] norepinephrine infusion  --[  ] vasopressin infusion      NEURO  --[ X ] propofol  --[  ] dexmedetomidine  --[  ] midazolam  --[ X ] dilaudid pushes prn  --[  ] cisatracurium      RENAL  --[  ] CKD stage __ (baseline Cr: __)  --[  ] YELITZA current BUN __ /Cr __  --[  ] on renal replacement therapy:   --[  ] Lasix infusion (started __/__)  --[  ] Lasix intermittent  --[  ] electrolyte abnormalities: none      ID  --[ X ] hydroxychloroquine x 5 days (_3_/_27_ - _3_/_31_)    QTc: _411_ msec  --[ X ] vitamin C x 5 days (_3_/_27_ - __/__)  --[  ] azithromycin (dates: __/__ - __/__)  --[  ] ceftriaxone (dates: __/__ - __/__)  --[  ] other:     Procalcitonin: 0.54 (03-28)  0.78 (03-28)  0.29 (03-27)    Relevant positive cultures:  Relevant pending cultures:       GI  -- NPO, start TF tomorrow  --[  ] TF __  _ kcal/mL (current rate: __ mL/hr)  ----goal rate:  __ mL/hr + __ packets ProSource daily ([  ] assuming propofol @ 50 mcg/kg/min)  --[ X ] famotidine bid  --[  ] pantoprazole      ENDOCRINE: Hx hypothyroidism, DM2  --[ x ] insulin regimen: [  ] infusion,  [  ] Lantus,  [  ] NPH,  [ x ] sliding scale  --well controlled at home on Januvia  -- continue home synthroid 50 mcg qhs IV      HEME  --[ x ] chemical VTE prophylaxis: enoxaparin 40 mg daily      LINES  [ X ] central venous catheter: insertion date _3/28_,  location _RIJ_  [ X ] arterial line: insertion date _3/28_,  location _right radial_      FAMILY UPDATED  Name (Divine Andrade - daughter, physician) [phone] - _3_/_28_ @ __:__ by ALMITA Goldsmith ICU Consult Note    Age/Gender: 76y/o Female  Comorbidities: HTN, HLD. DM2, Hypothyroidism.    Admitted: 03-27  Intubated: 03-28    HPI:  75F w/ DM2, HTN, HLD, hypothyroidism admitted 3/27 w/ shortness of breath, was hypoxic 86%. 5 days prior had general weakness, fatigue , general headache, body ache, poor appetite. 2 days PTA, developed fever 102.9F at home. On admission pt denied chest pain, abdominal pain, nausea, vomiting, diarrhea, constipation, urinary symptoms.  As per  , there is no history of travel within or outside of the [place], no known sick contact .  Patient went to a party about 2 weeks ago in [facility] with the family only and goes to [employer] as need and also drops her children to their school .  Otherwise no major events.    ED :    Tm= 100.7F   HR = 87   SPO2= was 86% on RA and was placed on supplemental O2 with  NC but patient was still saturating at 84% on 5L NC and was then placed on NR Mask with improvement in the SPO2 (27 Mar 2020 17:09)  -  RRT called this AM for hypoxia to the 86 % on NRB. Pulse ox switched to forehead with improvement in oxygen level to 91-92%.  Patient turned to left lateral decub position to the 94-95%.     Interval Events:  Rapid response called around 15:30 3/28 for hypoxia to 75% on 10L non-rebreather. Pt intubated by anesthesia and transferred to ICU.      COVID-PCR (03-27): Detected    RESPIRATORY  [ X ] Acute respiratory failure with: [ X ] hypoxemia   /   [  ] hypercapnia  [ X ] ARDS    Daily vent settings / pressures:  --_16_ / _450_:  80% / +14  /   (peak) /   (plateau)  --__ / __:   % / +  /   (peak) /   (plateau)    RR: _16_ / min  IBW: _50_ kg (actual body weight 79.3)  TV: __ mL (__ mL/kg)  (03-28-20 @ 15:48): 7.25 / 278 (PaO2) / 51 / 22  (03-28-20 @ 07:27): 7.50 / 186 (PaO2) / 26 / 20    [  ] inhaled tPA for plastic bronchitis      CARDIOVASCULAR - currently hemodynamically stable without pressors  -- Hx HTN, HLD. Continue home atorvastatin but hold amlodipine and losartan  Septic shock  --[  ] norepinephrine infusion  --[  ] vasopressin infusion      NEURO  --[ X ] propofol  --[  ] dexmedetomidine  --[  ] midazolam  --[ X ] dilaudid pushes prn  --[  ] cisatracurium      RENAL  --[  ] CKD stage __ (baseline Cr: __)  --[  ] YELITZA current BUN __ /Cr __  --[  ] on renal replacement therapy:   --[  ] Lasix infusion (started __/__)  --[  ] Lasix intermittent  --[  ] electrolyte abnormalities: none      ID  --[ X ] hydroxychloroquine x 5 days (_3_/_27_ - _3_/_31_)    QTc: _411_ msec  --[ X ] vitamin C x 5 days (_3_/_27_ - __/__)  --[  ] azithromycin (dates: __/__ - __/__)  --[  ] ceftriaxone (dates: __/__ - __/__)  --[  ] other:     Procalcitonin: 0.54 (03-28)  0.78 (03-28)  0.29 (03-27)    Relevant positive cultures:  Relevant pending cultures:       GI  -- NPO, start TF tomorrow  --[  ] TF __  _ kcal/mL (current rate: __ mL/hr)  ----goal rate:  __ mL/hr + __ packets ProSource daily ([  ] assuming propofol @ 50 mcg/kg/min)  --[ X ] famotidine bid  --[  ] pantoprazole      ENDOCRINE: Hx hypothyroidism, DM2  --[ x ] insulin regimen: [  ] infusion,  [  ] Lantus,  [  ] NPH,  [ x ] sliding scale  --well controlled at home on Januvia  -- continue home synthroid 50 mcg qhs IV      HEME  --[ x ] chemical VTE prophylaxis: enoxaparin 40 mg daily      LINES  [ X ] central venous catheter: insertion date _3/28_,  location _RIJ_  [ X ] arterial line: insertion date _3/28_,  location _right radial_      FAMILY UPDATED  Name (Divine Andrade - daughter, physician) [phone] - _3_/_28_ @ _17_:_30_ by ALMITA Goldsmith

## 2020-03-28 NOTE — PROVIDER CONTACT NOTE (OTHER) - ACTION/TREATMENT ORDERED:
Pt assessed by NP. Tylenol PRN administered for fever. Albuterol 90mcg given as ordered by NP. Pt placed on 10L NC & 15L non-breather as per NP instruction w/ no change in status. RRT called. Pt assessed by NP. Tylenol PRN administered for fever. Albuterol 90mcg PRN given as ordered by NP. Pt placed on 10L NC & 15L non-breather as per NP instruction w/ no change in status. RRT called.

## 2020-03-28 NOTE — RAPID RESPONSE TEAM SUMMARY - NSOTHERINTERVENTIONSRRT_GEN_ALL_CORE
GOC discussion with patient and daughter, pt to be full code and all interventions. Anesthesia already called overhead by NP, intubated w/o issue, transferred to PICU for further care.
Patient A&Ox3 expressed that she would like to remain full code. Pulse ox switched to forehead with improvement in oxygen level to 91-92%.  Patient turned to left lateral decub position to the 94-95%.

## 2020-03-28 NOTE — CHART NOTE - NSCHARTNOTEFT_GEN_A_CORE
Called  by  RN  to  evaluate  pt  for  oxygen  of  86  %  on  NRB.  RRT  was  called  and  placed  on  10  liter  NC  and  turned  on  her  side.  pt    oxygen level  goes  up  to   94  %     at  this  time.     Endorsed  to  primary  team  to  follow  up.  Pt hemodynamically  stable.

## 2020-03-28 NOTE — RAPID RESPONSE TEAM SUMMARY - NSSITUATIONBACKGROUNDRRT_GEN_ALL_CORE
75F pmhx htn, hld, dm, hypothyroid pw ems for shortness of breath. per EMS hypoxic 86% on scene.    Patient was found to have positive COVID -19 positive . RRT called for hypoxia to the 75% on NRB and NC.

## 2020-03-28 NOTE — PROVIDER CONTACT NOTE (OTHER) - SITUATION
Pt temp 100.7, O2 sat between 84-86% on 15L non-rebreather Pt temp 100.9, O2 sat between 84-86% on 15L non-rebreather Pt temp 100.9, O2 sat between 84% on 15L non-rebreather

## 2020-03-28 NOTE — PROCEDURE NOTE - NSNEEDLEGAUGE_GEN_A_CORE
20 Airway patent, Nasal mucosa clear. Mouth with normal mucosa. Throat has no vesicles, no oropharyngeal exudates and uvula is midline.

## 2020-03-29 NOTE — PROGRESS NOTE ADULT - SUBJECTIVE AND OBJECTIVE BOX
HISTORY  Admitted: 03-27  Intubated: 03-28    HPI:  75F w/ DM2, HTN, HLD, hypothyroidism admitted 3/27 w/ shortness of breath, was hypoxic 86%. 5 days prior had general weakness, fatigue , general headache, body ache, poor appetite. 2 days PTA, developed fever 102.9F at home. On admission pt denied chest pain, abdominal pain, nausea, vomiting, diarrhea, constipation, urinary symptoms.  As per  , there is no history of travel within or outside of the [place], no known sick contact .  Patient went to a party about 2 weeks ago in [place] with the family only and goes to [employer] as need and also drops her children to their school .  Otherwise no major events.    ED :    Tm= 100.7F   HR = 87   SPO2= was 86% on RA and was placed on supplemental O2 with  NC but patient was still saturating at 84% on 5L NC and was then placed on NR Mask with improvement in the SPO2 (27 Mar 2020 17:09)  -  RRT called this AM for hypoxia to the 86 % on NRB. Pulse ox switched to forehead with improvement in oxygen level to 91-92%.  Patient turned to left lateral decub position to the 94-95%.     24 HOUR EVENTS:  - Decreased Fip2 to 40  - Will d/c rectal tube if no diarrhea       NEURO  RASS:  0--1  Meds: acetaminophen   Tablet .. 650 milliGRAM(s) Oral every 6 hours PRN Temp greater or equal to 38C (100.4F)  HYDROmorphone  Injectable 1 milliGRAM(s) IV Push every 2 hours PRN Ventilator dyssynchrony  propofol Infusion 20 MICROgram(s)/kG/Min IV Continuous <Continuous>    [x] Adequacy of sedation and pain control has been assessed and adjusted      RESPIRATORY  RR: 18 (03-28-20 @ 23:00) (16 - 26)  SpO2: 91% (03-28-20 @ 23:53) (84% - 100%)  Wt(kg): --  Exam: unlabored, clear to auscultation bilaterally  Mechanical Ventilation: Mode: AC/ CMV (Assist Control/ Continuous Mandatory Ventilation), RR (machine): 16, RR (patient): 18, TV (machine): 400, FiO2: 40, PEEP: 14, ITime: 1, MAP: 22, PIP: 32  ABG - ( 28 Mar 2020 15:48 )  pH: 7.25  /  pCO2: 51    /  pO2: 278   / HCO3: 22    / Base Excess: -5.8  /  SaO2: 99      Lactate: x          CARDIOVASCULAR  HR: 71 (03-28-20 @ 23:53) (70 - 109)  BP: 90/51 (03-28-20 @ 18:00) (90/51 - 175/68)  BP(mean): 62 (03-28-20 @ 18:00) (61 - 97)  ABP: 131/54 (03-28-20 @ 23:00) (113/45 - 151/56)  ABP(mean): 79 (03-28-20 @ 23:00) (62 - 83)  Wt(kg): --  CVP(cm H2O): --  VBG - ( 27 Mar 2020 14:19 )  pH: 7.33  /  pCO2: 48    /  pO2: <20   / HCO3: 25    / Base Excess: -1.1  /  SaO2: 22     Lactate: 2.1              Lactate, Blood: 1.0 mmol/L (03-28 @ 09:09)    Exam: regular rate and rhythm  Cardiac Rhythm: sinus  Perfusion     [x]Adequate   [ ]Inadequate  Mentation   [x]Normal       [ ]Reduced  Extremities  [x]Warm         [ ]Cool  Volume Status [x ]Hypervolemic []Euvolemic [ ]Hypovolemic  Meds:       GI/NUTRITION  Diet:NPO  Meds: famotidine Injectable 20 milliGRAM(s) IV Push two times a day      GENITOURINARY  I&O's Detail    03-27 @ 07:01  -  03-28 @ 07:00  --------------------------------------------------------  IN:    Oral Fluid: 250 mL    sodium chloride 0.9%: 450 mL  Total IN: 700 mL    OUT:    Voided: 250 mL  Total OUT: 250 mL    Total NET: 450 mL      03-28 @ 07:01  -  03-29 @ 00:36  --------------------------------------------------------  IN:    propofol Infusion: 97.7 mL  Total IN: 97.7 mL    OUT:    Indwelling Catheter - Urethral: 570 mL  Total OUT: 570 mL    Total NET: -472.3 mL        Weight (kg): 79.3 (03-28 @ 14:45)  03-28    131<L>  |  97  |  16  ----------------------------<  276<H>  3.9   |  18<L>  |  0.70    Ca    8.1<L>      28 Mar 2020 15:52  Phos  3.3     03-28  Mg     2.0     03-28    TPro  7.0  /  Alb  3.6  /  TBili  0.3  /  DBili  x   /  AST  68<H>  /  ALT  35  /  AlkPhos  61  03-28    [ ] Alamo catheter, indication: N/A  Meds:       HEMATOLOGIC  Meds: enoxaparin Injectable 40 milliGRAM(s) SubCutaneous daily    [x] VTE Prophylaxis                        14.0   5.51  )-----------( 177      ( 28 Mar 2020 15:52 )             41.9     PT/INR - ( 28 Mar 2020 15:52 )   PT: 12.9 sec;   INR: 1.12 ratio         PTT - ( 28 Mar 2020 15:52 )  PTT:32.6 sec  Transfusion     [ ] PRBC   [ ] Platelets   [ ] FFP   [ ] Cryoprecipitate      INFECTIOUS DISEASES  WBC Count: 5.51 K/uL (03-28 @ 15:52)  WBC Count: 3.53 K/uL (03-28 @ 09:09)    RECENT CULTURES:    Meds: hydroxychloroquine 200 milliGRAM(s) Oral two times a day  influenza   Vaccine 0.5 milliLiter(s) IntraMuscular once        ENDOCRINE  CAPILLARY BLOOD GLUCOSE      POCT Blood Glucose.: 93 mg/dL (28 Mar 2020 22:14)  POCT Blood Glucose.: 244 mg/dL (28 Mar 2020 17:14)  POCT Blood Glucose.: 128 mg/dL (28 Mar 2020 06:33)    Meds: atorvastatin 20 milliGRAM(s) Oral at bedtime  glucagon  Injectable 1 milliGRAM(s) IntraMuscular once PRN  insulin lispro (HumaLOG) corrective regimen sliding scale   SubCutaneous every 4 hours  levothyroxine Injectable 50 MICROGram(s) IV Push at bedtime        ACCESS DEVICES:  [ ] Peripheral IV  [ ] Central Venous Line	[ ] R	[ ] L	[ ] IJ	[ ] Fem	[ ] SC	Placed:   [ ] Arterial Line		[ ] R	[ ] L	[ ] Fem	[ ] Rad	[ ] Ax	Placed:   [ ] PICC:					[ ] Mediport  [ ] Urinary Catheter, Date Placed:   [x] Necessity of urinary, arterial, and venous catheters discussed    OTHER MEDICATIONS:  chlorhexidine 0.12% Liquid 15 milliLiter(s) Oral Mucosa every 12 hours      CODE STATUS:      IMAGING:

## 2020-03-29 NOTE — PROGRESS NOTE ADULT - ASSESSMENT
75F w/ DM2, HTN, HLD, hypothyroidism admitted 3/27 w/ shortness of breath, was hypoxic 86%. Intubated on 3/28.    Neuro:   - Propofol and Dilaudid PRN     Respiratory:   - Vent: 400/16/14/40  - F/u ABG    CV: stable off pressors. QTc 411    GI:   - NPO, NGT on CLWS  - Pepcid BID    :   - Adequate UOP  - Cr at baseline   - Alamo    Heme: LVX for dvt ppx    ID: COVID +  - On Hydroxychloroquine and Vit C(3/27-3/31)  - Procal daily   - No Abx at this time     Endo: DM2  - SSI    Lines: RIJ TLC, Right radial A line, [place] 75F w/ DM2, HTN, HLD, hypothyroidism admitted 3/27 w/ shortness of breath, was hypoxic 86%. Intubated on 3/28.    Neuro:   - Propofol and Dilaudid PRN     Respiratory:   - Vent: 400/16/14/40  - F/u ABG    CV: stable off pressors. QTc 411    GI:   - NPO, NGT on CLWS  - Pepcid BID  - Has rectal tube --> dc if no diarrhea     :   - Adequate UOP  - Cr at baseline   - [place]    Heme: LVX for dvt ppx    ID: COVID +  - On Hydroxychloroquine and Vit C(3/27-3/31)  - Procal daily   - No Abx at this time     Endo: DM2  - SSI    Lines: RIJ TLC, Right radial A line, Alamo 75F w/ DM2, HTN, HLD, hypothyroidism admitted 3/27 w/ shortness of breath, was hypoxic 86%. Intubated on 3/28.    Neuro:   - Propofol and Dilaudid PRN     Respiratory:   - Vent: 400/16/14/40  - F/u ABG    CV: stable off pressors. QTc 411    GI:   - NPO, NGT on CLWS  - Pepcid BID  - Has rectal tube --> dc if no diarrhea     :   - Adequate UOP  - Cr at baseline   - [place]    Heme: LVX for dvt ppx    ID: COVID +  - On Hydroxychloroquine and Vit C(3/27-3/31)  - Procal daily   - No Abx at this time     Endo: DM2  - SSI    Lines: RIJ TLC, Right radial A line, Jasmeet       Patient's daughter, Dr. Divine Andrade, was updated about the patient's condition on 3/29/20 at 1:30pm by RODO Colby

## 2020-03-29 NOTE — PROGRESS NOTE ADULT - ATTENDING COMMENTS
N Propofol gtt, dilaudid push PRN  P Per ARDS net high PEEP; ; Pplt 28  C Off pressors  R Net -528, adequate UOP. No YELITZA  H/ID Procalcitonin to 5.0 from 0.5, broad spectrum abx cefepime/vancomycin; BCx/UA

## 2020-03-30 NOTE — PROGRESS NOTE ADULT - ATTENDING COMMENTS
75F with DM, HTN, HLD, hypothyroid admitted with acute hypoxemic respiratory failure 2/2 COVID    Neuro: continue propofol, dilaudid prn for sedation and vent synchrony. Will add oxycodone solution via OGT.  Resp: Acute hypoxemic respiratory failure 2/2 covid pna, with moderate ARDS - on PEEP 14/FIO2 40%; PF ratio 166. Will wean PEEP/FIO2 according to ARDSnet high PEEP table. Pplat 27, will maintain <30.  CV: hypotensive likely 2/2 sedation, on levo 0.04. Will maintain MAP>65.   GI: Will continue TFs.   : Net + 700cc, will give lasix 40.  Heme: On lovenox for VTE prophylaxis.  ID: COVID + now on hydroxychloroquine course, will monitor QTc with daily EKG. On cefepime for + UA, will send U cx.   Endo: Will continue SSI for glycemic control.   Lines: R radial a line, RIJ TLC,, wolff    Patient seen and discussed with Dr. Beth.    GRISELDA Yen  Surgical Critical Care Fellow

## 2020-03-30 NOTE — PROGRESS NOTE ADULT - SUBJECTIVE AND OBJECTIVE BOX
HISTORY    HPI:  75F w/ DM2, HTN, HLD, hypothyroidism admitted 3/27 w/ shortness of breath, was hypoxic 86%. 5 days prior had general weakness, fatigue , general headache, body ache, poor appetite. 2 days PTA, developed fever 102.9F at home. On admission pt denied chest pain, abdominal pain, nausea, vomiting, diarrhea, constipation, urinary symptoms.  As per  , there is no history of travel within or outside of the [place], no known sick contact .  Patient [facility] the family only and goes to [employer] as need and also drops her children to their school .  Otherwise no major events.    ED :    Tm= 100.7F   HR = 87   SPO2= was 86% on RA and was placed on supplemental O2 with  NC but patient was still saturating at 84% on 5L NC and was then placed on NR Mask with improvement in the SPO2 (27 Mar 2020 17:09)  -  RRT called this AM for hypoxia to the 86 % on NRB. Pulse ox switched to forehead with improvement in oxygen level to 91-92%.  Patient turned to left lateral decub position to the 94-95%.     24 HOUR EVENTS:  - Adjusted vent: FiO2 30%  - Given Lasix 20  - Started TF at trickle  - Increased procal -> UA and started Vanc/Cefep for presumed UA +   - Bcx sent on 3/29      NEURO  RASS: <-2  Exam: awake, alert, oriented  Meds: acetaminophen    Suspension .. 650 milliGRAM(s) Enteral Tube every 6 hours PRN Temp greater or equal to 38C (100.4F)  HYDROmorphone  Injectable 1 milliGRAM(s) IV Push every 2 hours PRN Ventilator dyssynchrony  propofol Infusion 35 MICROgram(s)/kG/Min IV Continuous <Continuous>        RESPIRATORY  RR: 24 (03-30-20 @ 00:00) (17 - 26)  SpO2: 93% (03-30-20 @ 00:00) (90% - 100%)  Wt(kg): --  Mechanical Ventilation: Mode: AC/ CMV (Assist Control/ Continuous Mandatory Ventilation), RR (machine): 14, RR (patient): 23, TV (machine): 400, FiO2: 30, PEEP: 14, ITime: 1, MAP: 17, PIP: 23  ABG - ( 29 Mar 2020 01:36 )  pH: 7.38  /  pCO2: 36    /  pO2: 82    / HCO3: 21    / Base Excess: -3.2  /  SaO2: 96      Lactate: x              CARDIOVASCULAR  HR: 74 (03-30-20 @ 00:00) (65 - 81)  BP: --  BP(mean): --  ABP: 119/45 (03-30-20 @ 00:00) (87/40 - 145/53)  ABP(mean): 66 (03-30-20 @ 00:00) (53 - 82)  Wt(kg): --  CVP(cm H2O): --      Exam: regular rate and rhythm  Cardiac Rhythm: sinus    GI/NUTRITION  Diet: TF  Meds: famotidine Injectable 20 milliGRAM(s) IV Push two times a day      GENITOURINARY  I&O's Detail    03-28 @ 07:01  -  03-29 @ 07:00  --------------------------------------------------------  IN:    IV PiggyBack: 50 mL    propofol Infusion: 83.5 mL    propofol Infusion: 147.8 mL  Total IN: 281.3 mL    OUT:    Indwelling Catheter - Urethral: 810 mL  Total OUT: 810 mL    Total NET: -528.7 mL      03-29 @ 07:01  -  03-30 @ 00:14  --------------------------------------------------------  IN:    Enteral Tube Flush: 250 mL    Glucerna 1.5: 110 mL    IV PiggyBack: 350 mL    propofol Infusion: 176 mL  Total IN: 886 mL    OUT:    Indwelling Catheter - Urethral: 840 mL  Total OUT: 840 mL    Total NET: 46 mL          03-29    135  |  101  |  22  ----------------------------<  105<H>  3.9   |  19<L>  |  0.99    Ca    8.1<L>      29 Mar 2020 01:49  Phos  3.5     03-29  Mg     2.1     03-29    TPro  6.1  /  Alb  3.2<L>  /  TBili  0.3  /  DBili  x   /  AST  68<H>  /  ALT  30  /  AlkPhos  52  03-29    [ ] [place] catheter, indication: N/A  Meds: ascorbic acid 500 milliGRAM(s) Oral daily        HEMATOLOGIC  Meds: enoxaparin Injectable 40 milliGRAM(s) SubCutaneous daily    [x] VTE Prophylaxis                        12.2   3.64  )-----------( 147      ( 29 Mar 2020 01:49 )             36.0     PT/INR - ( 28 Mar 2020 15:52 )   PT: 12.9 sec;   INR: 1.12 ratio         PTT - ( 28 Mar 2020 15:52 )  PTT:32.6 sec  Transfusion     [ ] PRBC   [ ] Platelets   [ ] FFP   [ ] Cryoprecipitate      INFECTIOUS DISEASES  WBC Count: 3.64 K/uL (03-29 @ 01:49)    RECENT CULTURES:    Meds: cefepime   IVPB 2000 milliGRAM(s) IV Intermittent every 8 hours  hydroxychloroquine 200 milliGRAM(s) Oral two times a day  influenza   Vaccine 0.5 milliLiter(s) IntraMuscular once  vancomycin  IVPB 1000 milliGRAM(s) IV Intermittent every 12 hours

## 2020-03-30 NOTE — DIETITIAN INITIAL EVALUATION ADULT. - ENERGY INTAKE
Total calories from EN (1295 cathy) and daily Propofol (321 cathy) = 1616 cathy/day (20cal/Kg); meets estimated needs. Total protein from EN (65 Gm; 1.3 Gm/Kg); does not meet estimated needs. Tube feeding currently infusing at 40ml/hr (goal 45ml/hr)

## 2020-03-30 NOTE — DIETITIAN INITIAL EVALUATION ADULT. - PERTINENT LABORATORY DATA
03-30 @ 01:53: Hemoglobin 11.8, Hematocrit 34.8  03-30 @ 01:52: Sodium 134<L>, Potassium 3.5, Chloride 100, Calcium 8.0<L>, Magnesium 1.9, Phosphorus 3.3, BUN 24<H>, Creatinine 1.01, <H>, Alk Phos 55, ALT/SGPT 26, AST/SGOT 58<H>, Total Protein 6.2, Albumin 3.1<L>, Total Bilirubin 0.3,   HbA1c 6.3%, CRP 11.81, Ferritin 3083, , Triglycerides, Serum: 323 mg/dL (03-30-20 @ 01:52)  POCT Blood Glucose.: 166 mg/dL (30 Mar 2020 09:21)  POCT Blood Glucose.: 168 mg/dL (30 Mar 2020 05:50)  POCT Blood Glucose.: 112 mg/dL (29 Mar 2020 21:50)  POCT Blood Glucose.: 130 mg/dL (29 Mar 2020 19:06)  POCT Blood Glucose.: 123 mg/dL (29 Mar 2020 13:17)

## 2020-03-30 NOTE — DIETITIAN INITIAL EVALUATION ADULT. - ENTERAL
Glucerna1.2 @ 45ml/hr x 24 hours provides 1080 ml formula, 1296 cathy/day, 65 Gm protein/day, 869 ml free water; meets 16cal/Kg per dosing wt 79.3Kg and 1.3 Gm/Kg protein per IBW 50Kg. Add 2 Prosource (120cal, 30Gm protein) for a total of 95 Gm protein/day (1.9Gm/Kg per IBW)

## 2020-03-30 NOTE — DIETITIAN INITIAL EVALUATION ADULT. - REASON INDICATOR FOR ASSESSMENT
Nutrition Assessment warranted for length of stay on COVID ICU  Information obtained from: medical record, communication with team. Pt intubated.  Per chart: "75F w/ DM2, HTN, HLD, hypothyroidism admitted 3/27 w/ shortness of breath, was hypoxic 86%. Intubated on 3/28."

## 2020-03-30 NOTE — DIETITIAN INITIAL EVALUATION ADULT. - PERTINENT MEDS FT
MEDICATIONS  (STANDING):  ascorbic acid 500 milliGRAM(s) Oral daily  atorvastatin 20 milliGRAM(s) Oral at bedtime  cefepime   IVPB 2000 milliGRAM(s) IV Intermittent every 8 hours  chlorhexidine 0.12% Liquid 15 milliLiter(s) Oral Mucosa every 12 hours  enoxaparin Injectable 40 milliGRAM(s) SubCutaneous daily  famotidine Injectable 20 milliGRAM(s) IV Push two times a day  hydroxychloroquine 200 milliGRAM(s) Oral two times a day  influenza   Vaccine 0.5 milliLiter(s) IntraMuscular once  insulin lispro (HumaLOG) corrective regimen sliding scale   SubCutaneous every 4 hours  levothyroxine Injectable 50 MICROGram(s) IV Push at bedtime  norepinephrine Infusion 0.1 MICROgram(s)/kG/Min (14.9 mL/Hr) IV Continuous <Continuous>  petrolatum Ophthalmic Ointment 1 Application(s) Both EYES two times a day  propofol Infusion 35 MICROgram(s)/kG/Min (16.7 mL/Hr) IV Continuous <Continuous>  vancomycin  IVPB 1000 milliGRAM(s) IV Intermittent every 12 hours

## 2020-03-30 NOTE — PROGRESS NOTE ADULT - ASSESSMENT
75F w/ DM2, HTN, HLD, hypothyroidism admitted 3/27 w/ shortness of breath, was hypoxic 86%. Intubated on 3/28.      Neuro:   - Propofol, Dilaudid PRN, Tylenol  - No hx of paralysis     Respiratory:   - Vent: 400/14/14/40-->Fio2 30%  - P/F: 205  - No hx of proning    CV: stable off pressors.  -  QTc 438    GI:   - 3/29: Glucerna @10 --> goal of 45  - Pepcid BID  - D/c'ed rectal tube --> dc no diarrhea on 3/29    : Cr 0.99  - Lasix 20 at PM --> good UOP    Heme: LVX for dvt ppx    ID: COVID +  - On Hydroxychloroquine and Vit C(3/27-3/31)  - Procal 5.9 (0.54) --> empiric vanc/cefepime (3/29 - )--> reconsider Vanco since pt comes from home  - Bcx sent on 3/29  - UA + 3/29    Endo: DM2  - SSI      Lines: RIJ TLC (3/28), Right radial A line (3/28) 75F w/ DM2, HTN, HLD, hypothyroidism admitted 3/27 w/ shortness of breath, was hypoxic 86%. Intubated on 3/28.      Neuro:   - Propofol, Dilaudid PRN, Tylenol  - No hx of paralysis     Respiratory:   - Vent: 400/14/14/40  - ABG 7.43/32/50/21 (on FIO2 of 30% --> increased to 40%)  - P/F: 205  - No hx of proning    CV:   - Started Levo (0.04)  -  QTc 438  - Net positive fluid status   - Troponin: 56, will repeat Wed 4/1    GI:   - 3/29: Glucerna @45  - Pepcid BID  - D/c'ed rectal tube --> dc no diarrhea on 3/29    : Cr 0.99  - Will give 40 mg Lasix today (20mg 3/29 --> good UOP)  - Slighly hyponatremic  - IVL    Heme:   - LVX for dvt ppx    ID: COVID +  - On Hydroxychloroquine and Vit C(3/27-3/31)  - Procal 5.9 (0.54) --> was started on empiric vanc/cefepime (3/29)  - D/c Vanc today, continue cefepime   - Bcx sent on 3/29  - UA + 3/29  - Order Ucx    Endo: DM2  - SSI      Lines: RIJ TLC (3/28), Right radial A line (3/28)

## 2020-03-30 NOTE — DIETITIAN INITIAL EVALUATION ADULT. - ADD RECOMMEND
1) Continue EN as above. Monitor need to increase EN rate as Propofol rate is decreased. 2. Add 2 Prosource (120cal, 30Gm protein) to meet protein needs. 3. Trend triglycerides every 2-3 days.

## 2020-03-30 NOTE — DIETITIAN INITIAL EVALUATION ADULT. - OTHER INFO
Unable to conduct in-person interview due to limited contact restrictions related to current medical condition and isolation precautions. Telephone interview inappropriate as pt intubated and sedated. RD remains available to team.     INFORMATION PTA  Diet PTA: Unavailable, pt reported poor appetite PTA  Nutrition status PTA: DM2 managed with Januvia and Metformin, HTN, HLD, obese, BMI>30  Nutrition Supplements PTA: none noted  Food Allergies: NKFA  Weight History PTA (per HIE): 80.7Kg (8/1/18), 80.7Kg (3/26/19), 82.1Kg (11/12/19), 78.5Kg (3/10/20), 79.3Kg (3/28/20 dosing)    COVID-19 / NUTRITION STATUS  Last BM: 3/30; rectal tube placed, no output recorded thus far  Bowel Regimen: none  Propofol infused x 24-hours 292ml = 321 calories from lipid  No proning, no Nimbex  Rx for vit C

## 2020-03-30 NOTE — DIETITIAN INITIAL EVALUATION ADULT. - ENERGY NEEDS
The Sacha State Equation (PSU) 2003b was used to calculate resting energy expenditure: xxxx calories (xx cathy/Kg per dosing wt 79.3Kg) The modified Sacha State equation (ARMSTRONG) 2010 equation was used to calculate resting energy expenditure: 1778 calories (22 cathy/Kg per dosing wt 79.3Kg)

## 2020-03-30 NOTE — DIETITIAN INITIAL EVALUATION ADULT. - PROBLEM SELECTOR PLAN 1
SPO2 : 86% in the ED ( RA) ---> 84% on 5 L NC --> NR   Albuterol HFA   ABG in Am   If patient decompensates and not maitaining O2 saturation , please call [facility] MICU for evaluation and intubation if needed

## 2020-03-30 NOTE — DIETITIAN INITIAL EVALUATION ADULT. - PHYSICAL APPEARANCE
ht: 5 feet 2 inches, admit wt: 175 pounds, BMI: 32 Kg/m2, IBW: 110 pounds (+/- 10%), 159% IBW/well nourished/other (specify)/obese Edema: none noted  Skin: no pressure injuries noted per nursing flowsheet  Nutrition Focused Physical Exam: unable to assess

## 2020-03-31 NOTE — PROGRESS NOTE ADULT - ASSESSMENT
75F w/ DM2, HTN, HLD, hypothyroidism admitted 3/27 w/ shortness of breath, was hypoxic 86%. 5 days prior had general weakness, fatigue , general headache, body ache, poor appetite. 2 days PTA, developed fever 102.9F at home. On admission pt denied chest pain, abdominal pain, nausea, vomiting, diarrhea, constipation, urinary symptoms.  As per  , there is no history of travel within or outside of the [place], no known sick contact .  Patient went to a party about 2 weeks ago in [facility] with the family only and goes to [employer] as need and also drops her children to their school . 75F w/ DM2, HTN, HLD, hypothyroidism admitted 3/27 w/ shortness of breath, was hypoxic 86%. 5 days prior had general weakness, fatigue , general headache, body ache, poor appetite. 2 days PTA, developed fever 102.9F at home. On admission pt denied chest pain, abdominal pain, nausea, vomiting, diarrhea, constipation, urinary symptoms.  As per  , there is no history of travel within or outside of the [place], no known sick contact .  Patient went to a party about 2 weeks ago in [facility] with the family only and goes to [employer] as need and also drops her children to their school. 75F w/ DM2, HTN, HLD, hypothyroidism admitted 3/27 w/ shortness of breath, was hypoxic 86%. 5 days prior had general weakness, fatigue , general headache, body ache, poor appetite. 2 days PTA, developed fever 102.9F at home. On admission pt denied chest pain, abdominal pain, nausea, vomiting, diarrhea, constipation, urinary symptoms.  As per  , there is no history of travel within or outside of the [place], no known sick contact .  Patient went to a party about 2 weeks ago in [facility] with the family only and goes to [employer] as need and also drops her children to their school.    Neuro:   - Propofol, Dilaudid PRN, Tylenol  - No hx of paralysis     Respiratory:   - Vent: 400/14/14/40  - ABG 7.43/32/50/21 (on FIO2 of 30% --> increased to 40%)  - P/F: 205  - No hx of proning    CV:   - Started Levo (0.04)  -  QTc 438  - Net positive fluid status   - Troponin: 56, will repeat Wed 4/1    GI:   - 3/29: Glucerna @45  - Pepcid BID  - D/c'ed rectal tube --> dc no diarrhea on 3/29    : Cr 0.99  - Will give 40 mg Lasix today (20mg 3/29 --> good UOP)  - Slighly hyponatremic  - IVL    Heme:   - LVX for dvt ppx    ID: COVID +  - On Hydroxychloroquine and Vit C(3/27-3/31)  - Procal 5.9 (0.54) --> was started on empiric vanc/cefepime (3/29)  - D/c Vanc today, continue cefepime      Bcx sent on 3/29  - UA + 3/29  - Order Ucx    Endo: DM2  - SSI      Lines: RIJ TLC (3/28), Right radial A line (3/28) 75F w/ DM2, HTN, HLD, hypothyroidism admitted 3/27 w/ shortness of breath, was hypoxic 86%. 5 days prior had general weakness, fatigue , general headache, body ache, poor appetite. 2 days PTA, developed fever 102.9F at home. On admission pt denied chest pain, abdominal pain, nausea, vomiting, diarrhea, constipation, urinary symptoms.  As per  , there is no history of travel within or outside of the [place], no known sick contact .  Patient went to a party about 2 weeks ago in [facility] with the family only and goes to [employer] as need and also drops her children to their school.    Neuro:   - Propofol, Dilaudid PRN, Tylenol  - No hx of paralysis     Respiratory:   - Vent: 400/14/14/40  - ABG 7.43/32/50/21 (on FIO2 of 30% --> increased to 40%)  - P/F: 205  - No hx of proning    CV:   - Started Levo (0.04)  -  QTc 438  - Net positive fluid status   - Troponin: 56, will repeat Wed 4/1    GI:   - 3/29: Glucerna @45  - Pepcid BID  - D/c'ed rectal tube --> dc no diarrhea on 3/29    : Cr 0.99  - Will give 40 mg Lasix today (20mg 3/29 --> good UOP)  - Slighly hyponatremic  - IVL    Heme:   - LVX for dvt ppx    ID: COVID +  - On Hydroxychloroquine and Vit C(3/27-3/31)  - Procal 5.9 (0.54) --> was started on empiric vanc/cefepime (3/29)  - D/c Vanc today, continue cefepime      Bcx sent on 3/29  - UA + 3/29  - Order Ucx    Endo: DM2  - SSI      Lines: RIJ TLC (3/28), Right radial A line (3/28)

## 2020-03-31 NOTE — PROGRESS NOTE ADULT - SUBJECTIVE AND OBJECTIVE BOX
HISTORY  75y Female with COVID-19(+) pneumonia requiring intubation.    24 HOUR EVENTS:    SUBJECTIVE/ROS: Due to intubation with sedation, subjective information was not able to be obtained from the patient. History was obtained, to the extent possible, from review of the chart and collateral sources of information.      NEURO  Exam: sedated, no acute distress  Meds: acetaminophen    Suspension .. 650 milliGRAM(s) Enteral Tube every 6 hours PRN Temp greater or equal to 38C (100.4F)  HYDROmorphone  Injectable 1 milliGRAM(s) IV Push every 2 hours PRN Ventilator dyssynchrony  oxyCODONE    Solution 5 milliGRAM(s) Oral every 4 hours  propofol Infusion 35 MICROgram(s)/kG/Min IV Continuous <Continuous>    Adequacy of sedation and pain control has been assessed and adjusted.      RESPIRATORY  RR: 17 (12 - 31)  SpO2: 93% (81% - 100%)  Mechanical Ventilation: Mode: AC/ CMV (Assist Control/ Continuous Mandatory Ventilation), RR (machine): 14, TV (machine): 400, FiO2: 40, PEEP: 14  Predicted Body Weight:  Tidal Volume/PBW ratio:  PaO2 to FiO2 ratio:  Extubation readiness assessed.  ABG - ( 30 Mar 2020 21:55 )  pH: 7.42  /  pCO2: 33    /  pO2: 66    / HCO3: 21    / Base Excess: -2.7  /  SaO2: 93      Lactate: x                CARDIOVASCULAR  HR: 83 (70 - 100)  ABP: 105/46 (95/41 - 179/57)  ABP(mean): 66 (58 - 95)  Cardiac Rhythm: sinus  Most recent QTc: 12 Lead ECG:   Ventricular Rate 76 BPM    Atrial Rate 76 BPM    P-R Interval 180 ms    QRS Duration 92 ms    Q-T Interval 390 ms    QTC Calculation(Bezet) 438 ms    P Axis 55 degrees    R Axis -3 degrees    T Axis 49 degrees    Diagnosis Line NORMAL SINUS RHYTHM  LOW VOLTAGE QRS      Confirmed by SANTOS OLSON MD ([id number]) on 3/29/2020 3:41:44 PM (03-29 @ 05:32)    Meds: norepinephrine Infusion (14.9 mL/Hr)        GI/NUTRITION  Diet: Diet, NPO with Tube Feed:   Tube Feeding Modality: Orogastric  Glucerna 1.2 Aj (GLUCERNARTH)  Total Volume for 24 Hours (mL): 1080  Continuous  Starting Tube Feed Rate mL per Hour: 45     Every hour  Until Goal Tube Feed Rate (mL per Hour): 45  Tube Feed Duration (in Hours): 24  Tube Feed Start Time: 18:30  No Carb Prosource (1pkg = 15gms Protein)     Qty per Day:  2 (03-30-20 @ 18:33)    Most recent bowel movement:  Meds/stress ulcer prophylaxis: ascorbic acid 500 milliGRAM(s) Oral daily  famotidine Injectable 20 milliGRAM(s) IV Push two times a day  potassium phosphate IVPB 30 milliMole(s) IV Intermittent once        GENITOURINARY  I&O's Detail    03-29 @ 07:01  -  03-30 @ 07:00  --------------------------------------------------------  IN:    Enteral Tube Flush: 300 mL    Glucerna 1.5: 310 mL    IV PiggyBack: 650 mL    propofol Infusion: 292.3 mL    Solution: 300 mL  Total IN: 1852.3 mL    OUT:    Indwelling Catheter - Urethral: 1145 mL  Total OUT: 1145 mL    Total NET: 707.3 mL      03-30 @ 07:01 - 03-31 @ 03:19  --------------------------------------------------------  IN:    Enteral Tube Flush: 100 mL    Glucerna 1.5: 745 mL    IV PiggyBack: 100 mL    norepinephrine Infusion: 171.4 mL    propofol Infusion: 466.4 mL  Total IN: 1582.8 mL    OUT:    Indwelling Catheter - Urethral: 1195 mL    Rectal Tube: 150 mL  Total OUT: 1345 mL    Total NET: 237.8 mL          03-31    133<L>  |  101  |  23  ----------------------------<  220<H>  3.6   |  16<L>  |  1.13    Ca    8.0<L>      31 Mar 2020 00:29  Phos  2.5     03-31  Mg     1.7     03-31    TPro  6.4  /  Alb  2.7<L>  /  TBili  0.3  /  DBili  x   /  AST  41<H>  /  ALT  22  /  AlkPhos  75  03-31    [x] Alamo catheter, indication: urine output monitoring in critically ill patient.  Meds: ascorbic acid 500 milliGRAM(s) Oral daily  potassium phosphate IVPB 30 milliMole(s) IV Intermittent once    Creatine Kinase, Serum: 402 (03-30-20 @ 01:52)        HEMATOLOGIC  Meds: enoxaparin Injectable 40 milliGRAM(s) SubCutaneous daily    VTE Prophylaxis:                         11.7   7.13  )-----------( 165      ( 31 Mar 2020 00:29 )             34.7     PT/INR - ( 30 Mar 2020 01:52 )   PT: 12.5 sec;   INR: 1.09 ratio         PTT - ( 30 Mar 2020 01:52 )  PTT:37.0 sec  [id number] @ 04:09        INFECTIOUS DISEASES  T(C): 37.7, Max: 38.4 (03-30-20 @ 08:00)  WBC Count: 7.13 (03-31-20 @ 00:29)  WBC Count: 4.33 (03-30-20 @ 01:53)    Recent Cultures:  Specimen Source: .Blood Blood-Peripheral, 03-29 @ 18:35; Results   No growth to date.; Gram Stain: --; Organism: --    Meds: cefepime   IVPB 2000 milliGRAM(s) IV Intermittent every 8 hours  hydroxychloroquine 200 milliGRAM(s) Oral two times a day  influenza   Vaccine 0.5 milliLiter(s) IntraMuscular once    Procalcitonin, Serum: 4.29 ng/mL (03-31-20 @ 00:29)        ENDOCRINE  202  166  168    Meds: atorvastatin 20 milliGRAM(s) Oral at bedtime  insulin lispro (HumaLOG) corrective regimen sliding scale   SubCutaneous every 6 hours  levothyroxine Injectable 50 MICROGram(s) IV Push at bedtime        ACCESS DEVICES:  [ ] Peripheral IV  [x] Central Venous Line	[ ] R	[ ] L	[ ] IJ	[ ] Fem	[ ] SC	Placed:   [x] Arterial Line		[ ] R	[ ] L	[ ] Fem	[ ] Rad	[ ] Ax	Placed:   [ ] Shiley HD Access             [ ] R [ ] L [ ] IJ  [ ] Fem     Placed:  [x] Urinary Catheter, Date Placed:   Necessity of urinary, arterial, and venous catheters discussed.      CODE STATUS:     IMAGING: HISTORY  75y Female with COVID-19(+) pneumonia requiring intubation.    24 HOUR EVENTS:     SUBJECTIVE/ROS: Due to intubation with sedation, subjective information was not able to be obtained from the patient. History was obtained, to the extent possible, from review of the chart and collateral sources of information.      NEURO  Exam: sedated, no acute distress  Meds: acetaminophen    Suspension .. 650 milliGRAM(s) Enteral Tube every 6 hours PRN Temp greater or equal to 38C (100.4F)  HYDROmorphone  Injectable 1 milliGRAM(s) IV Push every 2 hours PRN Ventilator dyssynchrony  oxyCODONE    Solution 5 milliGRAM(s) Oral every 4 hours  propofol Infusion 35 MICROgram(s)/kG/Min IV Continuous <Continuous>    Adequacy of sedation and pain control has been assessed and adjusted.      RESPIRATORY  RR: 17 (12 - 31)  SpO2: 93% (81% - 100%)  Mechanical Ventilation: Mode: AC/ CMV (Assist Control/ Continuous Mandatory Ventilation), RR (machine): 14, TV (machine): 400, FiO2: 40, PEEP: 14  Predicted Body Weight:  Tidal Volume/PBW ratio:  PaO2 to FiO2 ratio:  Extubation readiness assessed.  ABG - ( 30 Mar 2020 21:55 )  pH: 7.42  /  pCO2: 33    /  pO2: 66    / HCO3: 21    / Base Excess: -2.7  /  SaO2: 93      Lactate: x                CARDIOVASCULAR  HR: 83 (70 - 100)  ABP: 105/46 (95/41 - 179/57)  ABP(mean): 66 (58 - 95)  Cardiac Rhythm: sinus  Most recent QTc: 12 Lead ECG:   Ventricular Rate 76 BPM    Atrial Rate 76 BPM    P-R Interval 180 ms    QRS Duration 92 ms    Q-T Interval 390 ms    QTC Calculation(Bezet) 438 ms    P Axis 55 degrees    R Axis -3 degrees    T Axis 49 degrees    Diagnosis Line NORMAL SINUS RHYTHM  LOW VOLTAGE QRS      Confirmed by ASNTOS OLSON MD ([id number]) on 3/29/2020 3:41:44 PM (03-29 @ 05:32)    Meds: norepinephrine Infusion (14.9 mL/Hr)        GI/NUTRITION  Diet: Diet, NPO with Tube Feed:   Tube Feeding Modality: Orogastric  Glucerna 1.2 Aj (GLUCERNARTH)  Total Volume for 24 Hours (mL): 1080  Continuous  Starting Tube Feed Rate mL per Hour: 45     Every hour  Until Goal Tube Feed Rate (mL per Hour): 45  Tube Feed Duration (in Hours): 24  Tube Feed Start Time: 18:30  No Carb Prosource (1pkg = 15gms Protein)     Qty per Day:  2 (03-30-20 @ 18:33)    Most recent bowel movement:  Meds/stress ulcer prophylaxis: ascorbic acid 500 milliGRAM(s) Oral daily  famotidine Injectable 20 milliGRAM(s) IV Push two times a day  potassium phosphate IVPB 30 milliMole(s) IV Intermittent once        GENITOURINARY  I&O's Detail    03-29 @ 07:01  -  03-30 @ 07:00  --------------------------------------------------------  IN:    Enteral Tube Flush: 300 mL    Glucerna 1.5: 310 mL    IV PiggyBack: 650 mL    propofol Infusion: 292.3 mL    Solution: 300 mL  Total IN: 1852.3 mL    OUT:    Indwelling Catheter - Urethral: 1145 mL  Total OUT: 1145 mL    Total NET: 707.3 mL      03-30 @ 07:01 - 03-31 @ 03:19  --------------------------------------------------------  IN:    Enteral Tube Flush: 100 mL    Glucerna 1.5: 745 mL    IV PiggyBack: 100 mL    norepinephrine Infusion: 171.4 mL    propofol Infusion: 466.4 mL  Total IN: 1582.8 mL    OUT:    Indwelling Catheter - Urethral: 1195 mL    Rectal Tube: 150 mL  Total OUT: 1345 mL    Total NET: 237.8 mL          03-31    133<L>  |  101  |  23  ----------------------------<  220<H>  3.6   |  16<L>  |  1.13    Ca    8.0<L>      31 Mar 2020 00:29  Phos  2.5     03-31  Mg     1.7     03-31    TPro  6.4  /  Alb  2.7<L>  /  TBili  0.3  /  DBili  x   /  AST  41<H>  /  ALT  22  /  AlkPhos  75  03-31    [x] [facility] catheter, indication: urine output monitoring in critically ill patient.  Meds: ascorbic acid 500 milliGRAM(s) Oral daily  potassium phosphate IVPB 30 milliMole(s) IV Intermittent once    Creatine Kinase, Serum: 402 (03-30-20 @ 01:52)        HEMATOLOGIC  Meds: enoxaparin Injectable 40 milliGRAM(s) SubCutaneous daily    VTE Prophylaxis:                         11.7   7.13  )-----------( 165      ( 31 Mar 2020 00:29 )             34.7     PT/INR - ( 30 Mar 2020 01:52 )   PT: 12.5 sec;   INR: 1.09 ratio         PTT - ( 30 Mar 2020 01:52 )  PTT:37.0 sec  [id number] @ 04:09        INFECTIOUS DISEASES  T(C): 37.7, Max: 38.4 (03-30-20 @ 08:00)  WBC Count: 7.13 (03-31-20 @ 00:29)  WBC Count: 4.33 (03-30-20 @ 01:53)    Recent Cultures:  Specimen Source: .Blood Blood-Peripheral, 03-29 @ 18:35; Results   No growth to date.; Gram Stain: --; Organism: --    Meds: cefepime   IVPB 2000 milliGRAM(s) IV Intermittent every 8 hours  hydroxychloroquine 200 milliGRAM(s) Oral two times a day  influenza   Vaccine 0.5 milliLiter(s) IntraMuscular once    Procalcitonin, Serum: 4.29 ng/mL (03-31-20 @ 00:29)        ENDOCRINE  202  166  168    Meds: atorvastatin 20 milliGRAM(s) Oral at bedtime  insulin lispro (HumaLOG) corrective regimen sliding scale   SubCutaneous every 6 hours  levothyroxine Injectable 50 MICROGram(s) IV Push at bedtime        ACCESS DEVICES:  [ ] Peripheral IV  [x] Central Venous Line	[ ] R	[ ] L	[ ] IJ	[ ] Fem	[ ] SC	Placed:   [x] Arterial Line		[ ] R	[ ] L	[ ] Fem	[ ] Rad	[ ] Ax	Placed:   [ ] Shiley HD Access             [ ] R [ ] L [ ] IJ  [ ] Fem     Placed:  [x] Urinary Catheter, Date Placed:   Necessity of urinary, arterial, and venous catheters discussed.      CODE STATUS:     IMAGING: HISTORY  75y Female with COVID-19(+) pneumonia requiring intubation.    24 HOUR EVENTS: Stable off of pressors, awaiting urine culture results, Vancomycin D/C'd, patient comes from home.    SUBJECTIVE/ROS: Due to intubation with sedation, subjective information was not able to be obtained from the patient. History was obtained, to the extent possible, from review of the chart and collateral sources of information.      NEURO  Exam: sedated, no acute distress  Meds: acetaminophen    Suspension .. 650 milliGRAM(s) Enteral Tube every 6 hours PRN Temp greater or equal to 38C (100.4F)  HYDROmorphone  Injectable 1 milliGRAM(s) IV Push every 2 hours PRN Ventilator dyssynchrony  oxyCODONE    Solution 5 milliGRAM(s) Oral every 4 hours  propofol Infusion 35 MICROgram(s)/kG/Min IV Continuous <Continuous>    Adequacy of sedation and pain control has been assessed and adjusted.      RESPIRATORY  RR: 17 (12 - 31)  SpO2: 93% (81% - 100%)  Mechanical Ventilation: Mode: AC/ CMV (Assist Control/ Continuous Mandatory Ventilation), RR (machine): 14, TV (machine): 400, FiO2: 40, PEEP: 14  Predicted Body Weight: 50  Tidal Volume/PBW ratio: 8   PaO2 to FiO2 ratio: 205  Extubation readiness assessed.  ABG - ( 30 Mar 2020 21:55 )  pH: 7.42  /  pCO2: 33    /  pO2: 66    / HCO3: 21    / Base Excess: -2.7  /  SaO2: 93      Lactate: x                CARDIOVASCULAR  HR: 83 (70 - 100)  ABP: 105/46 (95/41 - 179/57)  ABP(mean): 66 (58 - 95)  Cardiac Rhythm: sinus  Most recent QTc: 12 Lead ECG:   Ventricular Rate 76 BPM    Atrial Rate 76 BPM    P-R Interval 180 ms    QRS Duration 92 ms    Q-T Interval 390 ms    QTC Calculation(Bezet) 438 ms    P Axis 55 degrees    R Axis -3 degrees    T Axis 49 degrees    Diagnosis Line NORMAL SINUS RHYTHM  LOW VOLTAGE QRS      Confirmed by SANTOS OLSON MD ([id number]) on 3/29/2020 3:41:44 PM (03-29 @ 05:32)    Meds: norepinephrine Infusion (14.9 mL/Hr), hydroxychloroquine 200 mg oral via tube 2x/day x7 doses        GI/NUTRITION  Diet: Diet, NPO with Tube Feed:   Tube Feeding Modality: Orogastric  Glucerna 1.2 Aj (GLUCERNARTH)  Total Volume for 24 Hours (mL): 1080  Continuous  Starting Tube Feed Rate mL per Hour: 45     Every hour  Until Goal Tube Feed Rate (mL per Hour): 45  Tube Feed Duration (in Hours): 24  Tube Feed Start Time: 18:30  No Carb Prosource (1pkg = 15gms Protein)     Qty per Day:  2 (03-30-20 @ 18:33)    Most recent bowel movement:  Meds/stress ulcer prophylaxis: ascorbic acid 500 milliGRAM(s) Oral daily  famotidine Injectable 20 milliGRAM(s) IV Push two times a day  potassium phosphate IVPB 30 milliMole(s) IV Intermittent once        GENITOURINARY  I&O's Detail    03-29 @ 07:01  -  03-30 @ 07:00  --------------------------------------------------------  IN:    Enteral Tube Flush: 300 mL    Glucerna 1.5: 310 mL    IV PiggyBack: 650 mL    propofol Infusion: 292.3 mL    Solution: 300 mL  Total IN: 1852.3 mL    OUT:    Indwelling Catheter - Urethral: 1145 mL  Total OUT: 1145 mL    Total NET: 707.3 mL      03-30 @ 07:01  -  03-31 @ 03:19  --------------------------------------------------------  IN:    Enteral Tube Flush: 100 mL    Glucerna 1.5: 745 mL    IV PiggyBack: 100 mL    norepinephrine Infusion: 171.4 mL    propofol Infusion: 466.4 mL  Total IN: 1582.8 mL    OUT:    Indwelling Catheter - Urethral: 1195 mL    Rectal Tube: 150 mL  Total OUT: 1345 mL    Total NET: 237.8 mL          03-31    133<L>  |  101  |  23  ----------------------------<  220<H>  3.6   |  16<L>  |  1.13    Ca    8.0<L>      31 Mar 2020 00:29  Phos  2.5     03-31  Mg     1.7     03-31    TPro  6.4  /  Alb  2.7<L>  /  TBili  0.3  /  DBili  x   /  AST  41<H>  /  ALT  22  /  AlkPhos  75  03-31    [x] [facility] catheter, indication: urine output monitoring in critically ill patient.  Meds: ascorbic acid 500 milliGRAM(s) Oral daily  potassium phosphate IVPB 30 milliMole(s) IV Intermittent once    Creatine Kinase, Serum: 402 (03-30-20 @ 01:52)        HEMATOLOGIC  Meds: enoxaparin Injectable 40 milliGRAM(s) SubCutaneous daily    VTE Prophylaxis:                         11.7   7.13  )-----------( 165      ( 31 Mar 2020 00:29 )             34.7     PT/INR - ( 30 Mar 2020 01:52 )   PT: 12.5 sec;   INR: 1.09 ratio         PTT - ( 30 Mar 2020 01:52 )  PTT:37.0 sec  [id number] @ 04:09        INFECTIOUS DISEASES  T(C): 37.7, Max: 38.4 (03-30-20 @ 08:00)  WBC Count: 7.13 (03-31-20 @ 00:29)  WBC Count: 4.33 (03-30-20 @ 01:53)    Recent Cultures:  Specimen Source: .Blood Blood-Peripheral, 03-29 @ 18:35; Results   No growth to date.; Gram Stain: --; Organism: --    Meds: cefepime   IVPB 2000 milliGRAM(s) IV Intermittent every 8 hours  hydroxychloroquine 200 milliGRAM(s) Oral two times a day  influenza   Vaccine 0.5 milliLiter(s) IntraMuscular once    Procalcitonin, Serum: 4.29 ng/mL (03-31-20 @ 00:29)        ENDOCRINE  202  166  168    Meds: atorvastatin 20 milliGRAM(s) Oral at bedtime  insulin lispro (HumaLOG) corrective regimen sliding scale   SubCutaneous every 6 hours  levothyroxine Injectable 50 MICROGram(s) IV Push at bedtime        ACCESS DEVICES:  [ ] Peripheral IV  [x] Central Venous Line	[x] R	[ ] L	[x] IJ	[ ] Fem	[ ] SC	Placed: 3/28/2020  [x] Arterial Line		[x] R	[ ] L	[ ] Fem	[x] Rad	[ ] Ax	Placed: 3/28/2020  [ ] [facility] HD Access             [ ] R [ ] L [ ] IJ  [ ] Fem     Placed:  [x] Urinary Catheter, Date Placed:   Necessity of urinary, arterial, and venous catheters discussed.      CODE STATUS:     IMAGING:

## 2020-03-31 NOTE — PROGRESS NOTE ADULT - ATTENDING COMMENTS
75F with DM, HTN, HLD, hypothyroid admitted with acute hypoxemic respiratory failure 2/2 COVID    Neuro: continue propofol, dilaudid prn for sedation and vent synchrony.   Resp: Acute hypoxemic respiratory failure 2/2 covid pna, with moderate ARDS - on PEEP 16/FIO2 60%; PF ratio 162. Will wean PEEP/FIO2 according to ARDSnet high PEEP table.   CV: hypotensive likely 2/2 sedation, on levo 0.1. Will maintain MAP>65.   GI: Will continue TFs.   : Net + 800cc, will give lasix 20.  Heme: On lovenox for VTE prophylaxis.  ID: COVID + now on hydroxychloroquine course, will monitor QTc with daily EKG. On cefepime for + UA, will send U cx.   Endo: Will continue SSI for glycemic control.   Lines: R radial a line, RIJ TLC,, wolff    Patient seen and discussed with Dr. Beth.    GRISELDA Yen  Surgical Critical Care Fellow

## 2020-04-01 NOTE — PROGRESS NOTE ADULT - ASSESSMENT
Assessment and Plan:   · Assessment		    75F w/ DM2, HTN, HLD, hypothyroidism admitted 3/27 w/ shortness of breath, was hypoxic 86%. 5 days prior had general weakness, fatigue , general headache, body ache, poor appetite. 2 days PTA, developed fever 102.9F at home. On admission pt denied chest pain, abdominal pain, nausea, vomiting, diarrhea, constipation, urinary symptoms.  As per  , there is no history of travel within or outside of the [place], no known sick contact .  Patient went to a party about 2 weeks ago in [place] with the family only and goes to [employer] as need and also drops her children to their school.    Neuro:   - Propofol, Dilaudid PRN, Tylenol  - No hx of paralysis     Respiratory:   - Vent: 400/14/16/50  - ABG 7.43/32/50/21 (on FIO2 of 30% --> increased to 40%)  - P/F:   - No hx of proning    CV:   - Started Levo (0.04)  - Net positive fluid status       GI:   - 3/29: Glucerna @45  - Pepcid BID  - D/c'ed rectal tube --> dc no diarrhea on 3/29    : Cr 0.99  - Will give 40 mg Lasix today (20mg 3/29 --> good UOP)      Heme:   - LVX for dvt ppx    ID: COVID +  - On Hydroxychloroquine and Vit C(3/27-3/31)  - Procal 5.9 (0.54) --> was started on empiric vanc/cefepime (3/29)  - D/dex Vanc  - c/w  cefepime      Bcx sent on 3/29  - UA + 3/29  - Order Ucx    Endo: DM2  - SSI      Lines: RIJ TLC (3/28), Right radial A line (3/28) Assessment and Plan:   · Assessment		    75F w/ DM2, HTN, HLD, hypothyroidism admitted 3/27 w/ shortness of breath, was hypoxic 86%. 5 days prior had general weakness, fatigue , general headache, body ache, poor appetite. 2 days PTA, developed fever 102.9F at home. On admission pt denied chest pain, abdominal pain, nausea, vomiting, diarrhea, constipation, urinary symptoms.  As per  , there is no history of travel within or outside of the [place], no known sick contact .  Patient went to a party about 2 weeks ago in [place] with the family only and goes to [employer] as need and also drops her children to their school.    Neuro:   - Propofol, Dilaudid PRN, Tylenol, will attempt sedation vacation   - No hx of paralysis     Respiratory:   - Vent: 400/14/16/50 changed to volume AC and decreasing tidal volume to 350.  - ABG 7.43/32/50/21 (on FIO2 of 30% --> increased to 40%)  - P/F:   - No hx of proning    CV:   - Started Levo (0.04)  - Net positive fluid status       GI:   - 3/29: Glucerna @45 is goal, will titrate up and check residuals.  - Pepcid BID  - D/c'ed rectal tube --> dc no diarrhea on 3/29    : Cr 0.99  - Will give 40 mg Lasix today (20mg 3/29 --> good UOP)      Heme:   - LVX for dvt ppx    ID: COVID +  - On Hydroxychloroquine and Vit C(3/27-3/31)  - Procal 5.9 (0.54) --> was started on empiric vanc/cefepime (3/29)  - D/dex Vanc  - c/w  cefepime  but will redose for renal failure to 2 g q 12hr     Bcx sent on 3/29  - UA + 3/29  - Order Ucx    Endo: DM2  - SSI      Lines: RIJ TLC (3/28), Right radial A line (3/28)

## 2020-04-01 NOTE — PROGRESS NOTE ADULT - SUBJECTIVE AND OBJECTIVE BOX
HISTORY  75y Female with COVID-19(+) pneumonia requiring intubation.    24 HOUR EVENTS:    SUBJECTIVE/ROS: Due to intubation with sedation, subjective information was not able to be obtained from the patient. History was obtained, to the extent possible, from review of the chart and collateral sources of information.      NEURO  Exam: sedated, no acute distress  Meds: acetaminophen    Suspension .. 650 milliGRAM(s) Enteral Tube every 6 hours PRN Temp greater or equal to 38C (100.4F)  HYDROmorphone  Injectable 1 milliGRAM(s) IV Push every 2 hours PRN Ventilator dyssynchrony  oxyCODONE    Solution 5 milliGRAM(s) Enteral Tube every 4 hours  propofol Infusion 35 MICROgram(s)/kG/Min IV Continuous <Continuous>    Adequacy of sedation and pain control has been assessed and adjusted.      RESPIRATORY  RR: 11 (11 - 17)  SpO2: 98% (93% - 98%)  Exam: without dissynchrony  Mechanical Ventilation: Mode: AC/ CMV (Assist Control/ Continuous Mandatory Ventilation), RR (machine): 14, TV (machine): 400, FiO2: 50, PEEP: 16  Predicted Body Weight:  Tidal Volume/PBW ratio:  PaO2 to FiO2 ratio: 274  Extubation readiness assessed.  ABG - ( 01 Apr 2020 01:30 )  pH: 7.39  /  pCO2: 31    /  pO2: 137   / HCO3: 18    / Base Excess: -5.1  /  SaO2: 99      Lactate: x                CARDIOVASCULAR  HR: 83 (71 - 91)  ABP: 129/42 (106/47 - 146/51)  ABP(mean): 65 (62 - 85)  Cardiac Rhythm: sinus  Most recent QTc: 12 Lead ECG:   Ventricular Rate 76 BPM    Atrial Rate 76 BPM    P-R Interval 180 ms    QRS Duration 92 ms    Q-T Interval 390 ms    QTC Calculation(Bezet) 438 ms    P Axis 55 degrees    R Axis -3 degrees    T Axis 49 degrees    Diagnosis Line NORMAL SINUS RHYTHM  LOW VOLTAGE QRS      Confirmed by SANTOS OLSON MD ([id number]) on 3/29/2020 3:41:44 PM (03-29 @ 05:32)    Meds: norepinephrine Infusion (14.9 mL/Hr)        GI/NUTRITION  Diet: Diet, NPO with Tube Feed:   Tube Feeding Modality: Orogastric  Glucerna 1.2 Aj (GLUCERNARTH)  Total Volume for 24 Hours (mL): 1080  Continuous  Starting Tube Feed Rate mL per Hour: 45     Every hour  Until Goal Tube Feed Rate (mL per Hour): 45  Tube Feed Duration (in Hours): 24  Tube Feed Start Time: 18:30  No Carb Prosource (1pkg = 15gms Protein)     Qty per Day:  2 (03-30-20 @ 18:33)    Most recent bowel movement:  Meds/stress ulcer prophylaxis: ascorbic acid 500 milliGRAM(s) Oral daily  famotidine Injectable 20 milliGRAM(s) IV Push two times a day        GENITOURINARY  I&O's Detail    03-30 @ 07:01  -  03-31 @ 07:00  --------------------------------------------------------  IN:    Enteral Tube Flush: 250 mL    Glucerna 1.5: 925 mL    IV PiggyBack: 150 mL    norepinephrine Infusion: 231 mL    propofol Infusion: 547.2 mL    Solution: 249.9 mL  Total IN: 2353.1 mL    OUT:    Indwelling Catheter - Urethral: 1310 mL    Rectal Tube: 150 mL  Total OUT: 1460 mL    Total NET: 893.1 mL      03-31 @ 07:01  -  04-01 @ 03:00  --------------------------------------------------------  IN:    Enteral Tube Flush: 120 mL    Glucerna 1.5: 630 mL    IV PiggyBack: 50 mL    norepinephrine Infusion: 199.4 mL    propofol Infusion: 394.1 mL  Total IN: 1393.5 mL    OUT:    Indwelling Catheter - Urethral: 700 mL  Total OUT: 700 mL    Total NET: 693.5 mL          04-01    137  |  106  |  29<H>  ----------------------------<  164<H>  4.4   |  17<L>  |  1.50<H>    Ca    8.0<L>      01 Apr 2020 01:47  Phos  3.6     04-01  Mg     2.5     04-01    TPro  6.4  /  Alb  2.6<L>  /  TBili  0.3  /  DBili  x   /  AST  38  /  ALT  25  /  AlkPhos  87  04-01    [x] Alamo catheter, indication: urine output monitoring in critically ill patient.  Meds: ascorbic acid 500 milliGRAM(s) Oral daily    Creatine Kinase, Serum: 130 (04-01-20 @ 01:47)        HEMATOLOGIC  Meds/VTE prophylaxis: enoxaparin Injectable 40 milliGRAM(s) SubCutaneous daily                          10.4   8.84  )-----------( 167      ( 01 Apr 2020 01:47 )             31.4     PT/INR - ( 01 Apr 2020 01:47 )   PT: 14.3 sec;   INR: 1.25 ratio         PTT - ( 01 Apr 2020 01:47 )  PTT:34.3 sec  [id number] @ 01:47        INFECTIOUS DISEASES  T(C): 37.8, Max: 38.4 (03-31-20 @ 04:00)  WBC Count: 8.84 (04-01-20 @ 01:47)  WBC Count: 7.13 (03-31-20 @ 00:29)    Recent Cultures:  Specimen Source: .Urine Catheterized, 03-31 @ 00:36; Results   No growth; Gram Stain: --; Organism: --  Specimen Source: .Blood Blood-Peripheral, 03-29 @ 18:35; Results   No growth to date.; Gram Stain: --; Organism: --    Meds: cefepime   IVPB 2000 milliGRAM(s) IV Intermittent every 8 hours  hydroxychloroquine 200 milliGRAM(s) Oral two times a day  influenza   Vaccine 0.5 milliLiter(s) IntraMuscular once    Procalcitonin, Serum: 5.55 ng/mL (04-01-20 @ 01:47)        ENDOCRINE  Fingersticks: 179, 198, 191, 202  Meds: atorvastatin 20 milliGRAM(s) Oral at bedtime  insulin lispro (HumaLOG) corrective regimen sliding scale   SubCutaneous every 6 hours  levothyroxine Injectable 50 MICROGram(s) IV Push at bedtime        ACCESS DEVICES:  [ ] Peripheral IV  [x] Central Venous Line	[ ] R	[ ] L	[ ] IJ	[ ] Fem	[ ] SC	Placed:   [x] Arterial Line		[ ] R	[ ] L	[ ] Fem	[ ] Rad	[ ] Ax	Placed:   [ ] Shiley HD Access             [ ] R [ ] L [ ] IJ  [ ] Fem     Placed:  [x] Urinary Catheter, Date Placed:   Necessity of urinary, arterial, and venous catheters discussed.      CODE STATUS:     IMAGING:

## 2020-04-01 NOTE — PROGRESS NOTE ADULT - ATTENDING COMMENTS
COVID ICU Attending  Patient seen and discussed with ICU team  On sedation / neuromuscular paralysis procotol  In severe COVID induced ARDS  On ARDS.net high PEEP protocol  In COVID sepsis induced acute renal failure   On DVT prophylaxis / stress ulcer prophylaxis    Prognosis very guarded

## 2020-04-02 NOTE — PROCEDURE NOTE - NSINDICATIONS_GEN_A_CORE
critical illness/emergency venous access
critical patient/arterial puncture to obtain ABG's/monitoring purposes/blood sampling
dialysis/CRRT

## 2020-04-02 NOTE — CONSULT NOTE ADULT - SUBJECTIVE AND OBJECTIVE BOX
[facility] Division of Kidney Diseases & Hypertension  INITIAL CONSULT NOTE  --------------------------------------------------------------------------------  HPI:  75F pmhx htn, hld, dm, hypothyroid pw ems for shortness of breath. per EMS hypoxic 86% on scene.  As per  patient started not feeling well about 5 days ago with general weakness, fatigue , general headache, body ache, poor appetite and 2 days ago , she developped fever up to 102.9F at home and she took 2 tylenol with temporary abatement of the fever which then recurred with temp of 102F as per  and again she took tylenol.  As per  , no cough, no GI complaints, no chest pain .  NO actual SOB but this Am ,  states that patient was not breathing right and they call an ambulance service who checked her SPO2 and was found to have SPO2 of 86% at home.     During hospital course developed worsening hypoxia and intubated.   Initially appears to have preserved renal function  Now last 2 days worsening renal function and with oliguria    PAST HISTORY  --------------------------------------------------------------------------------  PAST MEDICAL & SURGICAL HISTORY:  Hypothyroidism  Dyslipidemia  Type 2 diabetes mellitus  Essential hypertension  H/O foot surgery  History of cataract surgery    FAMILY HISTORY:    PAST SOCIAL HISTORY:    ALLERGIES & MEDICATIONS  --------------------------------------------------------------------------------  Allergies    lidocaine (Other)    Intolerances      Standing Inpatient Medications  atorvastatin 20 milliGRAM(s) Oral at bedtime  buMETAnide Infusion 4 mG/Hr IV Continuous <Continuous>  chlorhexidine 0.12% Liquid 15 milliLiter(s) Oral Mucosa every 12 hours  cisatracurium Infusion 1 MICROgram(s)/kG/Min IV Continuous <Continuous>  cisatracurium Injectable 10 milliGRAM(s) IV Push once  dextrose 50% Injectable 50 milliLiter(s) IV Push once  enoxaparin Injectable 30 milliGRAM(s) SubCutaneous daily  famotidine Injectable 20 milliGRAM(s) IV Push daily  HYDROmorphone Infusion 2 mG/Hr IV Continuous <Continuous>  influenza   Vaccine 0.5 milliLiter(s) IntraMuscular once  insulin lispro (HumaLOG) corrective regimen sliding scale   SubCutaneous every 6 hours  insulin regular  human recombinant. 10 Unit(s) IV Push once  ketamine Infusion 0.126 mG/kG/Hr IV Continuous <Continuous>  levothyroxine Injectable 50 MICROGram(s) IV Push at bedtime  norepinephrine Infusion 0.1 MICROgram(s)/kG/Min IV Continuous <Continuous>  petrolatum Ophthalmic Ointment 1 Application(s) Both EYES two times a day  propofol Infusion 35 MICROgram(s)/kG/Min IV Continuous <Continuous>  sodium zirconium cyclosilicate 10 Gram(s) Oral once    PRN Inpatient Medications  acetaminophen    Suspension .. 650 milliGRAM(s) Enteral Tube every 6 hours PRN      REVIEW OF SYSTEMS  --------------------------------------------------------------------------------    All other systems were reviewed and are negative, except as noted.    VITALS/PHYSICAL EXAM  --------------------------------------------------------------------------------  T(C): 36.8 (04-02-20 @ 12:00), Max: 37.2 (04-02-20 @ 04:00)  HR: 107 (04-02-20 @ 15:24) (77 - 113)  BP: --  RR: 18 (04-02-20 @ 13:00) (11 - 18)  SpO2: 94% (04-02-20 @ 15:24) (89% - 100%)  Wt(kg): --        04-01-20 @ 07:01  -  04-02-20 @ 07:00  --------------------------------------------------------  IN: 1839.7 mL / OUT: 670 mL / NET: 1169.7 mL    04-02-20 @ 07:01  -  04-02-20 @ 15:44  --------------------------------------------------------  IN: 572.9 mL / OUT: 65 mL / NET: 507.9 mL        LABS/STUDIES  --------------------------------------------------------------------------------              10.4   12.26 >-----------<  207      [04-02-20 @ 00:20]              31.7     137  |  103  |  56  ----------------------------<  183      [04-02-20 @ 13:02]  5.7   |  17  |  3.14        Ca     8.6     [04-02-20 @ 13:02]      Mg     2.7     [04-02-20 @ 13:02]      Phos  8.1     [04-02-20 @ 13:02]    TPro  6.6  /  Alb  2.3  /  TBili  0.3  /  DBili  x   /  AST  31  /  ALT  22  /  AlkPhos  130  [04-02-20 @ 13:02]    PT/INR: PT 14.3 , INR 1.25       [04-01-20 @ 01:47]  PTT: 34.3       [04-01-20 @ 01:47]          [04-01-20 @ 01:47]        [04-01-20 @ 01:47]    Creatinine Trend:  SCr 3.14 [04-02 @ 13:02]  SCr 2.32 [04-02 @ 00:20]  SCr 1.97 [04-01 @ 13:40]  SCr 1.50 [04-01 @ 01:47]  SCr 1.13 [03-31 @ 00:29]    Urinalysis - [03-29-20 @ 15:04]      Color Yellow / Appearance Slightly Turbid / SG 1.029 / pH 6.0      Gluc Negative / Ketone Negative  / Bili Negative / Urobili Negative       Blood Moderate / Protein 30 mg/dL / Leuk Est Large / Nitrite Negative      RBC 43 / WBC 74 / Hyaline 30 / Gran 2 / Sq Epi  / Non Sq Epi 2 / Bacteria Many      Ferritin 2743      [04-01-20 @ 07:22]  HbA1c 6.3      [03-28-20 @ 12:44]  TSH 1.65      [03-27-20 @ 16:52]  Lipid: chol --, , HDL --, LDL --      [04-01-20 @ 01:47]

## 2020-04-02 NOTE — PROCEDURE NOTE - NSPROCSEDATIONNOT_GEN_ALL_CORE
Yes
No
Yes
[Awake] : awake
[Alert] : alert
[Acute Distress] : no acute distress
[LAD] : no lymphadenopathy
[Thyroid Nodule] : no thyroid nodule
[Goiter] : no goiter
[Mass] : no breast mass
[Nipple Discharge] : no nipple discharge
[Soft] : soft
[Axillary LAD] : no axillary lymphadenopathy
[Distended] : not distended
[Tender] : non tender
[H/Smegaly] : no hepatosplenomegaly
[Oriented x3] : oriented to person, place, and time
[Flat Affect] : affect not flat
[Depressed Mood] : not depressed
[Normal] : cervix
[Uterine Adnexae] : were not tender and not enlarged
[Absent] : absent
[RRR, No Murmurs] : RRR, no murmurs
[No Bleeding] : there was no active vaginal bleeding
[CTAB] : CTAB

## 2020-04-02 NOTE — PROCEDURE NOTE - NSPOSTPRCRAD_GEN_A_CORE
post-procedure radiography performed
post-procedure radiography performed/no pneumothorax/central line located in the superior vena cava

## 2020-04-02 NOTE — CONSULT NOTE ADULT - ATTENDING COMMENTS
After hours call the on call fellow/house nephrology team  Dr Lobo and Dr Spaulding to see starting 4/3/20    Julianne Mendez MD  Off: [phone]  Cell: [phone]

## 2020-04-02 NOTE — PROGRESS NOTE ADULT - ATTENDING COMMENTS
On Propofol, Dilaudid, Ketamine, Propofol for sedation per COVID protocol  high PEEP ARDS net strategy.  PEEP of 8 60% FIO2 which is up from this morning  is Now paralyzed with Nimbex for ventilator dysynchrony  Creatinine up now to 3.14.  Got Bumex 4 mg per hour as discussed with renal team   metolazone 5 mg will monitor to see if gets response - if no response will anticipate need for CRRT  potassium 5.7 and PO4 8  Off antibiotics  45 minutes of critical care management applied

## 2020-04-02 NOTE — PROGRESS NOTE ADULT - ASSESSMENT
5F w/ DM2, HTN, HLD, hypothyroidism admitted 3/27 w/ shortness of breath, was hypoxic 86%. 5 days prior had general weakness, fatigue , general headache, body ache, poor appetite. 2 days PTA, developed fever 102.9F at home. On admission pt denied chest pain, abdominal pain, nausea, vomiting, diarrhea, constipation, urinary symptoms.  As per  , there is no history of travel within or outside of the [place], no known sick contact .  Patient went to a party about 2 weeks ago in [facility] with the family only and goes to [employer] as need and also drops her children to their school.    Neuro:   - Propofol, Dilaudid PRN, Tylenol, will attempt sedation vacation   - No hx of paralysis     Respiratory:   - Vent: 400/14/16/50 changed to volume AC and decreasing tidal volume to 350.  - ABG 7.43/32/50/21 (on FIO2 of 30% --> increased to 40%)  - P/F:   - No hx of proning    CV:   - Started Levo (0.04)  - Net positive fluid status       GI:   - 3/29: Glucerna @45 is goal, will titrate up and check residuals.  - Pepcid BID  - D/c'ed rectal tube --> dc no diarrhea on 3/29    : Cr 0.99  - Will give 40 mg Lasix today (20mg 3/29 --> good UOP)      Heme:   - LVX for dvt ppx    ID: COVID +  - On Hydroxychloroquine and Vit C(3/27-3/31)  - Procal 5.9 (0.54) --> was started on empiric vanc/cefepime (3/29)  - D/dex Vanc  - c/w  cefepime  but will redose for renal failure to 2 g q 12hr     Bcx sent on 3/29  - UA + 3/29  - Order Ucx    Endo: DM2  - SSI      Lines: RIJ TLC (3/28), Right radial A line (3/28) 5F w/ DM2, HTN, HLD, hypothyroidism admitted 3/27 w/ shortness of breath, was hypoxic 86%. 5 days prior had general weakness, fatigue , general headache, body ache, poor appetite. 2 days PTA, developed fever 102.9F at home. On admission pt denied chest pain, abdominal pain, nausea, vomiting, diarrhea, constipation, urinary symptoms.  As per  , there is no history of travel within or outside of the , no known sick contact .  Patient went to a party about 2 weeks ago in [facility] with the family only and goes to [employer] as need and also drops her children to their school.    Neuro:   - Propofol, Dilaudid PRN, Tylenol, will attempt sedation vacation   - No hx of paralysis     Respiratory:   - Vent: AC//14/50/14  - P/F:   - No hx of proning    CV:   - Started Levo (0.04)  - Net positive fluid status       GI:   - 3/29: Glucerna @45 is goal, will titrate up and check residuals.  - Pepcid BID  - D/c'ed rectal tube --> dc no diarrhea on 3/29    : Cr 0.99  - Will give 40 mg Lasix today (20mg 3/29 --> good UOP)      Heme:   - LVX for dvt ppx    ID: COVID +  - On Hydroxychloroquine and Vit C(3/27-3/31)  - Procal 5.9 (0.54) --> was started on empiric vanc/cefepime (3/29)  - D/dex Vanc  - c/w  cefepime  but will redose for renal failure to 2 g q 12hr     Bcx sent on 3/29  - UA + 3/29  - Order Ucx    Endo: DM2  - SSI      Lines: RIJ TLC (3/28), Right radial A line (3/28)

## 2020-04-02 NOTE — CONSULT NOTE ADULT - PROBLEM SELECTOR RECOMMENDATION 9
YELITZA ATN oliguria in setting of COVID 19  For now- BUMEX gtt 4mg/hr   Can add Zaroxyln 10mg  If no response may consider CVVHD depending on clinical condition

## 2020-04-02 NOTE — CONSULT NOTE ADULT - ASSESSMENT
74 yo female with DM2, HTN, HLD, hypothyroidism admitted 3/27 w/ shortness of breath, was hypoxic 86%. 5 days prior had general weakness, fatigue , general headache, body ache, poor appetite. 2 days PTA, developed fever 102.9F at home. Now intubated hypoxic resp failure  COVID + respiratory failure now YELITZA ATN likely in setting of COVID19 infection

## 2020-04-02 NOTE — PROCEDURE NOTE - NSPROCDETAILS_GEN_ALL_CORE
sterile technique, catheter placed/ultrasound guidance/guidewire recovered/lumen(s) aspirated and flushed/sterile dressing applied
connected to a pressurized flush line/location identified, draped/prepped, sterile technique used, needle inserted/introduced/positive blood return obtained via catheter/sutured in place/Seldinger technique
sterile technique, catheter placed/guidewire recovered/lumen(s) aspirated and flushed/ultrasound guidance/sterile dressing applied

## 2020-04-02 NOTE — CHART NOTE - NSCHARTNOTEFT_GEN_A_CORE
Nephrology contacted Dr. Divine Andrade (Pt's daughter, making medical decision, as per pt's spouse at . Explained to Dr. Haskins, renal failure has worsened and now have hyperkelamia with Ph of 7, therefore will need RRT, risk and benefits explained, all questions answered, she agreed to start CRRT/CVVHDF.

## 2020-04-02 NOTE — PROGRESS NOTE ADULT - SUBJECTIVE AND OBJECTIVE BOX
HISTORY  75y Female with COVID-19(+) pneumonia requiring intubation.    24 HOUR EVENTS:    SUBJECTIVE/ROS: Due to intubation with sedation, subjective information was not able to be obtained from the patient. History was obtained, to the extent possible, from review of the chart and collateral sources of information.      NEURO  Exam: sedated, no acute distress  Meds: acetaminophen    Suspension .. 650 milliGRAM(s) Enteral Tube every 6 hours PRN Temp greater or equal to 38C (100.4F)  fentaNYL   Infusion. 0.5 MICROgram(s)/kG/Hr IV Continuous <Continuous>  HYDROmorphone  Injectable 1 milliGRAM(s) IV Push every 2 hours PRN Ventilator dyssynchrony  ketamine Infusion 0.126 mG/kG/Hr IV Continuous <Continuous>  oxyCODONE    Solution 5 milliGRAM(s) Enteral Tube every 4 hours  propofol Infusion 35 MICROgram(s)/kG/Min IV Continuous <Continuous>    Adequacy of sedation and pain control has been assessed and adjusted.      RESPIRATORY  RR: 12 (11 - 14)  SpO2: 98% (90% - 100%)  Exam: without dissynchrony  Mechanical Ventilation: Mode: AC/ CMV (Assist Control/ Continuous Mandatory Ventilation), RR (machine): 14, TV (machine): 350, FiO2: 60, PEEP: 14  Predicted Body Weight:  Tidal Volume/PBW ratio:  PaO2 to FiO2 ratio:  Extubation readiness assessed.  ABG - ( 02 Apr 2020 00:16 )  pH: 7.28  /  pCO2: 40    /  pO2: 130   / HCO3: 18    / Base Excess: -7.4  /  SaO2: 98      Lactate: x                CARDIOVASCULAR  HR: 85 (72 - 87)  ABP: 132/42 (106/43 - 140/49)  ABP(mean): 68 (64 - 77)  Cardiac Rhythm: sinus  Most recent QTc: 12 Lead ECG:   Ventricular Rate 76 BPM    Atrial Rate 76 BPM    P-R Interval 180 ms    QRS Duration 92 ms    Q-T Interval 390 ms    QTC Calculation(Bezet) 438 ms    P Axis 55 degrees    R Axis -3 degrees    T Axis 49 degrees    Diagnosis Line NORMAL SINUS RHYTHM  LOW VOLTAGE QRS      Confirmed by SANTOS OLSON MD ([id number]) on 3/29/2020 3:41:44 PM (03-29 @ 05:32)    Meds: norepinephrine Infusion (14.9 mL/Hr)        GI/NUTRITION  Diet: Diet, NPO with Tube Feed:   Tube Feeding Modality: Orogastric  Glucerna 1.2 Aj (GLUCERNAR)  Total Volume for 24 Hours (mL): 1080  Continuous  Starting Tube Feed Rate mL per Hour: 45     Every hour  Until Goal Tube Feed Rate (mL per Hour): 45  Tube Feed Duration (in Hours): 24  Tube Feed Start Time: 18:30  No Carb Prosource (1pkg = 15gms Protein)     Qty per Day:  2 (03-30-20 @ 18:33)    Most recent bowel movement:  Meds/stress ulcer prophylaxis: ascorbic acid 500 milliGRAM(s) Oral daily  famotidine Injectable 20 milliGRAM(s) IV Push daily        GENITOURINARY  I&O's Detail    03-31 @ 07:01  -  04-01 @ 07:00  --------------------------------------------------------  IN:    Enteral Tube Flush: 150 mL    Glucerna 1.5: 680 mL    IV PiggyBack: 50 mL    norepinephrine Infusion: 242.4 mL    propofol Infusion: 453.6 mL  Total IN: 1576 mL    OUT:    Indwelling Catheter - Urethral: 865 mL  Total OUT: 865 mL    Total NET: 711 mL      04-01 @ 07:01  -  04-02 @ 01:03  --------------------------------------------------------  IN:    fentaNYL Infusion.: 49.5 mL    Glucerna 1.5: 530 mL    norepinephrine Infusion: 143.7 mL    propofol Infusion: 352.2 mL  Total IN: 1075.4 mL    OUT:    Indwelling Catheter - Urethral: 490 mL  Total OUT: 490 mL    Total NET: 585.4 mL          04-02    137  |  104  |  44<H>  ----------------------------<  165<H>  4.8   |  15<L>  |  2.32<H>    Ca    8.5      02 Apr 2020 00:20  Phos  5.7     04-02  Mg     2.5     04-02    TPro  6.3  /  Alb  2.4<L>  /  TBili  0.4  /  DBili  x   /  AST  36  /  ALT  27  /  AlkPhos  107  04-02    [x] [place]amo catheter, indication: urine output monitoring in critically ill patient.  Meds: ascorbic acid 500 milliGRAM(s) Oral daily    Creatine Kinase, Serum: 130 (04-01-20 @ 01:47)        HEMATOLOGIC  Meds/VTE prophylaxis: enoxaparin Injectable 30 milliGRAM(s) SubCutaneous daily                          10.4   12.26 )-----------( 207      ( 02 Apr 2020 00:20 )             31.7     PT/INR - ( 01 Apr 2020 01:47 )   PT: 14.3 sec;   INR: 1.25 ratio         PTT - ( 01 Apr 2020 01:47 )  PTT:34.3 sec  [id number] @ 07:22        INFECTIOUS DISEASES  T(C): 37.1, Max: 38.6 (04-01-20 @ 04:00)  WBC Count: 12.26 (04-02-20 @ 00:20)  WBC Count: 8.84 (04-01-20 @ 01:47)    Recent Cultures:  Specimen Source: .Urine Catheterized, 03-31 @ 00:36; Results   No growth; Gram Stain: --; Organism: --  Specimen Source: .Blood Blood-Peripheral, 03-29 @ 18:35; Results   No growth to date.; Gram Stain: --; Organism: --    Meds: cefepime   IVPB 2000 milliGRAM(s) IV Intermittent every 12 hours  influenza   Vaccine 0.5 milliLiter(s) IntraMuscular once    Procalcitonin, Serum: 5.55 ng/mL (04-01-20 @ 01:47)        ENDOCRINE  Fingersticks: 159, 177, 177, 194, 187  Meds: atorvastatin 20 milliGRAM(s) Oral at bedtime  insulin lispro (HumaLOG) corrective regimen sliding scale   SubCutaneous every 6 hours  levothyroxine Injectable 50 MICROGram(s) IV Push at bedtime        ACCESS DEVICES:  [ ] Peripheral IV  [x] Central Venous Line	[ ] R	[ ] L	[ ] IJ	[ ] Fem	[ ] SC	Placed:   [x] Arterial Line		[ ] R	[ ] L	[ ] Fem	[ ] Rad	[ ] Ax	Placed:   [ ] Shiley HD Access             [ ] R [ ] L [ ] IJ  [ ] Fem     Placed:  [x] Urinary Catheter, Date Placed:   Necessity of urinary, arterial, and venous catheters discussed.      CODE STATUS:     IMAGING: HISTORY  75y Female with COVID-19(+) pneumonia requiring intubation.    24 HOUR EVENTS:    SUBJECTIVE/ROS: Due to intubation with sedation, subjective information was not able to be obtained from the patient. History was obtained, to the extent possible, from review of the chart and collateral sources of information.      NEURO  Exam: sedated, no acute distress  Meds: acetaminophen    Suspension .. 650 milliGRAM(s) Enteral Tube every 6 hours PRN Temp greater or equal to 38C (100.4F)  fentaNYL   Infusion. 0.5 MICROgram(s)/kG/Hr IV Continuous <Continuous>  HYDROmorphone  Injectable 1 milliGRAM(s) IV Push every 2 hours PRN Ventilator dyssynchrony  ketamine Infusion 0.126 mG/kG/Hr IV Continuous <Continuous>  oxyCODONE    Solution 5 milliGRAM(s) Enteral Tube every 4 hours  propofol Infusion 35 MICROgram(s)/kG/Min IV Continuous <Continuous>    Adequacy of sedation and pain control has been assessed and adjusted.      RESPIRATORY  RR: 12 (11 - 14)  SpO2: 98% (90% - 100%)  Exam: without dissynchrony  Mechanical Ventilation: Mode: AC/ CMV (Assist Control/ Continuous Mandatory Ventilation), RR (machine): 14, TV (machine): 350, FiO2: 60, PEEP: 14  Predicted Body Weight:  Tidal Volume/PBW ratio:   PaO2 to FiO2 ratio: 217   Extubation readiness assessed.  ABG - ( 02 Apr 2020 00:16 )  pH: 7.28  /  pCO2: 40    /  pO2: 130   / HCO3: 18    / Base Excess: -7.4  /  SaO2: 98      Lactate: x                CARDIOVASCULAR  HR: 85 (72 - 87)  ABP: 132/42 (106/43 - 140/49)  ABP(mean): 68 (64 - 77)  Cardiac Rhythm: sinus  Most recent QTc: 12 Lead ECG:   Ventricular Rate 76 BPM    Atrial Rate 76 BPM    P-R Interval 180 ms    QRS Duration 92 ms    Q-T Interval 390 ms    QTC Calculation(Bezet) 438 ms    P Axis 55 degrees    R Axis -3 degrees    T Axis 49 degrees    Diagnosis Line NORMAL SINUS RHYTHM  LOW VOLTAGE QRS      Confirmed by SANTOS OLSON MD ([id number]) on 3/29/2020 3:41:44 PM (03-29 @ 05:32)    Meds: norepinephrine Infusion (14.9 mL/Hr)        GI/NUTRITION  Diet: Diet, NPO with Tube Feed:   Tube Feeding Modality: Orogastric  Glucerna 1.2 Aj (GLUCERNARTH)  Total Volume for 24 Hours (mL): 1080  Continuous  Starting Tube Feed Rate mL per Hour: 45     Every hour  Until Goal Tube Feed Rate (mL per Hour): 45  Tube Feed Duration (in Hours): 24  Tube Feed Start Time: 18:30  No Carb Prosource (1pkg = 15gms Protein)     Qty per Day:  2 (03-30-20 @ 18:33)    Most recent bowel movement:  Meds/stress ulcer prophylaxis: ascorbic acid 500 milliGRAM(s) Oral daily  famotidine Injectable 20 milliGRAM(s) IV Push daily        GENITOURINARY  I&O's Detail    03-31 @ 07:01  -  04-01 @ 07:00  --------------------------------------------------------  IN:    Enteral Tube Flush: 150 mL    Glucerna 1.5: 680 mL    IV PiggyBack: 50 mL    norepinephrine Infusion: 242.4 mL    propofol Infusion: 453.6 mL  Total IN: 1576 mL    OUT:    Indwelling Catheter - Urethral: 865 mL  Total OUT: 865 mL    Total NET: 711 mL      04-01 @ 07:01  -  04-02 @ 01:03  --------------------------------------------------------  IN:    fentaNYL Infusion.: 49.5 mL    Glucerna 1.5: 530 mL    norepinephrine Infusion: 143.7 mL    propofol Infusion: 352.2 mL  Total IN: 1075.4 mL    OUT:    Indwelling Catheter - Urethral: 490 mL  Total OUT: 490 mL    Total NET: 585.4 mL          04-02    137  |  104  |  44<H>  ----------------------------<  165<H>  4.8   |  15<L>  |  2.32<H>    Ca    8.5      02 Apr 2020 00:20  Phos  5.7     04-02  Mg     2.5     04-02    TPro  6.3  /  Alb  2.4<L>  /  TBili  0.4  /  DBili  x   /  AST  36  /  ALT  27  /  AlkPhos  107  04-02    [x] [facility] catheter, indication: urine output monitoring in critically ill patient.  Meds: ascorbic acid 500 milliGRAM(s) Oral daily    Creatine Kinase, Serum: 130 (04-01-20 @ 01:47)        HEMATOLOGIC  Meds/VTE prophylaxis: enoxaparin Injectable 30 milliGRAM(s) SubCutaneous daily                          10.4   12.26 )-----------( 207      ( 02 Apr 2020 00:20 )             31.7     PT/INR - ( 01 Apr 2020 01:47 )   PT: 14.3 sec;   INR: 1.25 ratio         PTT - ( 01 Apr 2020 01:47 )  PTT:34.3 sec  [id number] @ 07:22        INFECTIOUS DISEASES  T(C): 37.1, Max: 38.6 (04-01-20 @ 04:00)  WBC Count: 12.26 (04-02-20 @ 00:20)  WBC Count: 8.84 (04-01-20 @ 01:47)    Recent Cultures:  Specimen Source: .Urine Catheterized, 03-31 @ 00:36; Results   No growth; Gram Stain: --; Organism: --  Specimen Source: .Blood Blood-Peripheral, 03-29 @ 18:35; Results   No growth to date.; Gram Stain: --; Organism: --    Meds: cefepime   IVPB 2000 milliGRAM(s) IV Intermittent every 12 hours  influenza   Vaccine 0.5 milliLiter(s) IntraMuscular once    Procalcitonin, Serum: 5.55 ng/mL (04-01-20 @ 01:47)        ENDOCRINE  Fingersticks: 159, 177, 177, 194, 187  Meds: atorvastatin 20 milliGRAM(s) Oral at bedtime  insulin lispro (HumaLOG) corrective regimen sliding scale   SubCutaneous every 6 hours  levothyroxine Injectable 50 MICROGram(s) IV Push at bedtime        ACCESS DEVICES:  [ ] Peripheral IV  [x] Central Venous Line	[ ] R	[ ] L	[ ] IJ	[ ] Fem	[ ] SC	Placed:   [x] Arterial Line		[ ] R	[ ] L	[ ] Fem	[ ] Rad	[ ] Ax	Placed:   [ ] Shiley HD Access             [ ] R [ ] L [ ] IJ  [ ] Fem     Placed:  [x] Urinary Catheter, Date Placed:   Necessity of urinary, arterial, and venous catheters discussed.      CODE STATUS:     IMAGING:

## 2020-04-02 NOTE — PROGRESS NOTE ADULT - SUBJECTIVE AND OBJECTIVE BOX
ERNIE GREEN   MRN#: [id number]     The patient is a 75y Female who was seen, evaluated, & examined with the ICU staff with a multidisciplinary care plan formulated & implemented. All available clinical, laboratory, radiographic, pharmacologic, and electrocardiographic data were reviewed & analyzed.      The patient was in the ICU in critical condition secondary to:     ARDS  actue hypoxic/hypercarbic respiratory failure  vasodilatory shock    For support and evaluation & prevention of further decompensation secondary to persistent cardiopulmonary dysfunction, respiratory status required:     supplemental oxygen with full ventilatory support / mechanical ventilation  continuous pulse oximetry monitoring  following ABGs with A-line monitoring  IV Cisatracurium infusion  IV Ketamine infusion  IV Propofol infusion  IV Fentanyl infusion     Invasive hemodynamic monitoring with     an A-line was required for continuous MAP/BP monitoring     to ensure adequate cardiovascular support and to evaluate for & help prevent decompensation while receiving     IV Levophed infusion    secondary to     vasodilatory shock    In addition:  COVID+ admitted with respiratory failure/ARDS, intubation day 5  Mild/Moderate/Severe ARDS with P/F and plateau pressure 14  Driving pressure 17, tidal volume 6 cc/kg  Sedated with Propofol and Versed - will paralyze due to dyssynchrony   Minimal pressor requirement, like due to vasoplegia from sedation +/- sepsis  Transition trickle feeds; no recent bowel movement, will add Reglan and Lactulose to bowel regimen  YELITZA on CVVH with 100 cc/hr off - target 500 cc to 1L overall negative - Renal following  S/p Plaquenil  On Cefepime for +UA, culture negative -  will stop antibiotics  H/H downtrending but stable on heparin gtt - no signs of active bleeding, will trend  Sugars slightly elevated, will adjust to high dose sliding scale  TLC and danitza day 5    The patient required critical care management and I personally provided 30 minutes of non-continuous care to the patient, excluding separate procedures, in addition to discussing the patient and plan at length with the ICU staff and helping coordinate care. ERNIE GREEN   MRN#: [id number]     The patient is a 75y Female who was seen, evaluated, & examined with the ICU staff with a multidisciplinary care plan formulated & implemented. All available clinical, laboratory, radiographic, pharmacologic, and electrocardiographic data were reviewed & analyzed.      The patient was in the ICU in critical condition secondary to:     ARDS  actue hypoxic/hypercarbic respiratory failure  vasodilatory shock    For support and evaluation & prevention of further decompensation secondary to persistent cardiopulmonary dysfunction, respiratory status required:     supplemental oxygen with full ventilatory support / mechanical ventilation  continuous pulse oximetry monitoring  following ABGs with A-line monitoring  IV Ketamine infusion  IV Propofol infusion  IV Fentanyl infusion     Invasive hemodynamic monitoring with     an A-line was required for continuous MAP/BP monitoring     to ensure adequate cardiovascular support and to evaluate for & help prevent decompensation while receiving     IV Levophed infusion    secondary to     vasodilatory shock    In addition:  COVID+ admitted with respiratory failure/ARDS, intubation day 5  Mild/Moderate/Severe ARDS with P/F and plateau pressure 14  Driving pressure 17, tidal volume 6 cc/kg  Sedated with multiple agents, double-triggering on the vent but desatted with a push of Nimbex  Avoid paralytics currently, transition Fentanyl to Dilaudid infusion, transition Ketamine to Versed as ? if Ketamine can induce dyssynchrony / diassociation in some patients  Minimal pressor requirement, like due to vasoplegia from sedation  Transition to trickle feeds; no recent bowel movement, will add Lactulose to bowel regimen  Overall net positive with worsening renal failure - would diurese and target 1-2L overall net negative  S/p Plaquenil  On Cefepime for +UA in addition to empiric Vancomycin by level, cultures negative - will stop antibiotics  H/H low but stable - no signs of active bleeding, will trend  Sugars slightly elevated, will adjust to high dose sliding scale  TLC and danitza day 5    The patient required critical care management and I personally provided 30 minutes of non-continuous care to the patient, excluding separate procedures, in addition to discussing the patient and plan at length with the ICU staff and helping coordinate care.

## 2020-04-03 NOTE — CONSULT NOTE ADULT - PROBLEM SELECTOR RECOMMENDATION 9
- from COVID-19, on ventilator, overall poor prognosis  - discussed with daughter, who is aware of overall poor prognosis. She states having a detailed discussion where GOC were discussed, and her and family opted for DNR status, but continuing with mechanical ventilation for now. Given conversation for GOC was held prior to my conversation and goals were identified, I did not conduct a full GOC conversation. Defer to primary team to complete MOLST and DNR order given above  - d/w team

## 2020-04-03 NOTE — CONSULT NOTE ADULT - ASSESSMENT
75F with PMH of HTN, HLD, DM, hypothyroid here with SOB, found to have COVID-19, intubated, with overall poor status, with bradycardia, on pressors as well. Patient's daughter (a physician) is open to DNR, which was confirmed in conversation with surgical team. Palliative care called for GOC.

## 2020-04-03 NOTE — CONSULT NOTE ADULT - SUBJECTIVE AND OBJECTIVE BOX
HPI: 75F with PMH of HTN, HLD, DM, hypothyroid here with SOB, found to have COVID-19, intubated, with overall poor status, with bradycardia, on pressors as well. Patient's daughter (a physician) is open to DNR, which was confirmed in conversation with surgical team. Palliative care called for GOC.     PERTINENT PM/SXH:   Hypothyroidism  Dyslipidemia  Type 2 diabetes mellitus  Essential hypertension    H/O foot surgery  History of cataract surgery    FAMILY HISTORY:    ITEMS NOT CHECKED ARE NOT PRESENT    SOCIAL HISTORY:   Significant other/partner[X ]  Children[ X]  Caodaism/Spirituality:  Substance hx:  [ ]   Tobacco hx:  [ ]   Alcohol hx: [ ]   Home Opioid hx:  [ ] I-Stop Reference No: [id number]  Living Situation: [ ]Home  [ ]Long term care  [ ]Rehab [ ]Other    ADVANCE DIRECTIVES:    DNR  MOLST  [ ]  Living Will  [ ]   DECISION MAKER(s):  [ ] Health Care Proxy(s)  [ ] Surrogate(s)  [ ] Guardian           Name(s): Phone Number(s):    BASELINE (I)ADL(s) (prior to admission):  [place]: [ ]Total  [ ] Moderate [ ]Dependent    Allergies    lidocaine (Other)    Intolerances    MEDICATIONS  (STANDING):  atorvastatin 20 milliGRAM(s) Oral at bedtime  chlorhexidine 0.12% Liquid 15 milliLiter(s) Oral Mucosa every 12 hours  CRRT Treatment    <Continuous>  DOPamine Infusion 5 MICROgram(s)/kG/Min (14.9 mL/Hr) IV Continuous <Continuous>  famotidine Injectable 20 milliGRAM(s) IV Push daily  heparin  Infusion Syringe 500 Unit(s)/Hr (1 mL/Hr) CRRT <Continuous>  HYDROmorphone Infusion 2 mG/Hr (2 mL/Hr) IV Continuous <Continuous>  influenza   Vaccine 0.5 milliLiter(s) IntraMuscular once  insulin lispro (HumaLOG) corrective regimen sliding scale   SubCutaneous every 6 hours  ketamine Infusion 0.126 mG/kG/Hr (1 mL/Hr) IV Continuous <Continuous>  levothyroxine Injectable 50 MICROGram(s) IV Push at bedtime  norepinephrine Infusion 0.05 MICROgram(s)/kG/Min (3.72 mL/Hr) IV Continuous <Continuous>  petrolatum Ophthalmic Ointment 1 Application(s) Both EYES two times a day  PrismaSATE Dialysate BK 0 / 3.5 5000 milliLiter(s) (1000 mL/Hr) CRRT <Continuous>  PrismaSOL Filtration BGK 4 / 2.5 5000 milliLiter(s) (2000 mL/Hr) CRRT <Continuous>  PrismaSOL Filtration BGK 4 / 2.5 5000 milliLiter(s) (200 mL/Hr) CRRT <Continuous>  propofol Infusion 35.099 MICROgram(s)/kG/Min (16.7 mL/Hr) IV Continuous <Continuous>  vasopressin Infusion 0.04 Unit(s)/Min (2.4 mL/Hr) IV Continuous <Continuous>    MEDICATIONS  (PRN):  acetaminophen    Suspension .. 650 milliGRAM(s) Enteral Tube every 6 hours PRN Temp greater or equal to 38C (100.4F)    PRESENT SYMPTOMS: [X ]Unable to obtain due to poor mentation   Source if other than patient:  [ ]Family   [ ]Team     Pain: [ ]yes [ ]no  QOL impact -   Location -                    Aggravating factors -  Quality -  Radiation -  Timing-  Severity (0-10 scale):  Minimal acceptable level (0-10 scale):     CPOT:    https://www.sccm.org/getattachment/ypj52x13-9o2j-0a6z-3u6v-1143o2656y9z/Critical-Care-Pain-Observation-Tool-(CPOT)      PAIN AD Score:     http://geriatrictoolkit.[place].[facility]/cog/painad.pdf (press ctrl +  left click to view)    Dyspnea:                           [ ]Mild [ ]Moderate [ ]Severe  Anxiety:                             [ ]Mild [ ]Moderate [ ]Severe  Fatigue:                             [ ]Mild [ ]Moderate [ ]Severe  Nausea:                             [ ]Mild [ ]Moderate [ ]Severe  Loss of appetite:              [ ]Mild [ ]Moderate [ ]Severe  Constipation:                    [ ]Mild [ ]Moderate [ ]Severe    Other Symptoms:  [ ]All other review of systems negative     Palliative Performance Status Version 2:         %    http://npcrc.org/files/news/palliative_performance_scale_ppsv2.pdf  PHYSICAL EXAM:  Vital Signs Last 24 Hrs  T(C): 36 (03 Apr 2020 16:00), Max: 37 (03 Apr 2020 09:00)  T(F): 96.8 (03 Apr 2020 16:00), Max: 98.6 (03 Apr 2020 09:00)  HR: 92 (03 Apr 2020 17:00) (40 - 98)  BP: --  BP(mean): --  RR: 16 (03 Apr 2020 13:00) (0 - 30)  SpO2: 89% (03 Apr 2020 17:00) (80% - 99%) I&O's Summary    02 Apr 2020 07:01  -  03 Apr 2020 07:00  --------------------------------------------------------  IN: 2752.2 mL / OUT: 310 mL / NET: 2442.2 mL    03 Apr 2020 07:01  -  03 Apr 2020 17:49  --------------------------------------------------------  IN: 682.3 mL / OUT: 841 mL / NET: -158.7 mL    Limited exam for patient safety and to limit infection risk during COVID-19 outbreak. Please refr to primary team's examination for today.  GENERAL:  [ ]Alert  [ ]Oriented x   [ ]Lethargic  [ ]Cachexia  [ ]Unarousable  [ ]Verbal  [ ]Non-Verbal  Behavioral:   [ ] Anxiety  [ ] Delirium [ ] Agitation [ ] Other  HEENT:  [ ]Normal   [ ]Dry mouth   [ ]ET Tube/Trach  [ ]Oral lesions  PULMONARY:   [ ]Clear [ ]Tachypnea  [ ]Audible excessive secretions   [ ]Rhonchi        [ ]Right [ ]Left [ ]Bilateral  [ ]Crackles        [ ]Right [ ]Left [ ]Bilateral  [ ]Wheezing     [ ]Right [ ]Left [ ]Bilateral  [ ]Diminished breath sounds [ ]right [ ]left [ ]bilateral  CARDIOVASCULAR:    [ ]Regular [ ]Irregular [ ]Tachy  [ ]Felix [ ]Murmur [ ]Other  GASTROINTESTINAL:  [ ]Soft  [ ]Distended   [ ]+BS  [ ]Non tender [ ]Tender  [ ]PEG [ ]OGT/ NGT  Last BM:   03-30-20 @ 07:01  -  03-31-20 @ 07:00  --------------------------------------------------------  OUT: 150 mL    04-02-20 @ 07:01  -  04-03-20 @ 07:00  --------------------------------------------------------  OUT: 55 mL      GENITOURINARY:  [ ]Normal [ ] Incontinent   [ ]Oliguria/Anuria   [ ][place]amo  MUSCULOSKELETAL:   [ ]Normal   [ ]Weakness  [ ]Bed/Wheelchair bound [ ]Edema  NEUROLOGIC:   [ ]No focal deficits  [ ]Cognitive impairment  [ ]Dysphagia [ ]Dysarthria [ ]Paresis [ ]Other   SKIN:   [ ]Normal    [ ]Rash  [ ]Pressure ulcer(s)       Present on admission [ ]y [ ]n    CRITICAL CARE:  [ ] Shock Present  [ ]Septic [ ]Cardiogenic [ ]Neurologic [ ]Hypovolemic  [ ]  Vasopressors [ ]  Inotropes   [ ]Respiratory failure present [ ]Mechanical ventilation [ ]Non-invasive ventilatory support [ ]High flow  [ ]Acute  [ ]Chronic [ ]Hypoxic  [ ]Hypercarbic [ ]Other  [ ]Other organ failure     LABS: reviewed                        10.8   12.48 )-----------( 154      ( 03 Apr 2020 12:36 )             33.5   04-03    135  |  97  |  37<H>  ----------------------------<  130<H>  3.7   |  18<L>  |  2.12<H>    Ca    9.8      03 Apr 2020 12:36  Phos  8.5     04-03  Mg     2.4     04-03    TPro  6.3  /  Alb  2.3<L>  /  TBili  0.8  /  DBili  x   /  AST  95<H>  /  ALT  46<H>  /  AlkPhos  206<H>  04-03  PT/INR - ( 03 Apr 2020 00:38 )   PT: 13.7 sec;   INR: 1.19 ratio         PTT - ( 03 Apr 2020 00:38 )  PTT:35.3 sec      RADIOLOGY & ADDITIONAL STUDIES:    PROTEIN CALORIE MALNUTRITION PRESENT: [ ]mild [ ]moderate [ ]severe [ ]underweight [ ]morbid obesity  https://www.andeal.org/vault/2440/web/files/ONC/Table_Clinical%20Characteristics%20to%20Document%20Malnutrition-White%20JV%20et%20al%202012.pdf    Height (cm): 157.5 (03-28-20 @ 14:45)  Weight (kg): 79.3 (03-28-20 @ 14:45)  BMI (kg/m2): 32 (03-28-20 @ 14:45)    [ ]PPSV2 < or = to 30% [ ]significant weight loss  [ ]poor nutritional intake  [ ]anasarca     Albumin, Serum: 2.3 g/dL (04-03-20 @ 12:36)   [ ]Artificial Nutrition      REFERRALS:   [ ]Chaplaincy  [ ]Hospice  [ ]Child Life  [ ]Social Work  [ ]Case management [ ]Holistic Therapy     Goals of Care Document:     ______________  Aramis Chang MD   of Geriatric and Palliative Medicine  [facility]     Please page the following number for clinical matters between the hours of 9AM and 5PM   from Monday through Friday : [phone]    After 5PM and on weekends, please page: [phone]. The Geriatric and Palliative Medicine consult service has 24/7 coverage for medical recommendations, including for symptom management needs.

## 2020-04-03 NOTE — CONSULT NOTE ADULT - PROBLEM SELECTOR RECOMMENDATION 2
- is currently on hydromorphine gtt  - can use dilaudid 2mg Q1 PRN for dyspnea or pain  - can use robinul 0.4mg IV Q4 PRN secretions  - will continue to follow  - palliative is available as needed: please page [phone]

## 2020-04-03 NOTE — PROGRESS NOTE ADULT - ATTENDING COMMENTS
Propofol and ketamine per COVID + icu protocol  will decrease propofol in view of bradycardia as tolerated  on PEEP of 16, 60 percent FIO2.  Able to decrease to 50%  Metabolic and respiratory acidoses increased respiratory rate to 30 pH to 7.12  Levophed at .11 microgram per kilogram per minute.   Added Dopamine for bradycardia into 20's.  EKG shows sinus bradycardia.    This prompted notifying family.  I extensive discussion with patients daughter who is very supportive of DNR and will convey this to remainder of family.  Given anticipated poor prognosis will support DNR status  I will continue discussions as needed or tomorrow if patient does stabilize  On CVVHD with intent to remove 50 cc per hour  60 minutes of critical care management applied

## 2020-04-03 NOTE — PROGRESS NOTE ADULT - ATTENDING COMMENTS
After 5pm, and on weekends, call the on call fellow/house nephrology team initiated on CVVHDF overnight due to worsened hyperK and acidosis  tolerating CVVHDF well  continue CVVHDF    Caprice Spaulding MD  Cell : [phone]  Pager : [phone]  Office : [phone]    After 5pm, and on weekends, call the on call fellow/house nephrology team

## 2020-04-03 NOTE — PROGRESS NOTE ADULT - SUBJECTIVE AND OBJECTIVE BOX
Patient acutely, profoundly acidotic, hypothermic.  Spoke w daughter, Divine (MD) who will speak w rest of family re goals of care, DNR.  They will call shortly w decision.

## 2020-04-03 NOTE — PROGRESS NOTE ADULT - ASSESSMENT
5F w/ DM2, HTN, HLD, hypothyroidism admitted 3/27 w/ shortness of breath, was hypoxic 86%. 5  days prior had general weakness, fatigue , general headache, body ache, poor appetite. 2 days PTA, developed fever 102.9F at home. On admission pt denied chest pain, abdominal pain, nausea, vomiting, diarrhea, constipation, urinary symptoms.  As per  , there is no history of travel within or outside of the [place], no known sick contact .  Patient went to a party about 2 weeks ago in [facility] with the family only and goes to [employer] as need and also drops her children to their school.    Neuro:   - Propofol, Dilaudid PRN, Tylenol, will attempt sedation vacation   - No hx of paralysis     Respiratory:   - Vent: AC//30/16/40%  - P/F: 225  - No hx of proning    CV:   - Started Levo (0.04)  - Net positive fluid status       GI:   - 3/29: Glucerna @45 is goal, will titrate up and check residuals.  - Pepcid BID  - D/c'ed rectal tube --> dc no diarrhea on 3/29    : Cr 0.99  - Will give 40 mg Lasix today (20mg 3/29 --> good UOP)      Heme:   - LVX for dvt ppx    ID: COVID +  - On Hydroxychloroquine and Vit C(3/27-3/31)  - Procal 5.9 (0.54) --> was started on empiric vanc/cefepime (3/29)  - D/dex Vanc  - c/w  cefepime  but will redose for renal failure to 2 g q 12hr     Bcx sent on 3/29  - UA + 3/29  - Order Ucx    Endo: DM2  - SSI

## 2020-04-03 NOTE — PROGRESS NOTE ADULT - SUBJECTIVE AND OBJECTIVE BOX
[facility] DIVISION OF KIDNEY DISEASES AND HYPERTENSION -- PROGRESS NOTE    Chief complaint:   YELITZA    24 hour events/subjective:  making urine        PAST HISTORY  --------------------------------------------------------------------------------  No significant changes to PMH, PSH, FHx, SHx, unless otherwise noted    ALLERGIES & MEDICATIONS  --------------------------------------------------------------------------------  Allergies    lidocaine (Other)    Intolerances      Standing Inpatient Medications  atorvastatin 20 milliGRAM(s) Oral at bedtime  chlorhexidine 0.12% Liquid 15 milliLiter(s) Oral Mucosa every 12 hours  CRRT Treatment    <Continuous>  CRRT Treatment    <Continuous>  famotidine Injectable 20 milliGRAM(s) IV Push daily  heparin  Infusion Syringe 500 Unit(s)/Hr CRRT <Continuous>  heparin  Infusion Syringe 500 Unit(s)/Hr CRRT <Continuous>  HYDROmorphone Infusion 2 mG/Hr IV Continuous <Continuous>  influenza   Vaccine 0.5 milliLiter(s) IntraMuscular once  insulin lispro (HumaLOG) corrective regimen sliding scale   SubCutaneous every 6 hours  ketamine Infusion 0.126 mG/kG/Hr IV Continuous <Continuous>  levothyroxine Injectable 50 MICROGram(s) IV Push at bedtime  norepinephrine Infusion 0.05 MICROgram(s)/kG/Min IV Continuous <Continuous>  petrolatum Ophthalmic Ointment 1 Application(s) Both EYES two times a day  PrismaSATE Dialysate BK 0 / 3.5 5000 milliLiter(s) CRRT <Continuous>  PrismaSATE Dialysate BK 0 / 3.5 5000 milliLiter(s) CRRT <Continuous>  PrismaSOL Filtration BGK 4 / 2.5 5000 milliLiter(s) CRRT <Continuous>  PrismaSOL Filtration BGK 4 / 2.5 5000 milliLiter(s) CRRT <Continuous>  PrismaSOL Filtration BGK 4 / 2.5 5000 milliLiter(s) CRRT <Continuous>  PrismaSOL Filtration BGK 4 / 2.5 5000 milliLiter(s) CRRT <Continuous>  propofol Infusion 35.099 MICROgram(s)/kG/Min IV Continuous <Continuous>  vasopressin Infusion 0.04 Unit(s)/Min IV Continuous <Continuous>    PRN Inpatient Medications  acetaminophen    Suspension .. 650 milliGRAM(s) Enteral Tube every 6 hours PRN      REVIEW OF SYSTEMS  --------------------------------------------------------------------------------  Constitutional: [ ] Fever [ ] Chills [ ] Fatigue [ ] Weight change   HEENT: [ ] Blurred vision [ ] Eye Pain [ ] Headache [ ] Runny nose [ ] Sore Throat   Respiratory: [ ] Cough [ ] Wheezing [ ] Shortness of breath  Cardiovascular: [ ] Chest Pain [ ] Palpitations [ ] DICKSON [ ] PND [ ] Orthopnea  Gastrointestinal: [ ] Abdominal Pain [ ] Diarrhea [ ] Constipation [ ] Hemorrhoids [ ] Nausea [ ] Vomiting  Genitourinary: [ ] Nocturia [ ] Dysuria [ ] Incontinence  Extremities: [ ] Swelling [ ] Joint Pain  Neurologic: [ ] Focal deficit [ ] Paresthesias [ ] Syncope  Lymphatic: [ ] Swelling [ ] Lymphadenopathy   Skin: [ ] Rash [ ] Ecchymoses [ ] Wounds [ ] Lesions  Psychiatry: [ ] Depression [ ] Suicidal/Homicidal Ideation [ ] Anxiety [ ] Sleep Disturbances  [ ] 10 point review of systems is otherwise negative except as mentioned above              [x ]Unable to obtain due to patient intubated  All other systems were reviewed and are negative, except as noted.    VITALS/PHYSICAL EXAM  --------------------------------------------------------------------------------  T(C): 37 (04-03-20 @ 09:00), Max: 37 (04-03-20 @ 09:00)  HR: 48 (04-03-20 @ 10:00) (48 - 113)  BP: --  RR: 30 (04-03-20 @ 10:00) (0 - 30)  SpO2: 99% (04-03-20 @ 10:00) (89% - 99%)  Wt(kg): --        04-02-20 @ 07:01  -  04-03-20 @ 07:00  --------------------------------------------------------  IN: 2752.2 mL / OUT: 310 mL / NET: 2442.2 mL    LABS/STUDIES  --------------------------------------------------------------------------------              10.8   19.54 >-----------<  182      [04-03-20 @ 00:35]              29.7     136  |  101  |  56  ----------------------------<  119      [04-03-20 @ 04:49]  5.2   |  16  |  3.25        Ca     9.6     [04-03-20 @ 04:49]      Mg     2.9     [04-03-20 @ 00:39]      Phos  8.5     [04-03-20 @ 00:39]    TPro  6.7  /  Alb  2.2  /  TBili  0.5  /  DBili  x   /  AST  92  /  ALT  46  /  AlkPhos  213  [04-03-20 @ 04:49]    PT/INR: PT 13.7 , INR 1.19       [04-03-20 @ 00:38]  PTT: 35.3       [04-03-20 @ 00:38]    CK 94      [04-03-20 @ 00:39]        [04-03-20 @ 00:39]    Creatinine Trend:  SCr 3.25 [04-03 @ 04:49]  SCr 3.87 [04-03 @ 00:39]  SCr 3.62 [04-02 @ 19:16]  SCr 3.14 [04-02 @ 13:02]  SCr 2.32 [04-02 @ 00:20]    Urinalysis - [03-29-20 @ 15:04]      Color Yellow / Appearance Slightly Turbid / SG 1.029 / pH 6.0      Gluc Negative / Ketone Negative  / Bili Negative / Urobili Negative       Blood Moderate / Protein 30 mg/dL / Leuk Est Large / Nitrite Negative      RBC 43 / WBC 74 / Hyaline 30 / Gran 2 / Sq Epi  / Non Sq Epi 2 / Bacteria Many      Ferritin 4142      [04-03-20 @ 02:40]  HbA1c 6.3      [03-28-20 @ 12:44]  TSH 1.65      [03-27-20 @ 16:52]  Lipid: chol --, , HDL --, LDL --      [04-03-20 @ 00:39] [facility] DIVISION OF KIDNEY DISEASES AND HYPERTENSION -- PROGRESS NOTE    Chief complaint:   YELITZA    24 hour events/subjective:  started on CVVHDF overnight         PAST HISTORY  --------------------------------------------------------------------------------  No significant changes to PMH, PSH, FHx, SHx, unless otherwise noted    ALLERGIES & MEDICATIONS  --------------------------------------------------------------------------------  Allergies    lidocaine (Other)    Intolerances      Standing Inpatient Medications  atorvastatin 20 milliGRAM(s) Oral at bedtime  chlorhexidine 0.12% Liquid 15 milliLiter(s) Oral Mucosa every 12 hours  CRRT Treatment    <Continuous>  CRRT Treatment    <Continuous>  famotidine Injectable 20 milliGRAM(s) IV Push daily  heparin  Infusion Syringe 500 Unit(s)/Hr CRRT <Continuous>  heparin  Infusion Syringe 500 Unit(s)/Hr CRRT <Continuous>  HYDROmorphone Infusion 2 mG/Hr IV Continuous <Continuous>  influenza   Vaccine 0.5 milliLiter(s) IntraMuscular once  insulin lispro (HumaLOG) corrective regimen sliding scale   SubCutaneous every 6 hours  ketamine Infusion 0.126 mG/kG/Hr IV Continuous <Continuous>  levothyroxine Injectable 50 MICROGram(s) IV Push at bedtime  norepinephrine Infusion 0.05 MICROgram(s)/kG/Min IV Continuous <Continuous>  petrolatum Ophthalmic Ointment 1 Application(s) Both EYES two times a day  PrismaSATE Dialysate BK 0 / 3.5 5000 milliLiter(s) CRRT <Continuous>  PrismaSATE Dialysate BK 0 / 3.5 5000 milliLiter(s) CRRT <Continuous>  PrismaSOL Filtration BGK 4 / 2.5 5000 milliLiter(s) CRRT <Continuous>  PrismaSOL Filtration BGK 4 / 2.5 5000 milliLiter(s) CRRT <Continuous>  PrismaSOL Filtration BGK 4 / 2.5 5000 milliLiter(s) CRRT <Continuous>  PrismaSOL Filtration BGK 4 / 2.5 5000 milliLiter(s) CRRT <Continuous>  propofol Infusion 35.099 MICROgram(s)/kG/Min IV Continuous <Continuous>  vasopressin Infusion 0.04 Unit(s)/Min IV Continuous <Continuous>    PRN Inpatient Medications  acetaminophen    Suspension .. 650 milliGRAM(s) Enteral Tube every 6 hours PRN      REVIEW OF SYSTEMS  --------------------------------------------------------------------------------  Constitutional: [ ] Fever [ ] Chills [ ] Fatigue [ ] Weight change   HEENT: [ ] Blurred vision [ ] Eye Pain [ ] Headache [ ] Runny nose [ ] Sore Throat   Respiratory: [ ] Cough [ ] Wheezing [ ] Shortness of breath  Cardiovascular: [ ] Chest Pain [ ] Palpitations [ ] DICKSON [ ] PND [ ] Orthopnea  Gastrointestinal: [ ] Abdominal Pain [ ] Diarrhea [ ] Constipation [ ] Hemorrhoids [ ] Nausea [ ] Vomiting  Genitourinary: [ ] Nocturia [ ] Dysuria [ ] Incontinence  Extremities: [ ] Swelling [ ] Joint Pain  Neurologic: [ ] Focal deficit [ ] Paresthesias [ ] Syncope  Lymphatic: [ ] Swelling [ ] Lymphadenopathy   Skin: [ ] Rash [ ] Ecchymoses [ ] Wounds [ ] Lesions  Psychiatry: [ ] Depression [ ] Suicidal/Homicidal Ideation [ ] Anxiety [ ] Sleep Disturbances  [ ] 10 point review of systems is otherwise negative except as mentioned above              [x ]Unable to obtain due to patient intubated  All other systems were reviewed and are negative, except as noted.    VITALS/PHYSICAL EXAM  --------------------------------------------------------------------------------  T(C): 37 (04-03-20 @ 09:00), Max: 37 (04-03-20 @ 09:00)  HR: 48 (04-03-20 @ 10:00) (48 - 113)  BP: --  RR: 30 (04-03-20 @ 10:00) (0 - 30)  SpO2: 99% (04-03-20 @ 10:00) (89% - 99%)  Wt(kg): --        04-02-20 @ 07:01  -  04-03-20 @ 07:00  --------------------------------------------------------  IN: 2752.2 mL / OUT: 310 mL / NET: 2442.2 mL    LABS/STUDIES  --------------------------------------------------------------------------------              10.8   19.54 >-----------<  182      [04-03-20 @ 00:35]              29.7     136  |  101  |  56  ----------------------------<  119      [04-03-20 @ 04:49]  5.2   |  16  |  3.25        Ca     9.6     [04-03-20 @ 04:49]      Mg     2.9     [04-03-20 @ 00:39]      Phos  8.5     [04-03-20 @ 00:39]    TPro  6.7  /  Alb  2.2  /  TBili  0.5  /  DBili  x   /  AST  92  /  ALT  46  /  AlkPhos  213  [04-03-20 @ 04:49]    PT/INR: PT 13.7 , INR 1.19       [04-03-20 @ 00:38]  PTT: 35.3       [04-03-20 @ 00:38]    CK 94      [04-03-20 @ 00:39]        [04-03-20 @ 00:39]    Creatinine Trend:  SCr 3.25 [04-03 @ 04:49]  SCr 3.87 [04-03 @ 00:39]  SCr 3.62 [04-02 @ 19:16]  SCr 3.14 [04-02 @ 13:02]  SCr 2.32 [04-02 @ 00:20]    Urinalysis - [03-29-20 @ 15:04]      Color Yellow / Appearance Slightly Turbid / SG 1.029 / pH 6.0      Gluc Negative / Ketone Negative  / Bili Negative / Urobili Negative       Blood Moderate / Protein 30 mg/dL / Leuk Est Large / Nitrite Negative      RBC 43 / WBC 74 / Hyaline 30 / Gran 2 / Sq Epi  / Non Sq Epi 2 / Bacteria Many      Ferritin 4142      [04-03-20 @ 02:40]  HbA1c 6.3      [03-28-20 @ 12:44]  TSH 1.65      [03-27-20 @ 16:52]  Lipid: chol --, , HDL --, LDL --      [04-03-20 @ 00:39]

## 2020-04-03 NOTE — PROGRESS NOTE ADULT - SUBJECTIVE AND OBJECTIVE BOX
HISTORY  75y Female with COVID-19(+) pneumonia requiring intubation.    24 HOUR EVENTS:    SUBJECTIVE/ROS: Due to intubation with sedation, subjective information was not able to be obtained from the patient. History was obtained, to the extent possible, from review of the chart and collateral sources of information.      NEURO  Exam: sedated, no acute distress  Meds: acetaminophen    Suspension .. 650 milliGRAM(s) Enteral Tube every 6 hours PRN Temp greater or equal to 38C (100.4F)  HYDROmorphone Infusion 2 mG/Hr IV Continuous <Continuous>  ketamine Infusion 0.126 mG/kG/Hr IV Continuous <Continuous>  propofol Infusion 35.099 MICROgram(s)/kG/Min IV Continuous <Continuous>    Adequacy of sedation and pain control has been assessed and adjusted.      RESPIRATORY  RR: 30 (12 - 30)  SpO2: 96% (89% - 98%)  Mechanical Ventilation: Mode: AC/ CMV (Assist Control/ Continuous Mandatory Ventilation), RR (machine): 30, TV (machine): 400, FiO2: 60, PEEP: 16  Predicted Body Weight: 50.1  Tidal Volume/PBW ratio: 7.98  PaO2 to FiO2 ratio: 225  Extubation readiness assessed.  ABG - ( 02 Apr 2020 22:18 )  pH: 7.06  /  pCO2: 64    /  pO2: 135   / HCO3: 17    / Base Excess: -13.0 /  SaO2: 98      Lactate: x                CARDIOVASCULAR  HR: 68 (68 - 113)  ABP: 126/45 (78/45 - 166/84)  ABP(mean): 72 (57 - 118)  Cardiac Rhythm: sinus  Most recent QTc:   Meds: norepinephrine Infusion (3.72 mL/Hr)        GI/NUTRITION  Diet: Diet, NPO with Tube Feed:   Tube Feeding Modality: Orogastric  Glucerna 1.2 Aj (GLUCERNARTH)  Total Volume for 24 Hours (mL): 480  Continuous  Starting Tube Feed Rate mL per Hour: 20  Until Goal Tube Feed Rate (mL per Hour): 20  Tube Feed Duration (in Hours): 24  Tube Feed Start Time: 12:30 (04-02-20 @ 12:15)    Most recent bowel movement:  Meds/stress ulcer prophylaxis: famotidine Injectable 20 milliGRAM(s) IV Push daily        GENITOURINARY  I&O's Detail    04-01 @ 07:01  -  04-02 @ 07:00  --------------------------------------------------------  IN:    fentaNYL Infusion.: 168.3 mL    Glucerna 1.5: 800 mL    IV PiggyBack: 50 mL    ketamine Infusion: 111.3 mL    norepinephrine Infusion: 215.1 mL    propofol Infusion: 495 mL  Total IN: 1839.7 mL    OUT:    Indwelling Catheter - Urethral: 670 mL  Total OUT: 670 mL    Total NET: 1169.7 mL      04-02 @ 07:01 - 04-03 @ 05:40  --------------------------------------------------------  IN:    bumetanide Infusion: 170 mL    Enteral Tube Flush: 100 mL    fentaNYL Infusion.: 139.2 mL    Glucerna 1.5: 520 mL    HYDROmorphone  Infusion: 32 mL    IV PiggyBack: 100 mL    ketamine Infusion: 315.3 mL    norepinephrine Infusion: 696.9 mL    propofol Infusion: 95.2 mL    propofol Infusion: 357 mL  Total IN: 2525.6 mL    OUT:    Indwelling Catheter - Urethral: 240 mL    Rectal Tube: 50 mL  Total OUT: 290 mL    Total NET: 2235.6 mL          04-03    136  |  101  |  56<H>  ----------------------------<  119<H>  5.2   |  16<L>  |  3.25<H>    Ca    9.6      03 Apr 2020 04:49  Phos  8.5     04-03  Mg     2.9     04-03    TPro  6.7  /  Alb  2.2<L>  /  TBili  0.5  /  DBili  x   /  AST  92<H>  /  ALT  46<H>  /  AlkPhos  213<H>  04-03    [x] Alamo catheter, indication: urine output monitoring in critically ill patient.  Meds:   Creatine Kinase, Serum: 94 (04-03-20 @ 00:39)        HEMATOLOGIC  Meds: heparin  Infusion Syringe 500 Unit(s)/Hr CRRT <Continuous>    VTE Prophylaxis:                         10.8   19.54 )-----------( 182      ( 03 Apr 2020 00:35 )             29.7     PT/INR - ( 03 Apr 2020 00:38 )   PT: 13.7 sec;   INR: 1.19 ratio         PTT - ( 03 Apr 2020 00:38 )  PTT:35.3 sec  [id number] @ 02:40        INFECTIOUS DISEASES  T(C): 36.8, Max: 36.9 (04-02-20 @ 08:00)  WBC Count: 19.54 (04-03-20 @ 00:35)  WBC Count: 12.26 (04-02-20 @ 00:20)    Recent Cultures:  Specimen Source: .Urine Catheterized, 03-31 @ 00:36; Results   No growth; Gram Stain: --; Organism: --  Specimen Source: .Blood Blood-Peripheral, 03-29 @ 18:35; Results   No Growth Final; Gram Stain: --; Organism: --    Meds: influenza   Vaccine 0.5 milliLiter(s) IntraMuscular once    Procalcitonin, Serum: 19.62 ng/mL (04-03-20 @ 00:39)        ENDOCRINE  199    Meds: atorvastatin 20 milliGRAM(s) Oral at bedtime  insulin lispro (HumaLOG) corrective regimen sliding scale   SubCutaneous every 6 hours  levothyroxine Injectable 50 MICROGram(s) IV Push at bedtime  vasopressin Infusion 0.04 Unit(s)/Min IV Continuous <Continuous>        ACCESS DEVICES:  [ ] Peripheral IV  [x] Central Venous Line	[x] R	[x] L	[x] IJ	[ ] Fem	[ ] SC	Placed: 3/22 (R), 4/3 (L)  [x] Arterial Line		[x] R	[ ] L	[ ] Fem	[x] Rad	[ ] Ax	Placed:   [ ] Shiley HD Access             [ ] R [ ] L [ ] IJ  [ ] Fem     Placed:  [x] Urinary Catheter, Date Placed:   Necessity of urinary, arterial, and venous catheters discussed.      CODE STATUS:     IMAGING: HISTORY  75y Female with COVID-19(+) pneumonia requiring intubation.    24 HOUR EVENTS: Left IJ double lumen catheter placed on 4/3 for possible CRRT, Nephrology will follow up.    SUBJECTIVE/ROS: Due to intubation with sedation, subjective information was not able to be obtained from the patient. History was obtained, to the extent possible, from review of the chart and collateral sources of information.      NEURO  Exam: sedated, no acute distress  Meds: acetaminophen    Suspension .. 650 milliGRAM(s) Enteral Tube every 6 hours PRN Temp greater or equal to 38C (100.4F)  HYDROmorphone Infusion 2 mG/Hr IV Continuous <Continuous>  ketamine Infusion 0.126 mG/kG/Hr IV Continuous <Continuous>  propofol Infusion 35.099 MICROgram(s)/kG/Min IV Continuous <Continuous>    Adequacy of sedation and pain control has been assessed and adjusted.      RESPIRATORY  RR: 30 (12 - 30)  SpO2: 96% (89% - 98%)  Mechanical Ventilation: Mode: AC/ CMV (Assist Control/ Continuous Mandatory Ventilation), RR (machine): 30, TV (machine): 400, FiO2: 60, PEEP: 16  Predicted Body Weight: 50.1  Tidal Volume/PBW ratio: 7.98  PaO2 to FiO2 ratio: 225  Extubation readiness assessed.  ABG - ( 02 Apr 2020 22:18 )  pH: 7.06  /  pCO2: 64    /  pO2: 135   / HCO3: 17    / Base Excess: -13.0 /  SaO2: 98      Lactate: x                CARDIOVASCULAR  HR: 68 (68 - 113)  ABP: 126/45 (78/45 - 166/84)  ABP(mean): 72 (57 - 118)  Cardiac Rhythm: sinus  Most recent QTc:   Meds: norepinephrine Infusion (3.72 mL/Hr)        GI/NUTRITION  Diet: Diet, NPO with Tube Feed:   Tube Feeding Modality: Orogastric  Glucerna 1.2 Aj (GLUCERNARTH)  Total Volume for 24 Hours (mL): 480  Continuous  Starting Tube Feed Rate mL per Hour: 20  Until Goal Tube Feed Rate (mL per Hour): 20  Tube Feed Duration (in Hours): 24  Tube Feed Start Time: 12:30 (04-02-20 @ 12:15)    Most recent bowel movement:  Meds/stress ulcer prophylaxis: famotidine Injectable 20 milliGRAM(s) IV Push daily        GENITOURINARY  I&O's Detail    04-01 @ 07:01  -  04-02 @ 07:00  --------------------------------------------------------  IN:    fentaNYL Infusion.: 168.3 mL    Glucerna 1.5: 800 mL    IV PiggyBack: 50 mL    ketamine Infusion: 111.3 mL    norepinephrine Infusion: 215.1 mL    propofol Infusion: 495 mL  Total IN: 1839.7 mL    OUT:    Indwelling Catheter - Urethral: 670 mL  Total OUT: 670 mL    Total NET: 1169.7 mL      04-02 @ 07:01 - 04-03 @ 05:40  --------------------------------------------------------  IN:    bumetanide Infusion: 170 mL    Enteral Tube Flush: 100 mL    fentaNYL Infusion.: 139.2 mL    Glucerna 1.5: 520 mL    HYDROmorphone  Infusion: 32 mL    IV PiggyBack: 100 mL    ketamine Infusion: 315.3 mL    norepinephrine Infusion: 696.9 mL    propofol Infusion: 95.2 mL    propofol Infusion: 357 mL  Total IN: 2525.6 mL    OUT:    Indwelling Catheter - Urethral: 240 mL    Rectal Tube: 50 mL  Total OUT: 290 mL    Total NET: 2235.6 mL          04-03    136  |  101  |  56<H>  ----------------------------<  119<H>  5.2   |  16<L>  |  3.25<H>    Ca    9.6      03 Apr 2020 04:49  Phos  8.5     04-03  Mg     2.9     04-03    TPro  6.7  /  Alb  2.2<L>  /  TBili  0.5  /  DBili  x   /  AST  92<H>  /  ALT  46<H>  /  AlkPhos  213<H>  04-03    [x] Alamo catheter, indication: urine output monitoring in critically ill patient.  Meds:   Creatine Kinase, Serum: 94 (04-03-20 @ 00:39)        HEMATOLOGIC  Meds: heparin  Infusion Syringe 500 Unit(s)/Hr CRRT <Continuous>    VTE Prophylaxis:                         10.8   19.54 )-----------( 182      ( 03 Apr 2020 00:35 )             29.7     PT/INR - ( 03 Apr 2020 00:38 )   PT: 13.7 sec;   INR: 1.19 ratio         PTT - ( 03 Apr 2020 00:38 )  PTT:35.3 sec  [id number] @ 02:40        INFECTIOUS DISEASES  T(C): 36.8, Max: 36.9 (04-02-20 @ 08:00)  WBC Count: 19.54 (04-03-20 @ 00:35)  WBC Count: 12.26 (04-02-20 @ 00:20)    Recent Cultures:  Specimen Source: .Urine Catheterized, 03-31 @ 00:36; Results   No growth; Gram Stain: --; Organism: --  Specimen Source: .Blood Blood-Peripheral, 03-29 @ 18:35; Results   No Growth Final; Gram Stain: --; Organism: --    Meds: influenza   Vaccine 0.5 milliLiter(s) IntraMuscular once    Procalcitonin, Serum: 19.62 ng/mL (04-03-20 @ 00:39)        ENDOCRINE  199    Meds: atorvastatin 20 milliGRAM(s) Oral at bedtime  insulin lispro (HumaLOG) corrective regimen sliding scale   SubCutaneous every 6 hours  levothyroxine Injectable 50 MICROGram(s) IV Push at bedtime  vasopressin Infusion 0.04 Unit(s)/Min IV Continuous <Continuous>        ACCESS DEVICES:  [ ] Peripheral IV  [x] Central Venous Line	[x] R	[x] L	[x] IJ	[ ] Fem	[ ] SC	Placed: 3/22 (R), 4/3 (L)  [x] Arterial Line		[x] R	[ ] L	[ ] Fem	[x] Rad	[ ] Ax	Placed:   [ ] Shiley HD Access             [ ] R [ ] L [ ] IJ  [ ] Fem     Placed:  [x] Urinary Catheter, Date Placed:   Necessity of urinary, arterial, and venous catheters discussed.      CODE STATUS:     IMAGING:

## 2020-04-04 NOTE — PROGRESS NOTE ADULT - ATTENDING COMMENTS
Ketamine and propofol for sedation per COVID ICU protocol   PEEP of 18 and 50 percent oxygen  pH of 7.17.  Increased respiratory rate and lowered PEEP which should improve ventilation  on Dopamine low dose started for severe sinus bradycardia into 20's.  Low dose of Levophed  Lactate 2.6 which is acceptable  on enteral nutrition  continuous RRT with -50 cc removal   updated daughter and spoke to patients spouse.  Agree to not pursue CPR if undergoes cardiac arrest  palliative care involved and planning more detailed discussion  given degree of illness CPR would not be beneficial  45 minutes of critical care management applied

## 2020-04-04 NOTE — PROGRESS NOTE ADULT - ASSESSMENT
Assessment and Plan:   · Assessment		  5F w/ DM2, HTN, HLD, hypothyroidism admitted 3/27 w/ shortness of breath, was hypoxic 86%. 5  days prior had general weakness, fatigue , general headache, body ache, poor appetite. 2 days PTA, developed fever 102.9F at home. On admission pt denied chest pain, abdominal pain, nausea, vomiting, diarrhea, constipation, urinary symptoms.  As per  , there is no history of travel within or outside of the [place], no known sick contact .  Patient went to a party about 2 weeks ago in [place] with the family only and goes to [employer] as need and also drops her children to their school.    Neuro:   - Propofol, Dilaudid PRN, Tylenol, will attempt sedation vacation   - No hx of paralysis     Respiratory:   - Vent: AC//30/16/40%  - P/F: 225  - No hx of proning    CV:   - Started Levo (0.04)  - Net positive fluid status       GI:   - 3/29: Glucerna @45 is goal, will titrate up and check residuals.  - Pepcid BID  - D/c'ed rectal tube --> dc no diarrhea on 3/29    : Cr 0.99  - Will give 40 mg Lasix today (20mg 3/29 --> good UOP)      Heme:   - LVX for dvt ppx    ID: COVID +  - On Hydroxychloroquine and Vit C(3/27-3/31)  - Procal 5.9 (0.54) --> was started on empiric vanc/cefepime (3/29)  - D/dex Vanc  - c/w  cefepime  but will redose for renal failure to 2 g q 12hr     Bcx sent on 3/29  - UA + 3/29  - Order Ucx    Endo: DM2  - SSI Assessment and Plan:   · Assessment		  5F w/ DM2, HTN, HLD, hypothyroidism admitted 3/27 w/ shortness of breath, was hypoxic 86%. 5  days prior had general weakness, fatigue , general headache, body ache, poor appetite. 2 days PTA, developed fever 102.9F at home.=    Neuro:   Propofol @ 35mcg/kg/min, Ketamine @1.2mg/kg/hr, DC Dilaudid @2mg/kg/hr 4/4    Respiratory:   - 400//34/16/50% (4/4)  - ABG 7.18/59/127/21/98%  - desatted after suctioning, bucking vent, SpO2 to 87%  - P/F: 325  - Plateau pressure 30  - No hx of proning    CV:  - Levo 0..8micg/kg/min, Vasso @0.04 micg/kg/min  - Dobamine 5mig/kg/min  -  QTc 438 on 3/29  -Nahun to 29 started dopamine  - Dopamine gtt @ 2.5 mch/kg/min  Goal was 1mcg/kg/min but pt nahun into 60's      GI:   - 3/29: Glucerna @20 - trickle feeds  - Pepcid QD (renally dosed)  - rectal tube DC 4/4    :   - 4/3 CVVHD started via Left IJ DL no fluid removal   - Cr improving now 1.27  from 1.6 ( baseline 1.0)      Heme: LVX for dvt ppx- changed to SQH  - D-dimer up to 4405(4/4/) 1205 (4/1) 586 (3/30)  - CK down to 79 (4/3) from 130 (4/1) from 402 (3/30)  -  (4/3) 798 (4/1) from 1023 (3/30)    ID: COVID + 3/27  - On Hydroxychloroquine and Vit C (3/27-3/31)  -ProCal (4/4) 15.89 < 19:62(04/01)  - WBC downtrend  8.86 (4/4)  -Lactate 2.6 (4/4)  - abx stopped 4/2  - Bcx sent on 3/29 NGTD  - UA + 3/29 UTI   - urine culture (sent 3/30) NGTD      Endo: DM2  - SSI; -130's    Lines: RIJ TLC (3/28), Right radial A line (3/28), LIJ double lumen (4/3)

## 2020-04-04 NOTE — PROGRESS NOTE ADULT - SUBJECTIVE AND OBJECTIVE BOX
[facility] DIVISION OF KIDNEY DISEASES AND HYPERTENSION -- FOLLOW UP NOTE  --------------------------------------------------------------------------------  Chief Complaint: COVID-19 infection    24 hour events/subjective: Pt. seen this AM. Pt. remains sedated, intubated (FiO2 stable at 50, PEEP stable at 16), and on IV vasopressor support. Pt. tolerating CRRT well today. Pt. is oliguric with 280 cc of UOP in the past 24 hours. Unable to complete ROS.    PAST HISTORY  --------------------------------------------------------------------------------  No significant changes to PMH, PSH, FHx, SHx, unless otherwise noted    ALLERGIES & MEDICATIONS  --------------------------------------------------------------------------------  Allergies    lidocaine (Other)    Intolerances      Standing Inpatient Medications  atorvastatin 20 milliGRAM(s) Oral at bedtime  chlorhexidine 0.12% Liquid 15 milliLiter(s) Oral Mucosa every 12 hours  CRRT Treatment    <Continuous>  DOPamine Infusion 1 MICROgram(s)/kG/Min IV Continuous <Continuous>  famotidine Injectable 20 milliGRAM(s) IV Push daily  heparin  Infusion Syringe 500 Unit(s)/Hr CRRT <Continuous>  heparin  Injectable 5000 Unit(s) SubCutaneous every 8 hours  influenza   Vaccine 0.5 milliLiter(s) IntraMuscular once  insulin lispro (HumaLOG) corrective regimen sliding scale   SubCutaneous every 6 hours  ketamine Infusion 0.126 mG/kG/Hr IV Continuous <Continuous>  levothyroxine Injectable 50 MICROGram(s) IV Push at bedtime  norepinephrine Infusion 0.08 MICROgram(s)/kG/Min IV Continuous <Continuous>  petrolatum Ophthalmic Ointment 1 Application(s) Both EYES two times a day  PrismaSATE Dialysate BK 0 / 3.5 5000 milliLiter(s) CRRT <Continuous>  PrismaSOL Filtration BGK 4 / 2.5 5000 milliLiter(s) CRRT <Continuous>  PrismaSOL Filtration BGK 4 / 2.5 5000 milliLiter(s) CRRT <Continuous>  propofol Infusion 30 MICROgram(s)/kG/Min IV Continuous <Continuous>  vasopressin Infusion 0.04 Unit(s)/Min IV Continuous <Continuous>    PRN Inpatient Medications  acetaminophen    Suspension .. 650 milliGRAM(s) Enteral Tube every 6 hours PRN      REVIEW OF SYSTEMS  --------------------------------------------------------------------------------  Unable to obtain    VITALS/PHYSICAL EXAM  --------------------------------------------------------------------------------  T(C): 33.3 (04-04-20 @ 04:00), Max: 36 (04-03-20 @ 16:00)  HR: 81 (04-04-20 @ 12:00) (44 - 110)  BP: --  RR: 17 (04-04-20 @ 12:00) (17 - 34)  SpO2: 95% (04-04-20 @ 12:00) (89% - 98%)  Wt(kg): --    04-03-20 @ 07:01  -  04-04-20 @ 07:00  --------------------------------------------------------  IN: 895.2 mL / OUT: 2012 mL / NET: -1116.8 mL    04-04-20 @ 07:01  -  04-04-20 @ 13:17  --------------------------------------------------------  IN: 182.3 mL / OUT: 533 mL / NET: -350.7 mL    PE: Deferred due to COVID-19 infection and preservation of PPE    LABS/STUDIES  --------------------------------------------------------------------------------              11.7   8.68  >-----------<  146      [04-04-20 @ 02:38]              34.8     138  |  99  |  20  ----------------------------<  142      [04-04-20 @ 02:38]  3.9   |  17  |  1.27        Ca     9.2     [04-04-20 @ 02:38]      Mg     2.4     [04-04-20 @ 02:38]      Phos  4.6     [04-04-20 @ 02:38]    TPro  7.0  /  Alb  2.3  /  TBili  0.6  /  DBili  x   /  AST  109  /  ALT  53  /  AlkPhos  234  [04-04-20 @ 02:38]    PT/INR: PT 13.7 , INR 1.19       [04-03-20 @ 00:38]  PTT: 35.3       [04-03-20 @ 00:38]    CK 79      [04-03-20 @ 12:36]        [04-03-20 @ 00:39]    Creatinine Trend:  SCr 1.27 [04-04 @ 02:38]  SCr 1.60 [04-03 @ 17:48]  SCr 2.12 [04-03 @ 12:36]  SCr 3.25 [04-03 @ 04:49]  SCr 3.87 [04-03 @ 00:39]

## 2020-04-04 NOTE — PROGRESS NOTE ADULT - PROBLEM SELECTOR PLAN 1
Pt. with likely hemodynamically mediated YELITZA in the setting of low BP and COVID-19 infection. Pt. with likely ATN. Pt. initiated CRRT on 4/3/2020 due to oliguria and fluid overload. Tolerating CRRT well today. Plan to continue CRRT today. Please check HbBsAg. Monitor labs and urine output. Avoid potential nephrotoxins.

## 2020-04-04 NOTE — PROGRESS NOTE ADULT - SUBJECTIVE AND OBJECTIVE BOX
HISTORY  75y Female with COVID-19(+) pneumonia requiring intubation.    24 HOUR EVENTS:    SUBJECTIVE/ROS: Due to intubation with sedation, subjective information was not able to be obtained from the patient. History was obtained, to the extent possible, from review of the chart and collateral sources of information.      NEURO  Exam: sedated, no acute distress  Meds: acetaminophen    Suspension .. 650 milliGRAM(s) Enteral Tube every 6 hours PRN Temp greater or equal to 38C (100.4F)  HYDROmorphone Infusion 2 mG/Hr IV Continuous <Continuous>  ketamine Infusion 0.126 mG/kG/Hr IV Continuous <Continuous>  propofol Infusion 35.099 MICROgram(s)/kG/Min IV Continuous <Continuous>    Adequacy of sedation and pain control has been assessed and adjusted.      RESPIRATORY  RR: 33 (0 - 33)  SpO2: 95% (80% - 99%)  Exam: without dissynchrony  Mechanical Ventilation: Mode: AC/ CMV (Assist Control/ Continuous Mandatory Ventilation), RR (machine): 34, TV (machine): 400, FiO2: 50, PEEP: 18  Predicted Body Weight:  Tidal Volume/PBW ratio:  PaO2 to FiO2 ratio:  Extubation readiness assessed.  ABG - ( 03 Apr 2020 22:30 )  pH: 7.12  /  pCO2: 70    /  pO2: 120   / HCO3: 22    / Base Excess: -8.7  /  SaO2: 95      Lactate: x                CARDIOVASCULAR  HR: 107 (40 - 108)  ABP: 125/56 (98/35 - 163/51)  ABP(mean): 81 (56 - 95)  Cardiac Rhythm: sinus  Most recent QTc: 12 Lead ECG:   Ventricular Rate 79 BPM    Atrial Rate 79 BPM    P-R Interval 166 ms    QRS Duration 88 ms    Q-T Interval 388 ms    QTC Calculation(Bezet) 444 ms    P Axis 34 degrees    R Axis 6 degrees    T Axis 38 degrees    Diagnosis Line NORMAL SINUS RHYTHM WITH SINUS ARRHYTHMIA  LOW VOLTAGE QRS      Confirmed by SANTOS OLSON MD ([id number]) on 4/3/2020 10:15:31 AM (03-31 @ 18:45)    Meds: DOPamine Infusion (14.9 mL/Hr)  norepinephrine Infusion (3.72 mL/Hr)        GI/NUTRITION  Diet: Diet, NPO with Tube Feed:   Tube Feeding Modality: Orogastric  Glucerna 1.2 Aj (GLUCERNARTH)  Total Volume for 24 Hours (mL): 480  Continuous  Starting Tube Feed Rate mL per Hour: 20  Until Goal Tube Feed Rate (mL per Hour): 20  Tube Feed Duration (in Hours): 24  Tube Feed Start Time: 12:30 (04-02-20 @ 12:15)    Most recent bowel movement:  Meds/stress ulcer prophylaxis: famotidine Injectable 20 milliGRAM(s) IV Push daily        GENITOURINARY  I&O's Detail    04-02 @ 07:01  -  04-03 @ 07:00  --------------------------------------------------------  IN:    bumetanide Infusion: 170 mL    Enteral Tube Flush: 100 mL    fentaNYL Infusion.: 139.2 mL    Glucerna 1.5: 560 mL    HYDROmorphone  Infusion: 38 mL    IV PiggyBack: 100 mL    ketamine Infusion: 363 mL    norepinephrine Infusion: 21.6 mL    norepinephrine Infusion: 741.5 mL    propofol Infusion: 157.1 mL    propofol Infusion: 357 mL    vasopressin Infusion: 4.8 mL  Total IN: 2752.2 mL    OUT:    Indwelling Catheter - Urethral: 255 mL    Rectal Tube: 55 mL  Total OUT: 310 mL    Total NET: 2442.2 mL      04-03 @ 07:01  -  04-04 @ 02:33  --------------------------------------------------------  IN:    DOPamine Infusion: 119.2 mL    Glucerna 1.5: 280 mL    HYDROmorphone  Infusion: 28 mL    ketamine Infusion: 192.8 mL    norepinephrine Infusion: 91.7 mL    propofol Infusion: 104 mL    vasopressin Infusion: 33.6 mL  Total IN: 849.3 mL    OUT:    Indwelling Catheter - Urethral: 35 mL    Other: 1328 mL  Total OUT: 1363 mL    Total NET: -513.7 mL          04-03    135  |  100  |  29<H>  ----------------------------<  134<H>  3.8   |  18<L>  |  1.60<H>    Ca    9.2      03 Apr 2020 17:48  Phos  8.5     04-03  Mg     2.4     04-03    TPro  6.3  /  Alb  2.0<L>  /  TBili  0.8  /  DBili  x   /  AST  97<H>  /  ALT  48<H>  /  AlkPhos  206<H>  04-03    [x] [place]amo catheter, indication: urine output monitoring in critically ill patient.  Meds:   Creatine Kinase, Serum: 79 (04-03-20 @ 12:36)        HEMATOLOGIC  Meds/VTE prophylaxis: heparin  Infusion Syringe 500 Unit(s)/Hr CRRT <Continuous>  heparin  Injectable 5000 Unit(s) SubCutaneous every 8 hours                          10.7   8.12  )-----------( 132      ( 03 Apr 2020 17:48 )             33.6     PT/INR - ( 03 Apr 2020 00:38 )   PT: 13.7 sec;   INR: 1.19 ratio         PTT - ( 03 Apr 2020 00:38 )  PTT:35.3 sec  [id number] @ 02:40        INFECTIOUS DISEASES  T(C): 36, Max: 37 (04-03-20 @ 09:00)  WBC Count: 8.12 (04-03-20 @ 17:48)  WBC Count: 12.48 (04-03-20 @ 12:36)    Recent Cultures:  Specimen Source: .Urine Catheterized, 03-31 @ 00:36; Results   No growth; Gram Stain: --; Organism: --  Specimen Source: .Blood Blood-Peripheral, 03-29 @ 18:35; Results   No Growth Final; Gram Stain: --; Organism: --    Meds: influenza   Vaccine 0.5 milliLiter(s) IntraMuscular once    Procalcitonin, Serum: 19.62 ng/mL (04-03-20 @ 00:39)        ENDOCRINE  Fingersticks: 199  Meds: atorvastatin 20 milliGRAM(s) Oral at bedtime  insulin lispro (HumaLOG) corrective regimen sliding scale   SubCutaneous every 6 hours  levothyroxine Injectable 50 MICROGram(s) IV Push at bedtime  vasopressin Infusion 0.04 Unit(s)/Min IV Continuous <Continuous>        ACCESS DEVICES:  [ ] Peripheral IV  [x] Central Venous Line	[ ] R	[ ] L	[ ] IJ	[ ] Fem	[ ] SC	Placed:   [x] Arterial Line		[ ] R	[ ] L	[ ] Fem	[ ] Rad	[ ] Ax	Placed:   [ ] Anshul HD Access             [ ] R [ ] L [ ] IJ  [ ] Fem     Placed:  [x] Urinary Catheter, Date Placed:   Necessity of urinary, arterial, and venous catheters discussed.      CODE STATUS:  [Active]      IMAGING:

## 2020-04-05 NOTE — PROGRESS NOTE ADULT - ASSESSMENT
74 y/o female w/ a PMHx of HTN, HLD, DM type II, and hypothyroidism presenting with COVID-19 pneumonia c/b acute hypoxic respiratory failure requiring intubation and YELITZA requiring CRRT.    PLAN:    Neuro: 76 y/o female w/ a PMHx of HTN, HLD, DM type II, and hypothyroidism presenting with COVID-19 pneumonia c/b acute hypoxic respiratory failure requiring intubation and YELITZA requiring CRRT.    PLAN:    Neuro: intubated  - Sedation with ketamine and propofol infusions with goal RASS -3 to -5, will not wean sedation at this time due to high PEEP requirements    Resp: COVID-19 pneumonia c/b ARDS  - Monitor pulse oximeter  - Mechanical ventilator for acute hypoxic respiratory failure secondary to COVID-19 pneumonia c/b ARDS  - Monitor peak/plateau pressure  - Monitor P/F ratio    CV: septic shock, episode of bradycardia  - Monitor vital signs  - Wean dopamine and vasopressin infusions as tolerated with goal MAP greater than 65 mmHg    GI: no acute issues  - Jevity at 30 mL/hr via OGT, will increase to goal today  - Pepcid for stress ulcer prophylaxis  - Holding bowel regimen as patient has diarrhea w/ rectal tube in place     Renal: acute renal failure (baseline Cr unknown)  - Monitor I&Os  - Monitor electrolytes and replete as necessary  - CRRT as per nephrology for acute renal failure    Heme: no acute issues  - Monitor CBC and coags  - SQH for VTE prophylaxis    ID: COVID-19 pneumonia  - Monitor WBC, temperature, and procalcitonin  - Completed course of hydroxychlorquine from 3/27-4/1    Endo: DM type II, HLD, hypothyroidism  - Monitor glucose with ISS q6hrs for glycemic control, HgbA1C 6.3% on 3/28  - Home Lipitor  - Home Synthroid    Disposition:  - Full code  - Will remain in [facility] ICU    Fabienne Catherine PA-C    [id number] 74 y/o female w/ a PMHx of HTN, HLD, DM type II, and hypothyroidism presenting with COVID-19 pneumonia c/b acute hypoxic respiratory failure requiring intubation and YELITZA requiring CRRT.    PLAN:    Neuro: intubated  - Sedation with ketamine and propofol infusions with goal RASS -3 to -5, will not wean sedation at this time due to high PEEP requirements    Resp: COVID-19 pneumonia c/b ARDS  - Monitor pulse oximeter  - Mechanical ventilator for acute hypoxic respiratory failure secondary to COVID-19 pneumonia c/b ARDS  - Monitor peak/plateau pressure  - Monitor P/F ratio    CV: septic shock, episode of bradycardia  - Monitor vital signs  - Wean dopamine and levophed infusions as tolerated with goal MAP greater than 65 mmHg    GI: no acute issues  - Jevity at 20 mL/hr via OGT, tolerating  - Pepcid for stress ulcer prophylaxis  - Holding bowel regimen as patient has diarrhea w/ rectal tube in place     Renal: acute renal failure (baseline Cr unknown)  - Monitor I&Os  - Monitor electrolytes and replete as necessary  - CRRT as per nephrology for acute renal failure, running net even at this time    Heme: no acute issues  - Monitor CBC and coags  - SQH for VTE prophylaxis    ID: COVID-19 pneumonia  - Monitor WBC, temperature, and procalcitonin  - Completed course of hydroxychlorquine from 3/27-4/1    Endo: DM type II, HLD, hypothyroidism  - Monitor glucose with ISS q6hrs for glycemic control, HgbA1C 6.3% on 3/28  - Home Lipitor  - Home Synthroid    Disposition:  - DNR  - Will remain in COVID ICU    Fabienne Catherine PA-C    [id number]  [facility] [place]

## 2020-04-05 NOTE — DISCHARGE NOTE FOR THE EXPIRED PATIENT - HOSPITAL COURSE
3/27: Admitted  3/28: Intubated  3/29 - : Remained in ICU with multisystem organ failure. Mechanical ventilation was provided for respiratory support. Patient remained intubated and sedated throughout ICU stay. Vasopressors were used for cardiovascular support. CVVH was utilized for acute renal failure. Despite maximal medical interventions and support the patient continued to deteriorate. She was eventually made DNR.  : Patient  at 2315.

## 2020-04-05 NOTE — PROGRESS NOTE ADULT - ATTENDING COMMENTS
This patient is progressively worsening  now with Lactic acidoses  difficult to ventilate due to poor lung compliance- reflective of severe ARDS pattern  MSOF - Renal failure, respiratory failure  anticipate patient to further worsen   I have had multiple discussions with the daughter who advocates for patients   they do not want chest compressions - this would be a futile endeavor  The family agrees to try additional 24 hours and if does not respond likely withdraw care This patient is progressively worsening  now with Lactic acidoses  difficult to ventilate due to poor lung compliance- reflective of severe ARDS pattern  MSOF - Renal failure, respiratory failure  anticipate patient to further worsen   I have had multiple discussions with the daughter who advocates for patients   they do not want chest compressions - this would be a futile endeavor  I reviewed my concerns with ethics committee and they advised not to pursue futile interventions  The family agrees to try additional 24 hours and if does not respond likely withdraw care  45 minutes of critical care management applied

## 2020-04-05 NOTE — PROGRESS NOTE ADULT - REASON FOR ADMISSION
ARDS
fatigue x 5 days, and fever X 2 days .  per EMS pt spo2 was 86%on RA

## 2020-04-05 NOTE — DISCHARGE NOTE FOR THE EXPIRED PATIENT - REASON FOR ADMISSION
76 yo F with past history of diabetes mellitus, hypertension, hyperlipidemia, and hypothyroidism who was admitted for acute hypoxic respiratory failure secondary to COVID-19 infection.

## 2020-04-05 NOTE — PROGRESS NOTE ADULT - SUBJECTIVE AND OBJECTIVE BOX
HISTORY  74 y/o female w/ a PMHx of HTN, HLD, DM type II, and hypothyroidism who presented to the ED on 3/27 after ~5 days of weakness, fatigue, and poor PO intake and ~2 days of fevers and dyspnea. EMS found that patient was hypoxemic with SpO2 of 86% on room air. She was admitted to the floors but eventually went into acute hypoxic respiratory failure requiring intubation on 3/28. She completed a 5 day course of hydroxychlorquine from 3/27-4/1. HISTORY  76 y/o female w/ a PMHx of HTN, HLD, DM type II, and hypothyroidism who presented to the ED on 3/27 after ~5 days of weakness, fatigue, and poor PO intake and ~2 days of fevers and dyspnea. EMS found that patient was hypoxemic with SpO2 of 86% on room air. Patient found to have COVID-19 pneumonia. She was admitted to the floors but eventually went into acute hypoxic respiratory failure requiring intubation on 3/28 and was transferred to [facility] ICU in PICU for further management. She completed a 5 day course of hydroxychlorquine from 3/27-4/1. Hospital course complicated by YELITZA requiring CRRT. Patient remains intubated and sedated so unable to obtain ROS.    24 HOUR EVENTS:  - Remains on CRRT  - Discontinued Dilaudid infusion for sedation  - Attempted to wean PEEP from 18 to 16 but patient desaturated to high 80s  - Hypothermic  - Lactate trending upwards 2.6 -> 2.8 -> 3.2    SUBJECTIVE/ROS: Due to intubation with sedation, subjective information was not able to be obtained from the patient. History was obtained, to the extent possible, from review of the chart and collateral sources of information.    NEURO  RASS: -5  Exam: sedated, does not respond to noxious stimuli  Meds:  - ketamine Infusion 0.5 mG/kG/Hr IV Continuous <Continuous>  - propofol Infusion 30 MICROgram(s)/kG/Min IV Continuous <Continuous>  Adequacy of sedation and pain control has been assessed and adjusted.    RESPIRATORY  RR: 17 (17 - 34)  SpO2: 92% (92% - 97%)  Exam: coarse rhonchi in all lung fields  Mechanical Ventilation: Mode: AC/ CMV (Assist Control/ Continuous Mandatory Ventilation), RR (machine): 34, TV (machine): 400, FiO2: 50, PEEP: 18  ABG - ( 05 Apr 2020 01:12 )  pH: 7.24  /  pCO2: 53    /  pO2: 64    / HCO3: 22    / Base Excess: -4.8  /  SaO2: 89    /    Lactate: 3.2  PaO2 to FiO2 ratio: 128  Peak Pressure: 37  Plateau Pressure: 36  Patient not a candidate for a SBT at this time due to ventilator requirements    CARDIOVASCULAR  HR: 73 (64 - 110)  ABP: 128/43 (116/53 - 134/51)  ABP(mean): 70 (70 - 80)  Exam: regualr rate and rhythm, S1S2  Cardiac Rhythm: sinus  Meds:  - DOPamine Infusion 2.5 MICROgram(s)/kG/Min IV Continuous <Continuous>  - norepinephrine Infusion 0.04 MICROgram(s)/kG/Min IV Continuous <Continuous>  - vasopressin Infusion 0.04 Unit(s)/Min IV Continuous <Continuous>    GI/NUTRITION  Diet: Diet, NPO with Tube Feed:   Tube Feeding Modality: Orogastric  Glucerna 1.2 Aj (GLUCERNARTH)  Total Volume for 24 Hours (mL): 480  Continuous Goal Tube Feed Rate (mL per Hour): 20  Tube Feed Duration (in Hours): 24    Most recent bowel movement: rectal tube with continuous output  Meds: famotidine Injectable 20 milliGRAM(s) IV Push daily    GENITOURINARY  I&O's Detail  04-04 @ 07:01  -  04-05 @ 04:10  --------------------------------------------------------  IN:    DOPamine Infusion: 59.6 mL    DOPamine Infusion: 74 mL    Glucerna 1.5: 280 mL    HYDROmorphone  Infusion: 8 mL    ketamine Infusion: 12 mL    ketamine Infusion: 1 mL    ketamine Infusion: 4 mL    norepinephrine Infusion: 27.5 mL    norepinephrine Infusion: 42.9 mL    propofol Infusion: 66.8 mL    propofol Infusion: 167 mL    vasopressin Infusion: 28.8 mL    vasopressin Infusion: 4.8 mL  Total IN: 776.4 mL    OUT:    Indwelling Catheter - Urethral: 5 mL    Other: 1202 mL  Total OUT: 1207 mL    Total NET: -430.6 mL    135  |  99  |  14  ----------------------------<  152<H>  4.2   |  18<L>  |  0.81    Ca    9.2      05 Apr 2020 01:32  Phos  3.2  Mg     2.5  TPro  6.3  /  Alb  1.9<L>  /  TBili  0.5  /  DBili  0.3<H>  /  AST  159<H>  /  ALT  66<H>  /  AlkPhos  254<H>    [x] Alamo catheter, indication: urine output monitoring in critically ill  Meds:  - CRRT Treatment    <Continuous>  - heparin  Infusion Syringe 500 Unit(s)/Hr CRRT <Continuous>    HEMATOLOGIC  Meds: heparin  Injectable 5000 Unit(s) SubCutaneous every 8 hours                        10.8   7.02  )-----------( 109      ( 05 Apr 2020 01:32 )             32.1     INFECTIOUS DISEASES  T(C): 33, Max: 33.3 (04-04-20 @ 16:00)  WBC Count: 7.02 (04-05-20 @ 01:32)  Procalcitonin, Serum: 15.57 ng/mL (04-05-20 @ 01:32)    Recent Cultures:  - Specimen Source: .Urine Catheterized, 03-31 @ 00:36  Results: No growth  Gram Stain: --  Organism: --  - Specimen Source: .Blood Blood-Peripheral, 03-29 @ 18:35  Results: No Growth Final  Gram Stain: --  Organism: --  Meds: acetaminophen    Suspension .. 650 milliGRAM(s) Enteral Tube every 6 hours PRN Temp greater or equal to 38C (100.4F)    ENDOCRINE  Fingersticks:  - 120 mg/dL (04 Apr 2020 12:29)  - 112 mg/dL (04 Apr 2020 05:26)  Meds:  - atorvastatin 20 milliGRAM(s) Oral at bedtime  - insulin lispro (HumaLOG) corrective regimen sliding scale   SubCutaneous every 6 hours  - levothyroxine Injectable 50 MICROGram(s) IV Push at bedtime    ACCESS DEVICES:  [x] Peripheral IV  [x] Central Venous Line		[x] R	[ ] L	[x] IJ	[ ] Fem	 [ ] SC		Placed: 3/28  [x] Arterial Line			[x] R	[ ] L	[ ] Fem	[x] Rad	 [ ] Ax		Placed: 3/28  [ ] Shiley HD Access		[ ] R 	[ ] L 	[ ] IJ  	[ ] Fem			Placed:  [x] Urinary Catheter, Date Placed: 3/28  Necessity of urinary, arterial, and venous catheters discussed.    CODE STATUS: DNR    IMAGING: HISTORY  74 y/o female w/ a PMHx of HTN, HLD, DM type II, and hypothyroidism who presented to the ED on 3/27 after ~5 days of weakness, fatigue, and poor PO intake and ~2 days of fevers and dyspnea. EMS found that patient was hypoxemic with SpO2 of 86% on room air. Patient found to have COVID-19 pneumonia. She was admitted to the floors but eventually went into acute hypoxic respiratory failure requiring intubation on 3/28 and was transferred to [facility] ICU in PICU for further management. She completed a 5 day course of hydroxychlorquine from 3/27-4/1. Hospital course complicated by YELITZA requiring CRRT. Patient remains intubated and sedated so unable to obtain ROS.    24 HOUR EVENTS:  - Remains on CRRT  - Discontinued Dilaudid infusion for sedation  - Attempted to wean PEEP from 18 to 16 but patient desaturated to high 80s  - Hypothermic  - Lactate trending upwards 2.6 -> 2.8 -> 3.2    SUBJECTIVE/ROS: Due to intubation with sedation, subjective information was not able to be obtained from the patient. History was obtained, to the extent possible, from review of the chart and collateral sources of information.    NEURO  RASS: -5  Exam: sedated, does not respond to noxious stimuli  Meds:  - ketamine Infusion 0.5 mG/kG/Hr IV Continuous <Continuous>  - propofol Infusion 30 MICROgram(s)/kG/Min IV Continuous <Continuous>  Adequacy of sedation and pain control has been assessed and adjusted.    RESPIRATORY  RR: 17 (17 - 34)  SpO2: 92% (92% - 97%)  Exam: coarse rhonchi in all lung fields  Mechanical Ventilation: Mode: AC/ CMV (Assist Control/ Continuous Mandatory Ventilation), RR (machine): 34, TV (machine): 400, FiO2: 50, PEEP: 18  ABG - ( 05 Apr 2020 01:12 )  pH: 7.24  /  pCO2: 53    /  pO2: 64    / HCO3: 22    / Base Excess: -4.8  /  SaO2: 89    /    Lactate: 3.2  PaO2 to FiO2 ratio: 128  Peak Pressure: 37  Plateau Pressure: 36  Patient not a candidate for a SBT at this time due to ventilator requirements    CARDIOVASCULAR  HR: 73 (64 - 110)  ABP: 128/43 (116/53 - 134/51)  ABP(mean): 70 (70 - 80)  Exam: regualr rate and rhythm, S1S2  Cardiac Rhythm: sinus  Meds:  - DOPamine Infusion 2.5 MICROgram(s)/kG/Min IV Continuous <Continuous>  - norepinephrine Infusion 0.04 MICROgram(s)/kG/Min IV Continuous <Continuous>  - vasopressin Infusion 0.04 Unit(s)/Min IV Continuous <Continuous>    GI/NUTRITION  Diet: Diet, NPO with Tube Feed:   Tube Feeding Modality: Orogastric  Glucerna 1.2 Aj (GLUCERNARTH)  Total Volume for 24 Hours (mL): 480  Continuous Goal Tube Feed Rate (mL per Hour): 20  Tube Feed Duration (in Hours): 24    Most recent bowel movement: rectal tube with continuous output  Meds: famotidine Injectable 20 milliGRAM(s) IV Push daily    GENITOURINARY  I&O's Detail  04-04 @ 07:01  -  04-05 @ 04:10  --------------------------------------------------------  IN:    DOPamine Infusion: 59.6 mL    DOPamine Infusion: 74 mL    Glucerna 1.5: 280 mL    HYDROmorphone  Infusion: 8 mL    ketamine Infusion: 12 mL    ketamine Infusion: 1 mL    ketamine Infusion: 4 mL    norepinephrine Infusion: 27.5 mL    norepinephrine Infusion: 42.9 mL    propofol Infusion: 66.8 mL    propofol Infusion: 167 mL    vasopressin Infusion: 28.8 mL    vasopressin Infusion: 4.8 mL  Total IN: 776.4 mL    OUT:    Indwelling Catheter - Urethral: 5 mL    Other: 1202 mL  Total OUT: 1207 mL    Total NET: -430.6 mL    135  |  99  |  14  ----------------------------<  152<H>  4.2   |  18<L>  |  0.81    Ca    9.2      05 Apr 2020 01:32  Phos  3.2  Mg     2.5  TPro  6.3  /  Alb  1.9<L>  /  TBili  0.5  /  DBili  0.3<H>  /  AST  159<H>  /  ALT  66<H>  /  AlkPhos  254<H>    [x] Alamo catheter, indication: urine output monitoring in critically ill  Meds:  - CRRT Treatment    <Continuous>  - heparin  Infusion Syringe 500 Unit(s)/Hr CRRT <Continuous>    HEMATOLOGIC  Meds: heparin  Injectable 5000 Unit(s) SubCutaneous every 8 hours                        10.8   7.02  )-----------( 109      ( 05 Apr 2020 01:32 )             32.1     INFECTIOUS DISEASES  T(C): 33, Max: 33.3 (04-04-20 @ 16:00)  WBC Count: 7.02 (04-05-20 @ 01:32)  Procalcitonin, Serum: 15.57 ng/mL (04-05-20 @ 01:32)    Recent Cultures:  - Specimen Source: .Urine Catheterized, 03-31 @ 00:36  Results: No growth  Gram Stain: --  Organism: --  - Specimen Source: .Blood Blood-Peripheral, 03-29 @ 18:35  Results: No Growth Final  Gram Stain: --  Organism: --  Meds: acetaminophen    Suspension .. 650 milliGRAM(s) Enteral Tube every 6 hours PRN Temp greater or equal to 38C (100.4F)    ENDOCRINE  Fingersticks:  - 120 mg/dL (04 Apr 2020 12:29)  - 112 mg/dL (04 Apr 2020 05:26)  Meds:  - atorvastatin 20 milliGRAM(s) Oral at bedtime  - insulin lispro (HumaLOG) corrective regimen sliding scale   SubCutaneous every 6 hours  - levothyroxine Injectable 50 MICROGram(s) IV Push at bedtime    ACCESS DEVICES:  [x] Peripheral IV  [x] Central Venous Line		[x] R	[ ] L	[x] IJ	[ ] Fem	 [ ] SC		Placed: 3/28  [x] Arterial Line			[x] R	[ ] L	[ ] Fem	[x] Rad	 [ ] Ax		Placed: 3/28  [ ] Shiley HD Access		[ ] R 	[x] L 	[x] IJ  	[ ] Fem			Placed: 4/2  [x] Urinary Catheter, Date Placed: 3/28  Necessity of urinary, arterial, and venous catheters discussed.    CODE STATUS: DNR    IMAGING: HISTORY  74 y/o female w/ a PMHx of HTN, HLD, DM type II, and hypothyroidism who presented to the ED on 3/27 after ~5 days of weakness, fatigue, and poor PO intake and ~2 days of fevers and dyspnea. EMS found that patient was hypoxemic with SpO2 of 86% on room air. Patient found to have COVID-19 pneumonia. She was admitted to the floors but eventually went into acute hypoxic respiratory failure requiring intubation on 3/28 and was transferred to [facility] ICU in PICU for further management. She completed a 5 day course of hydroxychlorquine from 3/27-4/1. Hospital course complicated by YELITZA requiring CRRT. Patient remains intubated and sedated so unable to obtain ROS.    24 HOUR EVENTS:  - Remains on CRRT  - Discontinued Dilaudid infusion for sedation  - Attempted to wean PEEP from 18 to 16 but patient desaturated to high 80s  - Hypothermic  - Lactate trending upwards 2.6 -> 2.8 -> 3.2    SUBJECTIVE/ROS: Due to intubation with sedation, subjective information was not able to be obtained from the patient. History was obtained, to the extent possible, from review of the chart and collateral sources of information.    NEURO  RASS: -5  Exam: sedated, does not respond to noxious stimuli  Meds:  - ketamine Infusion 0.5 mG/kG/Hr IV Continuous <Continuous>  - propofol Infusion 30 MICROgram(s)/kG/Min IV Continuous <Continuous>  Adequacy of sedation and pain control has been assessed and adjusted.    RESPIRATORY  RR: 17 (17 - 34)  SpO2: 92% (92% - 97%)  Exam: coarse rhonchi in all lung fields  Mechanical Ventilation: Mode: AC/ CMV (Assist Control/ Continuous Mandatory Ventilation), RR (machine): 34, TV (machine): 400, FiO2: 50, PEEP: 16  ABG - ( 05 Apr 2020 01:12 )  pH: 7.24  /  pCO2: 53    /  pO2: 64    / HCO3: 22    / Base Excess: -4.8  /  SaO2: 89    /    Lactate: 3.2  PaO2 to FiO2 ratio: 128  Peak Pressure: 37  Plateau Pressure: 36  Patient not a candidate for a SBT at this time due to ventilator requirements    CARDIOVASCULAR  HR: 73 (64 - 110)  ABP: 128/43 (116/53 - 134/51)  ABP(mean): 70 (70 - 80)  Exam: regualr rate and rhythm, S1S2  Cardiac Rhythm: sinus  Meds:  - DOPamine Infusion 2.5 MICROgram(s)/kG/Min IV Continuous <Continuous>  - norepinephrine Infusion 0.04 MICROgram(s)/kG/Min IV Continuous <Continuous>  - vasopressin Infusion 0.04 Unit(s)/Min IV Continuous <Continuous>    GI/NUTRITION  Diet: Diet, NPO with Tube Feed:   Tube Feeding Modality: Orogastric  Glucerna 1.2 Aj (GLUCERNARTH)  Total Volume for 24 Hours (mL): 480  Continuous Goal Tube Feed Rate (mL per Hour): 20  Tube Feed Duration (in Hours): 24    Most recent bowel movement: rectal tube with continuous output  Meds: famotidine Injectable 20 milliGRAM(s) IV Push daily    GENITOURINARY  I&O's Detail  04-04 @ 07:01  -  04-05 @ 04:10  --------------------------------------------------------  IN:    DOPamine Infusion: 59.6 mL    DOPamine Infusion: 74 mL    Glucerna 1.5: 280 mL    HYDROmorphone  Infusion: 8 mL    ketamine Infusion: 12 mL    ketamine Infusion: 1 mL    ketamine Infusion: 4 mL    norepinephrine Infusion: 27.5 mL    norepinephrine Infusion: 42.9 mL    propofol Infusion: 66.8 mL    propofol Infusion: 167 mL    vasopressin Infusion: 28.8 mL    vasopressin Infusion: 4.8 mL  Total IN: 776.4 mL    OUT:    Indwelling Catheter - Urethral: 5 mL    Other: 1202 mL  Total OUT: 1207 mL    Total NET: -430.6 mL    135  |  99  |  14  ----------------------------<  152<H>  4.2   |  18<L>  |  0.81    Ca    9.2      05 Apr 2020 01:32  Phos  3.2  Mg     2.5  TPro  6.3  /  Alb  1.9<L>  /  TBili  0.5  /  DBili  0.3<H>  /  AST  159<H>  /  ALT  66<H>  /  AlkPhos  254<H>    [x] Alamo catheter, indication: urine output monitoring in critically ill  Meds:  - CRRT Treatment    <Continuous>  - heparin  Infusion Syringe 500 Unit(s)/Hr CRRT <Continuous>    HEMATOLOGIC  Meds: heparin  Injectable 5000 Unit(s) SubCutaneous every 8 hours                        10.8   7.02  )-----------( 109      ( 05 Apr 2020 01:32 )             32.1     INFECTIOUS DISEASES  T(C): 33, Max: 33.3 (04-04-20 @ 16:00)  WBC Count: 7.02 (04-05-20 @ 01:32)  Procalcitonin, Serum: 15.57 ng/mL (04-05-20 @ 01:32)    Recent Cultures:  - Specimen Source: .Urine Catheterized, 03-31 @ 00:36  Results: No growth  Gram Stain: --  Organism: --  - Specimen Source: .Blood Blood-Peripheral, 03-29 @ 18:35  Results: No Growth Final  Gram Stain: --  Organism: --  Meds: acetaminophen    Suspension .. 650 milliGRAM(s) Enteral Tube every 6 hours PRN Temp greater or equal to 38C (100.4F)    ENDOCRINE  Fingersticks:  - 120 mg/dL (04 Apr 2020 12:29)  - 112 mg/dL (04 Apr 2020 05:26)  Meds:  - atorvastatin 20 milliGRAM(s) Oral at bedtime  - insulin lispro (HumaLOG) corrective regimen sliding scale   SubCutaneous every 6 hours  - levothyroxine Injectable 50 MICROGram(s) IV Push at bedtime    ACCESS DEVICES:  [x] Peripheral IV  [x] Central Venous Line		[x] R	[ ] L	[x] IJ	[ ] Fem	 [ ] SC		Placed: 3/28  [x] Arterial Line			[x] R	[ ] L	[ ] Fem	[x] Rad	 [ ] Ax		Placed: 3/28  [ ] Shiley HD Access		[ ] R 	[x] L 	[x] IJ  	[ ] Fem			Placed: 4/2  [x] Urinary Catheter, Date Placed: 3/28  Necessity of urinary, arterial, and venous catheters discussed.    CODE STATUS: DNR    IMAGING: HISTORY  76 y/o female w/ a PMHx of HTN, HLD, DM type II, and hypothyroidism who presented to the ED on 3/27 after ~5 days of weakness, fatigue, and poor PO intake and ~2 days of fevers and dyspnea. EMS found that patient was hypoxemic with SpO2 of 86% on room air. Patient found to have COVID-19 pneumonia. She was admitted to the floors but eventually went into acute hypoxic respiratory failure requiring intubation on 3/28 and was transferred to [facility] ICU in PICU for further management. She completed a 5 day course of hydroxychlorquine from 3/27-4/1. Hospital course complicated by YELITZA requiring CRRT. Patient remains intubated and sedated so unable to obtain ROS.    24 HOUR EVENTS:  - Remains on CRRT  - Discontinued Dilaudid infusion for sedation  - Attempted to wean PEEP from 18 to 16 but patient desaturated to high 80s  - Hypothermic  - Lactate trending upwards 2.6 -> 2.8 -> 3.2  - Weaned norepinephrine infusion, only on dopamine infusion now    SUBJECTIVE/ROS: Due to intubation with sedation, subjective information was not able to be obtained from the patient. History was obtained, to the extent possible, from review of the chart and collateral sources of information.    NEURO  RASS: -5  Exam: sedated, does not respond to noxious stimuli  Meds:  - ketamine Infusion 0.5 mG/kG/Hr IV Continuous <Continuous>  - propofol Infusion 30 MICROgram(s)/kG/Min IV Continuous <Continuous>  Adequacy of sedation and pain control has been assessed and adjusted.    RESPIRATORY  RR: 17 (17 - 34)  SpO2: 92% (92% - 97%)  Exam: coarse rhonchi in all lung fields  Mechanical Ventilation: Mode: AC/ CMV (Assist Control/ Continuous Mandatory Ventilation), RR (machine): 34, TV (machine): 400, FiO2: 50, PEEP: 16  ABG - ( 05 Apr 2020 01:12 )  pH: 7.24  /  pCO2: 53    /  pO2: 64    / HCO3: 22    / Base Excess: -4.8  /  SaO2: 89    /    Lactate: 3.2  PaO2 to FiO2 ratio: 128  Peak Pressure: 37  Plateau Pressure: 36  Patient not a candidate for a SBT at this time due to ventilator requirements    CARDIOVASCULAR  HR: 73 (64 - 110)  ABP: 128/43 (116/53 - 134/51)  ABP(mean): 70 (70 - 80)  Exam: regualr rate and rhythm, S1S2  Cardiac Rhythm: sinus  Meds:  - DOPamine Infusion 2.5 MICROgram(s)/kG/Min IV Continuous <Continuous>  - norepinephrine Infusion 0.04 MICROgram(s)/kG/Min IV Continuous <Continuous>  - vasopressin Infusion 0.04 Unit(s)/Min IV Continuous <Continuous>    GI/NUTRITION  Diet: Diet, NPO with Tube Feed:   Tube Feeding Modality: Orogastric  Glucerna 1.2 Aj (GLUCERNARTH)  Total Volume for 24 Hours (mL): 480  Continuous Goal Tube Feed Rate (mL per Hour): 20  Tube Feed Duration (in Hours): 24    Most recent bowel movement: rectal tube with continuous output  Meds: famotidine Injectable 20 milliGRAM(s) IV Push daily    GENITOURINARY  I&O's Detail  04-04 @ 07:01  -  04-05 @ 04:10  --------------------------------------------------------  IN:    DOPamine Infusion: 59.6 mL    DOPamine Infusion: 74 mL    Glucerna 1.5: 280 mL    HYDROmorphone  Infusion: 8 mL    ketamine Infusion: 12 mL    ketamine Infusion: 1 mL    ketamine Infusion: 4 mL    norepinephrine Infusion: 27.5 mL    norepinephrine Infusion: 42.9 mL    propofol Infusion: 66.8 mL    propofol Infusion: 167 mL    vasopressin Infusion: 28.8 mL    vasopressin Infusion: 4.8 mL  Total IN: 776.4 mL    OUT:    Indwelling Catheter - Urethral: 5 mL    Other: 1202 mL  Total OUT: 1207 mL    Total NET: -430.6 mL    135  |  99  |  14  ----------------------------<  152<H>  4.2   |  18<L>  |  0.81    Ca    9.2      05 Apr 2020 01:32  Phos  3.2  Mg     2.5  TPro  6.3  /  Alb  1.9<L>  /  TBili  0.5  /  DBili  0.3<H>  /  AST  159<H>  /  ALT  66<H>  /  AlkPhos  254<H>    [x] Alamo catheter, indication: urine output monitoring in critically ill  Meds:  - CRRT Treatment    <Continuous>  - heparin  Infusion Syringe 500 Unit(s)/Hr CRRT <Continuous>    HEMATOLOGIC  Meds: heparin  Injectable 5000 Unit(s) SubCutaneous every 8 hours                        10.8   7.02  )-----------( 109      ( 05 Apr 2020 01:32 )             32.1     INFECTIOUS DISEASES  T(C): 33, Max: 33.3 (04-04-20 @ 16:00)  WBC Count: 7.02 (04-05-20 @ 01:32)  Procalcitonin, Serum: 15.57 ng/mL (04-05-20 @ 01:32)    Recent Cultures:  - Specimen Source: .Urine Catheterized, 03-31 @ 00:36  Results: No growth  Gram Stain: --  Organism: --  - Specimen Source: .Blood Blood-Peripheral, 03-29 @ 18:35  Results: No Growth Final  Gram Stain: --  Organism: --  Meds: acetaminophen    Suspension .. 650 milliGRAM(s) Enteral Tube every 6 hours PRN Temp greater or equal to 38C (100.4F)    ENDOCRINE  Fingersticks:  - 120 mg/dL (04 Apr 2020 12:29)  - 112 mg/dL (04 Apr 2020 05:26)  Meds:  - atorvastatin 20 milliGRAM(s) Oral at bedtime  - insulin lispro (HumaLOG) corrective regimen sliding scale   SubCutaneous every 6 hours  - levothyroxine Injectable 50 MICROGram(s) IV Push at bedtime    ACCESS DEVICES:  [x] Peripheral IV  [x] Central Venous Line		[x] R	[ ] L	[x] IJ	[ ] Fem	 [ ] SC		Placed: 3/28  [x] Arterial Line			[x] R	[ ] L	[ ] Fem	[x] Rad	 [ ] Ax		Placed: 3/28  [ ] Shiley HD Access		[ ] R 	[x] L 	[x] IJ  	[ ] Fem			Placed: 4/2  [x] Urinary Catheter, Date Placed: 3/28  Necessity of urinary, arterial, and venous catheters discussed.    CODE STATUS: DNR    IMAGING: HISTORY  74 y/o female w/ a PMHx of HTN, HLD, DM type II, and hypothyroidism who presented to the ED on 3/27 after ~5 days of weakness, fatigue, and poor PO intake and ~2 days of fevers and dyspnea. EMS found that patient was hypoxemic with SpO2 of 86% on room air. Patient found to have COVID-19 pneumonia. She was admitted to the floors but eventually went into acute hypoxic respiratory failure requiring intubation on 3/28 and was transferred to [facility] ICU in PICU for further management. She completed a 5 day course of hydroxychlorquine from 3/27-4/1. Hospital course complicated by YELITZA requiring CRRT. Patient remains intubated and sedated so unable to obtain ROS.    24 HOUR EVENTS:  - Remains on CRRT  - Discontinued Dilaudid infusion for sedation  - Attempted to wean PEEP from 18 to 16 but patient desaturated to high 80s  - Hypothermic  - Lactate trending upwards 2.6 -> 2.8 -> 3.2    SUBJECTIVE/ROS: Due to intubation with sedation, subjective information was not able to be obtained from the patient. History was obtained, to the extent possible, from review of the chart and collateral sources of information.    NEURO  RASS: -5  Exam: sedated, does not respond to noxious stimuli  Meds:  - ketamine Infusion 0.5 mG/kG/Hr IV Continuous <Continuous>  - propofol Infusion 10 MICROgram(s)/kG/Min IV Continuous <Continuous>  Adequacy of sedation and pain control has been assessed and adjusted.    RESPIRATORY  RR: 17 (17 - 34)  SpO2: 92% (92% - 97%)  Exam: coarse rhonchi in all lung fields  Mechanical Ventilation: Mode: AC/ CMV (Assist Control/ Continuous Mandatory Ventilation), RR (machine): 34, TV (machine): 400, FiO2: 50, PEEP: 18  ABG - ( 05 Apr 2020 01:12 )  pH: 7.24  /  pCO2: 53    /  pO2: 64    / HCO3: 22    / Base Excess: -4.8  /  SaO2: 89    /    Lactate: 3.2  PaO2 to FiO2 ratio: 128  Peak Pressure: 37  Plateau Pressure: 36  Patient not a candidate for a SBT at this time due to ventilator requirements    CARDIOVASCULAR  HR: 73 (64 - 110)  ABP: 128/43 (116/53 - 134/51)  ABP(mean): 70 (70 - 80)  Exam: regualr rate and rhythm, S1S2  Cardiac Rhythm: sinus  Meds:  - DOPamine Infusion 2.5 MICROgram(s)/kG/Min IV Continuous <Continuous>  - norepinephrine Infusion on and off  - off vasopressin    GI/NUTRITION  Diet: Diet, NPO with Tube Feed:   Tube Feeding Modality: Orogastric  Glucerna 1.2 Aj (GLUCERNARTH)  Total Volume for 24 Hours (mL): 480  Continuous Goal Tube Feed Rate (mL per Hour): 20  Tube Feed Duration (in Hours): 24    Most recent bowel movement: rectal tube with continuous output  Meds: famotidine Injectable 20 milliGRAM(s) IV Push daily    GENITOURINARY  I&O's Detail  04-04 @ 07:01  -  04-05 @ 04:10  --------------------------------------------------------  IN:    DOPamine Infusion: 59.6 mL    DOPamine Infusion: 74 mL    Glucerna 1.5: 280 mL    HYDROmorphone  Infusion: 8 mL    ketamine Infusion: 12 mL    ketamine Infusion: 1 mL    ketamine Infusion: 4 mL    norepinephrine Infusion: 27.5 mL    norepinephrine Infusion: 42.9 mL    propofol Infusion: 66.8 mL    propofol Infusion: 167 mL    vasopressin Infusion: 28.8 mL    vasopressin Infusion: 4.8 mL  Total IN: 776.4 mL    OUT:    Indwelling Catheter - Urethral: 5 mL    Other: 1202 mL  Total OUT: 1207 mL    Total NET: -430.6 mL    135  |  99  |  14  ----------------------------<  152<H>  4.2   |  18<L>  |  0.81    Ca    9.2      05 Apr 2020 01:32  Phos  3.2  Mg     2.5  TPro  6.3  /  Alb  1.9<L>  /  TBili  0.5  /  DBili  0.3<H>  /  AST  159<H>  /  ALT  66<H>  /  AlkPhos  254<H>    [x] [place] catheter, indication: urine output monitoring in critically ill  Meds:  - CRRT Treatment    <Continuous>  - heparin  Infusion Syringe 500 Unit(s)/Hr CRRT <Continuous>    HEMATOLOGIC  Meds: heparin  Injectable 5000 Unit(s) SubCutaneous every 8 hours                        10.8   7.02  )-----------( 109      ( 05 Apr 2020 01:32 )             32.1     INFECTIOUS DISEASES  T(C): 33, Max: 33.3 (04-04-20 @ 16:00)  WBC Count: 7.02 (04-05-20 @ 01:32)  Procalcitonin, Serum: 15.57 ng/mL (04-05-20 @ 01:32)    Recent Cultures:  - Specimen Source: .Urine Catheterized, 03-31 @ 00:36  Results: No growth  Gram Stain: --  Organism: --  - Specimen Source: .Blood Blood-Peripheral, 03-29 @ 18:35  Results: No Growth Final  Gram Stain: --  Organism: --  Meds: acetaminophen    Suspension .. 650 milliGRAM(s) Enteral Tube every 6 hours PRN Temp greater or equal to 38C (100.4F)    ENDOCRINE  Fingersticks:  - 120 mg/dL (04 Apr 2020 12:29)  - 112 mg/dL (04 Apr 2020 05:26)  Meds:  - atorvastatin 20 milliGRAM(s) Oral at bedtime  - insulin lispro (HumaLOG) corrective regimen sliding scale   SubCutaneous every 6 hours  - levothyroxine Injectable 50 MICROGram(s) IV Push at bedtime    ACCESS DEVICES:  [x] Peripheral IV  [x] Central Venous Line		[x] R	[ ] L	[x] IJ	[ ] Fem	 [ ] SC		Placed: 3/28  [x] Arterial Line			[x] R	[ ] L	[ ] Fem	[x] Rad	 [ ] Ax		Placed: 3/28  [ ] [facility] HD Access		[ ] R 	[x] L 	[x] IJ  	[ ] Fem			Placed: 4/2  [x] Urinary Catheter, Date Placed: 3/28  Necessity of urinary, arterial, and venous catheters discussed.    CODE STATUS: DNR

## 2020-04-05 NOTE — PROGRESS NOTE ADULT - SUBJECTIVE AND OBJECTIVE BOX
[facility] DIVISION OF KIDNEY DISEASES AND HYPERTENSION -- FOLLOW UP NOTE  --------------------------------------------------------------------------------  Chief Complaint:  COVID-19 infection    24 hour events/subjective: Pt. seen this AM. Pt. remains sedated, intubated (FiO2 stable at 50, PEEP stable at 16), and on IV vasopressor support. Pt. tolerating CRRT well today. Unable to complete ROS.    PAST HISTORY  --------------------------------------------------------------------------------  No significant changes to PMH, PSH, FHx, SHx, unless otherwise noted    ALLERGIES & MEDICATIONS  --------------------------------------------------------------------------------  Allergies    lidocaine (Other)    Intolerances      Standing Inpatient Medications  atorvastatin 20 milliGRAM(s) Oral at bedtime  chlorhexidine 0.12% Liquid 15 milliLiter(s) Oral Mucosa every 12 hours  CRRT Treatment    <Continuous>  DOPamine Infusion 2.5 MICROgram(s)/kG/Min IV Continuous <Continuous>  famotidine Injectable 20 milliGRAM(s) IV Push daily  heparin  Infusion Syringe 500 Unit(s)/Hr CRRT <Continuous>  heparin  Injectable 5000 Unit(s) SubCutaneous every 8 hours  influenza   Vaccine 0.5 milliLiter(s) IntraMuscular once  insulin lispro (HumaLOG) corrective regimen sliding scale   SubCutaneous every 6 hours  ketamine Infusion 0.5 mG/kG/Hr IV Continuous <Continuous>  levothyroxine Injectable 50 MICROGram(s) IV Push at bedtime  norepinephrine Infusion 0.08 MICROgram(s)/kG/Min IV Continuous <Continuous>  petrolatum Ophthalmic Ointment 1 Application(s) Both EYES two times a day  Phoxillum Filtration BK 4 / 2.5 5000 milliLiter(s) CRRT <Continuous>  Phoxillum Filtration BK 4 / 2.5 5000 milliLiter(s) CRRT <Continuous>  PrismaSOL Filtration BGK 4 / 2.5 5000 milliLiter(s) CRRT <Continuous>  propofol Infusion 10 MICROgram(s)/kG/Min IV Continuous <Continuous>  sodium bicarbonate  Infusion 0.095 mEq/kG/Hr IV Continuous <Continuous>  sodium bicarbonate  Injectable 50 milliEquivalent(s) IV Push once  vasopressin Infusion 0.04 Unit(s)/Min IV Continuous <Continuous>    PRN Inpatient Medications  acetaminophen    Suspension .. 650 milliGRAM(s) Enteral Tube every 6 hours PRN      REVIEW OF SYSTEMS  --------------------------------------------------------------------------------  Unable to obtain    VITALS/PHYSICAL EXAM  --------------------------------------------------------------------------------  T(C): 35 (04-05-20 @ 12:00), Max: 35 (04-05-20 @ 12:00)  HR: 80 (04-05-20 @ 12:00) (64 - 80)  BP: --  RR: 22 (04-05-20 @ 12:00) (17 - 30)  SpO2: 93% (04-05-20 @ 12:00) (91% - 97%)  Wt(kg): --        04-04-20 @ 07:01  -  04-05-20 @ 07:00  --------------------------------------------------------  IN: 816.5 mL / OUT: 1617 mL / NET: -800.5 mL    04-05-20 @ 07:01  -  04-05-20 @ 14:36  --------------------------------------------------------  IN: 77.2 mL / OUT: 130 mL / NET: -52.8 mL    PE: Deferred due to COVID-19 infection and preservation of PPE    LABS/STUDIES  --------------------------------------------------------------------------------              10.8   7.02  >-----------<  109      [04-05-20 @ 01:32]              32.1     136  |  99  |  14  ----------------------------<  125      [04-05-20 @ 05:50]  3.8   |  20  |  0.79        Ca     8.9     [04-05-20 @ 05:50]      Mg     2.5     [04-05-20 @ 05:50]      Phos  3.3     [04-05-20 @ 05:50]    TPro  6.3  /  Alb  1.9  /  TBili  0.5  /  DBili  0.3  /  AST  159  /  ALT  66  /  AlkPhos  254  [04-05-20 @ 01:32]    Creatinine Trend:  SCr 0.79 [04-05 @ 05:50]  SCr 0.81 [04-05 @ 01:32]  SCr 0.89 [04-04 @ 16:57]  SCr 1.27 [04-04 @ 02:38]  SCr 1.60 [04-03 @ 17:48]

## 2020-05-28 PROCEDURE — 94003 VENT MGMT INPAT SUBQ DAY: CPT

## 2020-05-28 PROCEDURE — 82803 BLOOD GASES ANY COMBINATION: CPT

## 2020-05-28 PROCEDURE — 84443 ASSAY THYROID STIM HORMONE: CPT

## 2020-05-28 PROCEDURE — 82955 ASSAY OF G6PD ENZYME: CPT

## 2020-05-28 PROCEDURE — 84100 ASSAY OF PHOSPHORUS: CPT

## 2020-05-28 PROCEDURE — 82435 ASSAY OF BLOOD CHLORIDE: CPT

## 2020-05-28 PROCEDURE — 80053 COMPREHEN METABOLIC PANEL: CPT

## 2020-05-28 PROCEDURE — 82728 ASSAY OF FERRITIN: CPT

## 2020-05-28 PROCEDURE — 83880 ASSAY OF NATRIURETIC PEPTIDE: CPT

## 2020-05-28 PROCEDURE — 85730 THROMBOPLASTIN TIME PARTIAL: CPT

## 2020-05-28 PROCEDURE — 99285 EMERGENCY DEPT VISIT HI MDM: CPT

## 2020-05-28 PROCEDURE — 84132 ASSAY OF SERUM POTASSIUM: CPT

## 2020-05-28 PROCEDURE — 84295 ASSAY OF SERUM SODIUM: CPT

## 2020-05-28 PROCEDURE — 82330 ASSAY OF CALCIUM: CPT

## 2020-05-28 PROCEDURE — 83605 ASSAY OF LACTIC ACID: CPT

## 2020-05-28 PROCEDURE — 87086 URINE CULTURE/COLONY COUNT: CPT

## 2020-05-28 PROCEDURE — 87040 BLOOD CULTURE FOR BACTERIA: CPT

## 2020-05-28 PROCEDURE — 80076 HEPATIC FUNCTION PANEL: CPT

## 2020-05-28 PROCEDURE — 83615 LACTATE (LD) (LDH) ENZYME: CPT

## 2020-05-28 PROCEDURE — 94002 VENT MGMT INPAT INIT DAY: CPT

## 2020-05-28 PROCEDURE — 83529 ASAY OF INTERLEUKIN-6 (IL-6): CPT

## 2020-05-28 PROCEDURE — 83036 HEMOGLOBIN GLYCOSYLATED A1C: CPT

## 2020-05-28 PROCEDURE — 84145 PROCALCITONIN (PCT): CPT

## 2020-05-28 PROCEDURE — 85014 HEMATOCRIT: CPT

## 2020-05-28 PROCEDURE — 86140 C-REACTIVE PROTEIN: CPT

## 2020-05-28 PROCEDURE — 85652 RBC SED RATE AUTOMATED: CPT

## 2020-05-28 PROCEDURE — 85610 PROTHROMBIN TIME: CPT

## 2020-05-28 PROCEDURE — 71045 X-RAY EXAM CHEST 1 VIEW: CPT

## 2020-05-28 PROCEDURE — 82947 ASSAY GLUCOSE BLOOD QUANT: CPT

## 2020-05-28 PROCEDURE — 94640 AIRWAY INHALATION TREATMENT: CPT

## 2020-05-28 PROCEDURE — 93005 ELECTROCARDIOGRAM TRACING: CPT

## 2020-05-28 PROCEDURE — 82553 CREATINE MB FRACTION: CPT

## 2020-05-28 PROCEDURE — 82550 ASSAY OF CK (CPK): CPT

## 2020-05-28 PROCEDURE — 86360 T CELL ABSOLUTE COUNT/RATIO: CPT

## 2020-05-28 PROCEDURE — 82962 GLUCOSE BLOOD TEST: CPT

## 2020-05-28 PROCEDURE — 80048 BASIC METABOLIC PNL TOTAL CA: CPT

## 2020-05-28 PROCEDURE — 84478 ASSAY OF TRIGLYCERIDES: CPT

## 2020-05-28 PROCEDURE — 83735 ASSAY OF MAGNESIUM: CPT

## 2020-05-28 PROCEDURE — 84484 ASSAY OF TROPONIN QUANT: CPT

## 2020-05-28 PROCEDURE — 87635 SARS-COV-2 COVID-19 AMP PRB: CPT

## 2020-05-28 PROCEDURE — 85027 COMPLETE CBC AUTOMATED: CPT

## 2020-05-28 PROCEDURE — 85384 FIBRINOGEN ACTIVITY: CPT

## 2020-05-28 PROCEDURE — 82565 ASSAY OF CREATININE: CPT

## 2020-05-28 PROCEDURE — 85379 FIBRIN DEGRADATION QUANT: CPT

## 2020-05-28 PROCEDURE — 81001 URINALYSIS AUTO W/SCOPE: CPT

## 2020-06-27 NOTE — CHART NOTE - NSCHARTNOTEFT_GEN_A_CORE
I, Kusum Jha MD attest that, to the best of my knowledge based on clinical presentation and findings documented in the medical record, this patient had sepsis due to COVID-19 pneumonia present on admission: Temp 102, RR 26, hypoxia, procalcitonin 0.29, lactate 2.1.

## 2020-07-14 ENCOUNTER — APPOINTMENT (OUTPATIENT)
Dept: CARDIOLOGY | Facility: CLINIC | Age: 75
End: 2020-07-14

## 2020-10-01 NOTE — ED PROVIDER NOTE - CARE PLAN
Problem: Pain:  Goal: Pain level will decrease  Description: Pain level will decrease  Outcome: Ongoing  Note: Pain /discomfort being managed with PRN analgesics per MD orders. Patient able to express presence and absence of pain and rate pain appropriately using numerical scale. Goal: Control of acute pain  Description: Control of acute pain  Outcome: Ongoing  Goal: Control of chronic pain  Description: Control of chronic pain  Outcome: Ongoing     Problem: Falls - Risk of:  Goal: Will remain free from falls  Description: Will remain free from falls  Outcome: Ongoing  Note: Fall risk assessment completed . Fall precautions in place, bed/ chair alarm on, side rails 2/4 up, call light in reach, educated pt on calling for assistance when needed, room clear of clutter. Pt verbalized understanding. Goal: Absence of physical injury  Description: Absence of physical injury  Outcome: Ongoing     Problem: Skin Integrity:  Goal: Will show no infection signs and symptoms  Description: Will show no infection signs and symptoms  Outcome: Ongoing  Note: Pt assessed for infection, No signs or symptoms of surgical site noted. VVS, WBC WNL.  Reviewed information with pt and family, pt verbalized understanding     Goal: Absence of new skin breakdown  Description: Absence of new skin breakdown  Outcome: Ongoing Principal Discharge DX:	Hypoxia